# Patient Record
Sex: FEMALE | Race: WHITE | NOT HISPANIC OR LATINO | Employment: OTHER | URBAN - METROPOLITAN AREA
[De-identification: names, ages, dates, MRNs, and addresses within clinical notes are randomized per-mention and may not be internally consistent; named-entity substitution may affect disease eponyms.]

---

## 2017-02-03 ENCOUNTER — OFFICE VISIT - HEALTHEAST (OUTPATIENT)
Dept: INTERNAL MEDICINE | Facility: CLINIC | Age: 67
End: 2017-02-03

## 2017-02-03 DIAGNOSIS — I10 ESSENTIAL HYPERTENSION WITH GOAL BLOOD PRESSURE LESS THAN 140/90: ICD-10-CM

## 2017-02-03 DIAGNOSIS — G54.5 PARSONAGE-TURNER SYNDROME: ICD-10-CM

## 2017-02-03 ASSESSMENT — MIFFLIN-ST. JEOR: SCORE: 1364.92

## 2017-07-05 ENCOUNTER — COMMUNICATION - HEALTHEAST (OUTPATIENT)
Dept: SCHEDULING | Facility: CLINIC | Age: 67
End: 2017-07-05

## 2017-07-05 DIAGNOSIS — I10 UNSPECIFIED ESSENTIAL HYPERTENSION: ICD-10-CM

## 2018-05-23 ENCOUNTER — COMMUNICATION - HEALTHEAST (OUTPATIENT)
Dept: SCHEDULING | Facility: CLINIC | Age: 68
End: 2018-05-23

## 2018-05-23 DIAGNOSIS — I10 ESSENTIAL HYPERTENSION: ICD-10-CM

## 2018-08-14 ENCOUNTER — RECORDS - HEALTHEAST (OUTPATIENT)
Dept: ADMINISTRATIVE | Facility: OTHER | Age: 68
End: 2018-08-14

## 2018-08-16 ENCOUNTER — COMMUNICATION - HEALTHEAST (OUTPATIENT)
Dept: SCHEDULING | Facility: CLINIC | Age: 68
End: 2018-08-16

## 2018-08-16 DIAGNOSIS — I10 ESSENTIAL HYPERTENSION: ICD-10-CM

## 2018-11-05 ENCOUNTER — COMMUNICATION - HEALTHEAST (OUTPATIENT)
Dept: INTERNAL MEDICINE | Facility: CLINIC | Age: 68
End: 2018-11-05

## 2019-03-20 ENCOUNTER — COMMUNICATION - HEALTHEAST (OUTPATIENT)
Dept: INTERNAL MEDICINE | Facility: CLINIC | Age: 69
End: 2019-03-20

## 2019-03-20 DIAGNOSIS — I10 ESSENTIAL HYPERTENSION: ICD-10-CM

## 2019-05-31 ENCOUNTER — OFFICE VISIT - HEALTHEAST (OUTPATIENT)
Dept: INTERNAL MEDICINE | Facility: CLINIC | Age: 69
End: 2019-05-31

## 2019-05-31 DIAGNOSIS — Z12.31 VISIT FOR SCREENING MAMMOGRAM: ICD-10-CM

## 2019-05-31 DIAGNOSIS — I10 ESSENTIAL HYPERTENSION: ICD-10-CM

## 2019-05-31 DIAGNOSIS — G43.709 CHRONIC MIGRAINE WITHOUT AURA WITHOUT STATUS MIGRAINOSUS, NOT INTRACTABLE: ICD-10-CM

## 2019-05-31 DIAGNOSIS — I35.9 AORTIC VALVE DISORDER: ICD-10-CM

## 2019-05-31 LAB
ALBUMIN SERPL-MCNC: 3.8 G/DL (ref 3.5–5)
ALP SERPL-CCNC: 60 U/L (ref 45–120)
ALT SERPL W P-5'-P-CCNC: 20 U/L (ref 0–45)
ANION GAP SERPL CALCULATED.3IONS-SCNC: 11 MMOL/L (ref 5–18)
AST SERPL W P-5'-P-CCNC: 18 U/L (ref 0–40)
BILIRUB SERPL-MCNC: 0.7 MG/DL (ref 0–1)
BUN SERPL-MCNC: 15 MG/DL (ref 8–22)
CALCIUM SERPL-MCNC: 9.6 MG/DL (ref 8.5–10.5)
CHLORIDE BLD-SCNC: 107 MMOL/L (ref 98–107)
CHOLEST SERPL-MCNC: 209 MG/DL
CO2 SERPL-SCNC: 25 MMOL/L (ref 22–31)
CREAT SERPL-MCNC: 0.79 MG/DL (ref 0.6–1.1)
ERYTHROCYTE [DISTWIDTH] IN BLOOD BY AUTOMATED COUNT: 13 % (ref 11–14.5)
FASTING STATUS PATIENT QL REPORTED: YES
GFR SERPL CREATININE-BSD FRML MDRD: >60 ML/MIN/1.73M2
GLUCOSE BLD-MCNC: 93 MG/DL (ref 70–125)
HCT VFR BLD AUTO: 43.3 % (ref 35–47)
HDLC SERPL-MCNC: 66 MG/DL
HGB BLD-MCNC: 14.6 G/DL (ref 12–16)
LDLC SERPL CALC-MCNC: 127 MG/DL
MCH RBC QN AUTO: 28.1 PG (ref 27–34)
MCHC RBC AUTO-ENTMCNC: 33.6 G/DL (ref 32–36)
MCV RBC AUTO: 84 FL (ref 80–100)
PLATELET # BLD AUTO: 160 THOU/UL (ref 140–440)
PMV BLD AUTO: 9.1 FL (ref 7–10)
POTASSIUM BLD-SCNC: 3.5 MMOL/L (ref 3.5–5)
PROT SERPL-MCNC: 6.8 G/DL (ref 6–8)
RBC # BLD AUTO: 5.18 MILL/UL (ref 3.8–5.4)
SODIUM SERPL-SCNC: 143 MMOL/L (ref 136–145)
TRIGL SERPL-MCNC: 82 MG/DL
WBC: 6.7 THOU/UL (ref 4–11)

## 2019-05-31 ASSESSMENT — MIFFLIN-ST. JEOR: SCORE: 1387.6

## 2019-06-01 ENCOUNTER — COMMUNICATION - HEALTHEAST (OUTPATIENT)
Dept: INTERNAL MEDICINE | Facility: CLINIC | Age: 69
End: 2019-06-01

## 2019-06-01 DIAGNOSIS — I10 ESSENTIAL HYPERTENSION: ICD-10-CM

## 2019-06-04 ENCOUNTER — COMMUNICATION - HEALTHEAST (OUTPATIENT)
Dept: INTERNAL MEDICINE | Facility: CLINIC | Age: 69
End: 2019-06-04

## 2019-06-19 ENCOUNTER — COMMUNICATION - HEALTHEAST (OUTPATIENT)
Dept: INTERNAL MEDICINE | Facility: CLINIC | Age: 69
End: 2019-06-19

## 2019-06-19 ENCOUNTER — HOSPITAL ENCOUNTER (OUTPATIENT)
Dept: CARDIOLOGY | Facility: CLINIC | Age: 69
Discharge: HOME | End: 2019-06-19
Attending: INTERNAL MEDICINE

## 2019-06-19 DIAGNOSIS — I35.9 AORTIC VALVE DISORDER: ICD-10-CM

## 2019-06-19 LAB
AORTIC ROOT: 3.3 CM
AORTIC VALVE MEAN VELOCITY: 203 CM/S
AV DIMENSIONLESS INDEX VTI: 0.3
AV MEAN GRADIENT: 20 MMHG
AV PEAK GRADIENT: 40.7 MMHG
AV VALVE AREA: 1.4 CM2
AV VELOCITY RATIO: 0.4
BSA FOR ECHO PROCEDURE: 1.98 M2
CV ECHO HEIGHT: 63.3 IN
CV ECHO WEIGHT: 195 LBS
DOP CALC AO PEAK VEL: 319 CM/S
DOP CALC AO VTI: 63.2 CM
DOP CALC LVOT AREA: 4.15 CM2
DOP CALC LVOT DIAMETER: 2.3 CM
DOP CALC LVOT PEAK VEL: 117 CM/S
DOP CALC LVOT STROKE VOLUME: 86.8 CM3
DOP CALC MV VTI: 27.3 CM
DOP CALCLVOT PEAK VEL VTI: 20.9 CM
EJECTION FRACTION: 58 % (ref 55–75)
FRACTIONAL SHORTENING: 30.7 % (ref 28–44)
INTERVENTRICULAR SEPTUM IN END DIASTOLE: 1.7 CM (ref 0.6–0.9)
IVS/PW RATIO: 1.4
LA AREA 1: 14.6 CM2
LA AREA 2: 19.1 CM2
LEFT ATRIUM LENGTH: 3.81 CM
LEFT ATRIUM SIZE: 3.3 CM
LEFT ATRIUM TO AORTIC ROOT RATIO: 1 NO UNITS
LEFT ATRIUM VOLUME INDEX: 31.4 ML/M2
LEFT ATRIUM VOLUME: 62.2 ML
LEFT VENTRICLE CARDIAC INDEX: 2.9 L/MIN/M2
LEFT VENTRICLE CARDIAC OUTPUT: 5.7 L/MIN
LEFT VENTRICLE DIASTOLIC VOLUME INDEX: 42.4 CM3/M2 (ref 29–61)
LEFT VENTRICLE DIASTOLIC VOLUME: 84 CM3 (ref 46–106)
LEFT VENTRICLE HEART RATE: 66 BPM
LEFT VENTRICLE MASS INDEX: 129.9 G/M2
LEFT VENTRICLE SYSTOLIC VOLUME INDEX: 17.7 CM3/M2 (ref 8–24)
LEFT VENTRICLE SYSTOLIC VOLUME: 35 CM3 (ref 14–42)
LEFT VENTRICULAR INTERNAL DIMENSION IN DIASTOLE: 4.43 CM (ref 3.8–5.2)
LEFT VENTRICULAR INTERNAL DIMENSION IN SYSTOLE: 3.07 CM (ref 2.2–3.5)
LEFT VENTRICULAR MASS: 257.3 G
LEFT VENTRICULAR OUTFLOW TRACT MEAN GRADIENT: 3 MMHG
LEFT VENTRICULAR OUTFLOW TRACT MEAN VELOCITY: 75.1 CM/S
LEFT VENTRICULAR OUTFLOW TRACT PEAK GRADIENT: 5 MMHG
LEFT VENTRICULAR POSTERIOR WALL IN END DIASTOLE: 1.21 CM (ref 0.6–0.9)
LV STROKE VOLUME INDEX: 43.8 ML/M2
MITRAL VALVE DECELERATION SLOPE: 4520 MM/S2
MITRAL VALVE E/A RATIO: 0.6
MITRAL VALVE MEAN INFLOW VELOCITY: 77 CM/S
MITRAL VALVE PEAK VELOCITY: 132 CM/S
MITRAL VALVE PRESSURE HALF-TIME: 60 MS
MV AREA VTI: 3.18 CM2
MV AVERAGE E/E' RATIO: 9.1 CM/S
MV DECELERATION TIME: 211 MS
MV E'TISSUE VEL-LAT: 7.41 CM/S
MV E'TISSUE VEL-MED: 8.87 CM/S
MV LATERAL E/E' RATIO: 10
MV MEAN GRADIENT: 3 MMHG
MV MEDIAL E/E' RATIO: 8.3
MV PEAK A VELOCITY: 114 CM/S
MV PEAK E VELOCITY: 74 CM/S
MV PEAK GRADIENT: 7 MMHG
MV VALVE AREA BY CONTINUITY EQUATION: 3.2 CM2
MV VALVE AREA PRESSURE 1/2 METHOD: 3.7 CM2
NUC REST DIASTOLIC VOLUME INDEX: 3120 LBS
NUC REST SYSTOLIC VOLUME INDEX: 63.25 IN
TRICUSPID REGURGITATION PEAK PRESSURE GRADIENT: 14.6 MMHG
TRICUSPID VALVE ANULAR PLANE SYSTOLIC EXCURSION: 2.6 CM
TRICUSPID VALVE PEAK REGURGITANT VELOCITY: 191 CM/S

## 2019-06-19 ASSESSMENT — MIFFLIN-ST. JEOR: SCORE: 1387.6

## 2019-06-25 ENCOUNTER — COMMUNICATION - HEALTHEAST (OUTPATIENT)
Dept: INTERNAL MEDICINE | Facility: CLINIC | Age: 69
End: 2019-06-25

## 2020-03-23 ENCOUNTER — COMMUNICATION - HEALTHEAST (OUTPATIENT)
Dept: INTERNAL MEDICINE | Facility: CLINIC | Age: 70
End: 2020-03-23

## 2020-03-23 DIAGNOSIS — I10 ESSENTIAL HYPERTENSION: ICD-10-CM

## 2020-04-02 ENCOUNTER — COMMUNICATION - HEALTHEAST (OUTPATIENT)
Dept: INTERNAL MEDICINE | Facility: CLINIC | Age: 70
End: 2020-04-02

## 2020-04-03 ENCOUNTER — COMMUNICATION - HEALTHEAST (OUTPATIENT)
Dept: INTERNAL MEDICINE | Facility: CLINIC | Age: 70
End: 2020-04-03

## 2020-04-03 DIAGNOSIS — I10 ESSENTIAL HYPERTENSION: ICD-10-CM

## 2020-05-18 ENCOUNTER — OFFICE VISIT - HEALTHEAST (OUTPATIENT)
Dept: INTERNAL MEDICINE | Facility: CLINIC | Age: 70
End: 2020-05-18

## 2020-05-18 ENCOUNTER — COMMUNICATION - HEALTHEAST (OUTPATIENT)
Dept: SCHEDULING | Facility: CLINIC | Age: 70
End: 2020-05-18

## 2020-05-18 DIAGNOSIS — R22.1 SUBMANDIBULAR SWELLING: ICD-10-CM

## 2020-05-18 DIAGNOSIS — R22.0 SUBMANDIBULAR SWELLING: ICD-10-CM

## 2020-05-27 ENCOUNTER — OFFICE VISIT - HEALTHEAST (OUTPATIENT)
Dept: INTERNAL MEDICINE | Facility: CLINIC | Age: 70
End: 2020-05-27

## 2020-05-27 DIAGNOSIS — R22.1 SUBMANDIBULAR SWELLING: ICD-10-CM

## 2020-05-27 DIAGNOSIS — R22.0 SUBMANDIBULAR SWELLING: ICD-10-CM

## 2020-05-27 LAB
BASOPHILS # BLD AUTO: 0.1 THOU/UL (ref 0–0.2)
BASOPHILS NFR BLD AUTO: 1 % (ref 0–2)
EOSINOPHIL # BLD AUTO: 0.1 THOU/UL (ref 0–0.4)
EOSINOPHIL NFR BLD AUTO: 2 % (ref 0–6)
ERYTHROCYTE [DISTWIDTH] IN BLOOD BY AUTOMATED COUNT: 13.2 % (ref 11–14.5)
HCT VFR BLD AUTO: 45.3 % (ref 35–47)
HGB BLD-MCNC: 14.4 G/DL (ref 12–16)
LYMPHOCYTES # BLD AUTO: 2.3 THOU/UL (ref 0.8–4.4)
LYMPHOCYTES NFR BLD AUTO: 32 % (ref 20–40)
MCH RBC QN AUTO: 28.1 PG (ref 27–34)
MCHC RBC AUTO-ENTMCNC: 31.8 G/DL (ref 32–36)
MCV RBC AUTO: 89 FL (ref 80–100)
MONOCYTES # BLD AUTO: 0.5 THOU/UL (ref 0–0.9)
MONOCYTES NFR BLD AUTO: 7 % (ref 2–10)
NEUTROPHILS # BLD AUTO: 4.2 THOU/UL (ref 2–7.7)
NEUTROPHILS NFR BLD AUTO: 58 % (ref 50–70)
PLATELET # BLD AUTO: 175 THOU/UL (ref 140–440)
PMV BLD AUTO: 12.4 FL (ref 8.5–12.5)
RBC # BLD AUTO: 5.12 MILL/UL (ref 3.8–5.4)
TSH SERPL DL<=0.005 MIU/L-ACNC: 2.48 UIU/ML (ref 0.3–5)
WBC: 7.3 THOU/UL (ref 4–11)

## 2020-05-27 ASSESSMENT — MIFFLIN-ST. JEOR: SCORE: 1401.21

## 2020-05-29 ENCOUNTER — COMMUNICATION - HEALTHEAST (OUTPATIENT)
Dept: INTERNAL MEDICINE | Facility: CLINIC | Age: 70
End: 2020-05-29

## 2020-05-29 ENCOUNTER — COMMUNICATION - HEALTHEAST (OUTPATIENT)
Dept: SCHEDULING | Facility: CLINIC | Age: 70
End: 2020-05-29

## 2020-05-29 DIAGNOSIS — I10 ESSENTIAL HYPERTENSION: ICD-10-CM

## 2020-06-02 ENCOUNTER — RECORDS - HEALTHEAST (OUTPATIENT)
Dept: ADMINISTRATIVE | Facility: OTHER | Age: 70
End: 2020-06-02

## 2020-06-24 ENCOUNTER — OFFICE VISIT - HEALTHEAST (OUTPATIENT)
Dept: INTERNAL MEDICINE | Facility: CLINIC | Age: 70
End: 2020-06-24

## 2020-06-24 DIAGNOSIS — R22.1 SUBMANDIBULAR SWELLING: ICD-10-CM

## 2020-06-24 DIAGNOSIS — R22.0 SUBMANDIBULAR SWELLING: ICD-10-CM

## 2020-06-24 ASSESSMENT — MIFFLIN-ST. JEOR: SCORE: 1401.21

## 2020-06-29 ENCOUNTER — COMMUNICATION - HEALTHEAST (OUTPATIENT)
Dept: INTERNAL MEDICINE | Facility: CLINIC | Age: 70
End: 2020-06-29

## 2020-06-29 DIAGNOSIS — I10 ESSENTIAL HYPERTENSION: ICD-10-CM

## 2020-07-09 ENCOUNTER — OFFICE VISIT - HEALTHEAST (OUTPATIENT)
Dept: OTOLARYNGOLOGY | Facility: CLINIC | Age: 70
End: 2020-07-09

## 2020-07-09 ENCOUNTER — AMBULATORY - HEALTHEAST (OUTPATIENT)
Dept: OTOLARYNGOLOGY | Facility: CLINIC | Age: 70
End: 2020-07-09

## 2020-07-09 DIAGNOSIS — R60.0 SWELLING OF SUBMANDIBULAR GLAND: ICD-10-CM

## 2020-07-09 DIAGNOSIS — Z11.59 ENCOUNTER FOR SCREENING FOR OTHER VIRAL DISEASES: ICD-10-CM

## 2020-07-17 ENCOUNTER — AMBULATORY - HEALTHEAST (OUTPATIENT)
Dept: FAMILY MEDICINE | Facility: CLINIC | Age: 70
End: 2020-07-17

## 2020-07-17 DIAGNOSIS — Z11.59 ENCOUNTER FOR SCREENING FOR OTHER VIRAL DISEASES: ICD-10-CM

## 2020-07-20 ENCOUNTER — HOSPITAL ENCOUNTER (OUTPATIENT)
Dept: ULTRASOUND IMAGING | Facility: CLINIC | Age: 70
Discharge: HOME | End: 2020-07-20
Attending: OTOLARYNGOLOGY | Admitting: RADIOLOGY

## 2020-07-20 DIAGNOSIS — R60.0 SWELLING OF SUBMANDIBULAR GLAND: ICD-10-CM

## 2020-07-29 ENCOUNTER — AMBULATORY - HEALTHEAST (OUTPATIENT)
Dept: LAB | Facility: CLINIC | Age: 70
End: 2020-07-29

## 2020-07-29 ENCOUNTER — OFFICE VISIT - HEALTHEAST (OUTPATIENT)
Dept: INTERNAL MEDICINE | Facility: CLINIC | Age: 70
End: 2020-07-29

## 2020-07-29 ENCOUNTER — COMMUNICATION - HEALTHEAST (OUTPATIENT)
Dept: ADMINISTRATIVE | Facility: CLINIC | Age: 70
End: 2020-07-29

## 2020-07-29 DIAGNOSIS — Z00.00 ANNUAL PHYSICAL EXAM: ICD-10-CM

## 2020-07-29 DIAGNOSIS — I35.9 AORTIC VALVE DISORDER: ICD-10-CM

## 2020-07-29 DIAGNOSIS — Z01.84 IMMUNITY STATUS TESTING: ICD-10-CM

## 2020-07-29 DIAGNOSIS — Z12.11 SCREEN FOR COLON CANCER: ICD-10-CM

## 2020-07-29 DIAGNOSIS — R22.0 SUBMANDIBULAR SWELLING: ICD-10-CM

## 2020-07-29 DIAGNOSIS — Z78.0 POSTMENOPAUSAL STATUS: ICD-10-CM

## 2020-07-29 DIAGNOSIS — R22.1 SUBMANDIBULAR SWELLING: ICD-10-CM

## 2020-07-29 DIAGNOSIS — I10 ESSENTIAL HYPERTENSION: ICD-10-CM

## 2020-07-29 LAB
ALBUMIN SERPL-MCNC: 3.8 G/DL (ref 3.5–5)
ALBUMIN UR-MCNC: NEGATIVE MG/DL
ALP SERPL-CCNC: 72 U/L (ref 45–120)
ALT SERPL W P-5'-P-CCNC: 17 U/L (ref 0–45)
ANION GAP SERPL CALCULATED.3IONS-SCNC: 11 MMOL/L (ref 5–18)
APPEARANCE UR: CLEAR
AST SERPL W P-5'-P-CCNC: 17 U/L (ref 0–40)
BACTERIA #/AREA URNS HPF: ABNORMAL /[HPF]
BILIRUB SERPL-MCNC: 0.6 MG/DL (ref 0–1)
BILIRUB UR QL STRIP: ABNORMAL
BUN SERPL-MCNC: 20 MG/DL (ref 8–22)
CALCIUM SERPL-MCNC: 9.5 MG/DL (ref 8.5–10.5)
CHLORIDE BLD-SCNC: 106 MMOL/L (ref 98–107)
CHOLEST SERPL-MCNC: 225 MG/DL
CO2 SERPL-SCNC: 25 MMOL/L (ref 22–31)
COLOR UR AUTO: YELLOW
CREAT SERPL-MCNC: 0.74 MG/DL (ref 0.6–1.1)
FASTING STATUS PATIENT QL REPORTED: NO
GFR SERPL CREATININE-BSD FRML MDRD: >60 ML/MIN/1.73M2
GLUCOSE BLD-MCNC: 96 MG/DL (ref 70–125)
GLUCOSE UR STRIP-MCNC: NEGATIVE MG/DL
HDLC SERPL-MCNC: 64 MG/DL
HGB UR QL STRIP: NEGATIVE
KETONES UR STRIP-MCNC: ABNORMAL MG/DL
LDLC SERPL CALC-MCNC: 139 MG/DL
LEUKOCYTE ESTERASE UR QL STRIP: ABNORMAL
NITRATE UR QL: NEGATIVE
PH UR STRIP: 5.5 [PH] (ref 5–8)
POTASSIUM BLD-SCNC: 4 MMOL/L (ref 3.5–5)
PROT SERPL-MCNC: 6.7 G/DL (ref 6–8)
RBC #/AREA URNS AUTO: ABNORMAL /[HPF]
SODIUM SERPL-SCNC: 142 MMOL/L (ref 136–145)
SP GR UR STRIP: 1.02 (ref 1–1.03)
SQUAMOUS #/AREA URNS AUTO: ABNORMAL /[HPF]
TRIGL SERPL-MCNC: 109 MG/DL
UROBILINOGEN UR STRIP-ACNC: ABNORMAL
WBC #/AREA URNS AUTO: ABNORMAL /[HPF]

## 2020-07-29 ASSESSMENT — MIFFLIN-ST. JEOR: SCORE: 1379.67

## 2020-07-30 LAB — BACTERIA SPEC CULT: NO GROWTH

## 2020-07-31 ENCOUNTER — COMMUNICATION - HEALTHEAST (OUTPATIENT)
Dept: INTERNAL MEDICINE | Facility: CLINIC | Age: 70
End: 2020-07-31

## 2020-07-31 LAB
CAP COMMENT: NORMAL
LAB AP CHARGES (HE HISTORICAL CONVERSION): NORMAL
LAB AP INITIAL CYTO EVAL (HE HISTORICAL CONVERSION): NORMAL
LAB MED GENERAL PATH INTERP (HE HISTORICAL CONVERSION): NORMAL
PATH REPORT.COMMENTS IMP SPEC: NORMAL
PATH REPORT.COMMENTS IMP SPEC: NORMAL
PATH REPORT.FINAL DX SPEC: NORMAL
PATH REPORT.MICROSCOPIC SPEC OTHER STN: NORMAL
PATH REPORT.RELEVANT HX SPEC: NORMAL
SPECIMEN DESCRIPTION: NORMAL
VZV IGG SER QL IA: POSITIVE

## 2020-08-04 ENCOUNTER — COMMUNICATION - HEALTHEAST (OUTPATIENT)
Dept: INTERNAL MEDICINE | Facility: CLINIC | Age: 70
End: 2020-08-04

## 2020-08-05 ENCOUNTER — COMMUNICATION - HEALTHEAST (OUTPATIENT)
Dept: OTOLARYNGOLOGY | Facility: CLINIC | Age: 70
End: 2020-08-05

## 2020-08-10 ENCOUNTER — COMMUNICATION - HEALTHEAST (OUTPATIENT)
Dept: INTERNAL MEDICINE | Facility: CLINIC | Age: 70
End: 2020-08-10

## 2020-08-20 ENCOUNTER — HOSPITAL ENCOUNTER (OUTPATIENT)
Dept: CARDIOLOGY | Facility: CLINIC | Age: 70
Discharge: HOME | End: 2020-08-20

## 2020-08-20 DIAGNOSIS — I35.9 AORTIC VALVE DISORDER: ICD-10-CM

## 2020-08-20 LAB
AORTIC ROOT: 3.8 CM
ASCENDING AORTA: 4 CM
BSA FOR ECHO PROCEDURE: 1.98 M2
CV BLOOD PRESSURE: ABNORMAL MMHG
CV ECHO HEIGHT: 62.8 IN
CV ECHO WEIGHT: 195 LBS
DOP CALC LVOT AREA: 4.15 CM2
DOP CALC LVOT DIAMETER: 2.3 CM
DOP CALC MV VTI: 29.8 CM
EJECTION FRACTION: 65 % (ref 55–75)
FRACTIONAL SHORTENING: 49.4 % (ref 28–44)
INTERVENTRICULAR SEPTUM IN END DIASTOLE: 2.1 CM (ref 0.6–0.9)
IVS/PW RATIO: 1.3
LA AREA 1: 23.6 CM2
LA AREA 2: 26.4 CM2
LEFT ATRIUM LENGTH: 5.94 CM
LEFT ATRIUM SIZE: 4 CM
LEFT ATRIUM TO AORTIC ROOT RATIO: 1.05 NO UNITS
LEFT ATRIUM VOLUME INDEX: 45 ML/M2
LEFT ATRIUM VOLUME: 89.2 ML
LEFT VENTRICLE DIASTOLIC VOLUME INDEX: 70.2 CM3/M2 (ref 29–61)
LEFT VENTRICLE DIASTOLIC VOLUME: 139 CM3 (ref 46–106)
LEFT VENTRICLE HEART RATE: ABNORMAL BPM
LEFT VENTRICLE MASS INDEX: 172.8 G/M2
LEFT VENTRICLE SYSTOLIC VOLUME INDEX: 24.2 CM3/M2 (ref 8–24)
LEFT VENTRICLE SYSTOLIC VOLUME: 48 CM3 (ref 14–42)
LEFT VENTRICULAR INTERNAL DIMENSION IN DIASTOLE: 4.15 CM (ref 3.8–5.2)
LEFT VENTRICULAR INTERNAL DIMENSION IN SYSTOLE: 2.1 CM (ref 2.2–3.5)
LEFT VENTRICULAR MASS: 342.1 G
LEFT VENTRICULAR POSTERIOR WALL IN END DIASTOLE: 1.59 CM (ref 0.6–0.9)
MITRAL VALVE DECELERATION SLOPE: 5460 MM/S2
MITRAL VALVE E/A RATIO: 0.7
MITRAL VALVE MEAN INFLOW VELOCITY: 94.3 CM/S
MITRAL VALVE PEAK VELOCITY: 176 CM/S
MITRAL VALVE PRESSURE HALF-TIME: 63 MS
MV AVERAGE E/E' RATIO: 12.4 CM/S
MV DECELERATION TIME: 268 MS
MV E'TISSUE VEL-LAT: 10.1 CM/S
MV E'TISSUE VEL-MED: 6.24 CM/S
MV LATERAL E/E' RATIO: 10
MV MEAN GRADIENT: 4 MMHG
MV MEDIAL E/E' RATIO: 16.2
MV PEAK A VELOCITY: 146 CM/S
MV PEAK E VELOCITY: 101 CM/S
MV PEAK GRADIENT: 12.4 MMHG
MV VALVE AREA PRESSURE 1/2 METHOD: 3.5 CM2
NUC REST DIASTOLIC VOLUME INDEX: 3120 LBS
NUC REST SYSTOLIC VOLUME INDEX: 62.75 IN
TRICUSPID VALVE ANULAR PLANE SYSTOLIC EXCURSION: 2.2 CM

## 2020-08-20 ASSESSMENT — MIFFLIN-ST. JEOR: SCORE: 1379.67

## 2020-08-25 ENCOUNTER — AMBULATORY - HEALTHEAST (OUTPATIENT)
Dept: INTERNAL MEDICINE | Facility: CLINIC | Age: 70
End: 2020-08-25

## 2020-08-25 DIAGNOSIS — I10 ESSENTIAL HYPERTENSION: ICD-10-CM

## 2020-08-27 ENCOUNTER — COMMUNICATION - HEALTHEAST (OUTPATIENT)
Dept: INTERNAL MEDICINE | Facility: CLINIC | Age: 70
End: 2020-08-27

## 2020-09-09 ENCOUNTER — COMMUNICATION - HEALTHEAST (OUTPATIENT)
Dept: INTERNAL MEDICINE | Facility: CLINIC | Age: 70
End: 2020-09-09

## 2020-09-10 ENCOUNTER — COMMUNICATION - HEALTHEAST (OUTPATIENT)
Dept: CARDIOLOGY | Facility: CLINIC | Age: 70
End: 2020-09-10

## 2020-09-11 ENCOUNTER — OFFICE VISIT - HEALTHEAST (OUTPATIENT)
Dept: CARDIOLOGY | Facility: CLINIC | Age: 70
End: 2020-09-11

## 2020-09-11 DIAGNOSIS — I10 ESSENTIAL HYPERTENSION: ICD-10-CM

## 2020-09-11 DIAGNOSIS — I42.2 HYPERTROPHIC CARDIOMYOPATHY (H): ICD-10-CM

## 2020-09-11 ASSESSMENT — MIFFLIN-ST. JEOR: SCORE: 1384.21

## 2020-09-21 ENCOUNTER — HOSPITAL ENCOUNTER (OUTPATIENT)
Dept: MRI IMAGING | Facility: HOSPITAL | Age: 70
Discharge: HOME | End: 2020-09-21
Attending: INTERNAL MEDICINE

## 2020-09-21 DIAGNOSIS — I42.2 HYPERTROPHIC CARDIOMYOPATHY (H): ICD-10-CM

## 2020-09-22 LAB
CCTA EJECTION FRACTION: 97 %
CCTA INTERVENTRICULAR SETPUM: 1.8 CM (ref 0.6–1.1)
CCTA LEFT INTERNAL DIMENSION IN SYSTOLE: 2.1 CM (ref 2.1–4)
CCTA LEFT VENTRICULAR INTERNAL DIMENSION IN DIASTOLE: 4.9 CM (ref 3.5–6)
CCTA LEFT VENTRICULAR MASS: 239.86 G
CCTA POSTERIOR WALL: 0.7 CM (ref 0.6–1.1)
MR CARDIAC LEFT VENTRIAL CARDIAC INDEX: 4 L/MIN/M2 (ref 1.75–3.8)
MR CARDIAC LEFT VENTRICAL CARDIAC OUTPUT: 7.68 L/MIN (ref 2.8–8.8)
MR CARDIAC LEFT VENTRICULAR DIASTOLIC VOLUME INDEX: 89.97 ML/M2 (ref 41–81)
MR CARDIAC LEFT VENTRICULAR MASS INDEX: 104.09 G/M2 (ref 63–95)
MR CARDIAC LEFT VENTRICULAR MASS: 199 G (ref 75–175)
MR CARDIAC LEFT VENTRICULAR STROKE VOLUME INDEX: 62.24 ML/M2 (ref 26–56)
MR CARDIAC LEFT VENTRICULAR SYSTOLIC VOLUME INDEX: 27.72 ML/M2 (ref 12–20)
MR EJECTION FRACTION: 69.19 %
MR HEIGHT: 1.59 M
MR LEFT VENTRICULAR DYSTOLIC VOLUMEN: 172 ML (ref 52–141)
MR LEFT VENTRICULAR STROKE VOLUMEN: 119 ML (ref 33–97)
MR LEFT VENTRICULAR SYSTOLIC VOLUME: 53 ML (ref 13–51)
MR WEIGHT: 88.9 KG

## 2020-10-05 ENCOUNTER — COMMUNICATION - HEALTHEAST (OUTPATIENT)
Dept: INTERNAL MEDICINE | Facility: CLINIC | Age: 70
End: 2020-10-05

## 2020-10-05 DIAGNOSIS — I10 ESSENTIAL HYPERTENSION: ICD-10-CM

## 2020-10-05 LAB
CREAT BLD-MCNC: 1 MG/DL (ref 0.6–1.1)
GFR SERPL CREATININE-BSD FRML MDRD: 55 ML/MIN/1.73M2

## 2020-10-08 ENCOUNTER — AMBULATORY - HEALTHEAST (OUTPATIENT)
Dept: CARDIOLOGY | Facility: CLINIC | Age: 70
End: 2020-10-08

## 2020-10-08 DIAGNOSIS — I10 ESSENTIAL HYPERTENSION: ICD-10-CM

## 2020-10-28 ENCOUNTER — OFFICE VISIT - HEALTHEAST (OUTPATIENT)
Dept: CARDIOLOGY | Facility: CLINIC | Age: 70
End: 2020-10-28

## 2020-10-28 DIAGNOSIS — E66.811 CLASS 1 OBESITY DUE TO EXCESS CALORIES WITHOUT SERIOUS COMORBIDITY WITH BODY MASS INDEX (BMI) OF 34.0 TO 34.9 IN ADULT: ICD-10-CM

## 2020-10-28 DIAGNOSIS — I42.1 HOCM (HYPERTROPHIC OBSTRUCTIVE CARDIOMYOPATHY) (H): ICD-10-CM

## 2020-10-28 DIAGNOSIS — I10 ESSENTIAL HYPERTENSION: ICD-10-CM

## 2020-10-28 DIAGNOSIS — E66.09 CLASS 1 OBESITY DUE TO EXCESS CALORIES WITHOUT SERIOUS COMORBIDITY WITH BODY MASS INDEX (BMI) OF 34.0 TO 34.9 IN ADULT: ICD-10-CM

## 2020-10-28 ASSESSMENT — MIFFLIN-ST. JEOR: SCORE: 1370.6

## 2020-11-02 ENCOUNTER — TELEPHONE (OUTPATIENT)
Dept: CONSULT | Facility: CLINIC | Age: 70
End: 2020-11-02

## 2020-11-02 NOTE — TELEPHONE ENCOUNTER
Received referral from [facility] for patient to be seen in Genetics for HOCM (Hypertrophic Obstructive Cardiomyopathy).

## 2020-11-16 ENCOUNTER — HOSPITAL ENCOUNTER (OUTPATIENT)
Dept: CARDIOLOGY | Facility: CLINIC | Age: 70
Discharge: HOME | End: 2020-11-16
Attending: INTERNAL MEDICINE

## 2020-11-16 DIAGNOSIS — I42.1 HOCM (HYPERTROPHIC OBSTRUCTIVE CARDIOMYOPATHY) (H): ICD-10-CM

## 2020-11-16 LAB
CV STRESS CURRENT BP HE: NORMAL
CV STRESS CURRENT HR HE: 46
CV STRESS CURRENT HR HE: 46
CV STRESS CURRENT HR HE: 49
CV STRESS CURRENT HR HE: 51
CV STRESS CURRENT HR HE: 54
CV STRESS CURRENT HR HE: 54
CV STRESS CURRENT HR HE: 57
CV STRESS CURRENT HR HE: 58
CV STRESS CURRENT HR HE: 59
CV STRESS CURRENT HR HE: 60
CV STRESS CURRENT HR HE: 60
CV STRESS CURRENT HR HE: 61
CV STRESS CURRENT HR HE: 65
CV STRESS CURRENT HR HE: 72
CV STRESS CURRENT HR HE: 72
CV STRESS CURRENT HR HE: 75
CV STRESS CURRENT HR HE: 78
CV STRESS CURRENT HR HE: 79
CV STRESS CURRENT HR HE: 79
CV STRESS DEVIATION TIME HE: NORMAL
CV STRESS ECHO PERCENT HR HE: NORMAL
CV STRESS EXERCISE STAGE HE: NORMAL
CV STRESS EXERCISE STAGE REACHED HE: NORMAL
CV STRESS FINAL RESTING BP HE: NORMAL
CV STRESS FINAL RESTING HR HE: 60
CV STRESS MAX HR HE: 79
CV STRESS MAX TREADMILL GRADE HE: 12
CV STRESS MAX TREADMILL SPEED HE: 2.5
CV STRESS PEAK DIA BP HE: NORMAL
CV STRESS PEAK SYS BP HE: NORMAL
CV STRESS PHASE HE: NORMAL
CV STRESS PROTOCOL HE: NORMAL
CV STRESS RESTING PT POSITION HE: NORMAL
CV STRESS RESTING PT POSITION HE: NORMAL
CV STRESS ST DEVIATION AMOUNT HE: NORMAL
CV STRESS ST DEVIATION ELEVATION HE: NORMAL
CV STRESS ST EVELATION AMOUNT HE: NORMAL
CV STRESS TEST TYPE HE: NORMAL
CV STRESS TOTAL STAGE TIME MIN 1 HE: NORMAL
RATE PRESSURE PRODUCT: NORMAL
STRESS ECHO BASELINE DIASTOLIC HE: 64
STRESS ECHO BASELINE HR: 51
STRESS ECHO BASELINE SYSTOLIC BP: 124
STRESS ECHO CALCULATED PERCENT HR: 52 %
STRESS ECHO LAST STRESS DIASTOLIC BP: 90
STRESS ECHO LAST STRESS HR: 79
STRESS ECHO LAST STRESS SYSTOLIC BP: 168
STRESS ECHO POST ESTIMATED WORKLOAD: 6.2
STRESS ECHO POST EXERCISE DUR MIN: 4
STRESS ECHO POST EXERCISE DUR SEC: 20
STRESS ECHO TARGET HR: 79.04

## 2020-11-24 ENCOUNTER — VIRTUAL VISIT (OUTPATIENT)
Dept: CARDIOLOGY | Facility: CLINIC | Age: 70
End: 2020-11-24
Attending: GENETIC COUNSELOR, MS
Payer: MEDICARE

## 2020-11-24 DIAGNOSIS — I42.2 HYPERTROPHIC CARDIOMYOPATHY (H): Primary | ICD-10-CM

## 2020-11-24 PROCEDURE — 96040 HC GENETIC COUNSELING, EACH 30 MINUTES: CPT | Mod: GT | Performed by: GENETIC COUNSELOR, MS

## 2020-11-24 RX ORDER — METOPROLOL SUCCINATE 100 MG/1
100 TABLET, EXTENDED RELEASE ORAL
COMMUNITY
Start: 2020-10-28 | End: 2021-07-23

## 2020-11-24 RX ORDER — ECHINACEA PURPUREA EXTRACT 125 MG
1 TABLET ORAL DAILY PRN
COMMUNITY

## 2020-11-24 RX ORDER — BENZOCAINE/MENTHOL 6 MG-10 MG
LOZENGE MUCOUS MEMBRANE
COMMUNITY
End: 2021-07-23

## 2020-11-24 RX ORDER — OMEGA-3 FATTY ACIDS/FISH OIL 300-500 MG
CAPSULE ORAL
COMMUNITY

## 2020-11-24 RX ORDER — IRBESARTAN 300 MG/1
150 TABLET ORAL
COMMUNITY
Start: 2020-10-06 | End: 2021-07-23

## 2020-11-24 RX ORDER — CYCLOSPORINE 0.5 MG/ML
1 EMULSION OPHTHALMIC 2 TIMES DAILY
COMMUNITY

## 2020-11-24 NOTE — PROGRESS NOTES
"Romy Izquierdo is a 70 year old female who is being evaluated via a billable video visit.      The patient has been notified of following:     \"This video visit will be conducted via a call between you and your physician/provider. We have found that certain health care needs can be provided without the need for an in-person physical exam.  This service lets us provide the care you need with a video conversation.  If a prescription is necessary we can send it directly to your pharmacy.  If lab work is needed we can place an order for that and you can then stop by our lab to have the test done at a later time.  If during the course of the call the physician/provider feels a video visit is not appropriate, you will not be charged for this service.\"    Patient has given verbal consent for Video visit? Yes  How would you like to obtain your AVS? Mail a copy  If you are dropped from the video visit, the video invite should be resent to: Send to e-mail at: [email or web address]   Will anyone else be joining your video visit? No          "

## 2020-11-24 NOTE — LETTER
"11/24/2020      RE: Romy Izquierdo  [address] [place] [address] [id number]  [facility] [id number]       Dear Colleague,    Thank you for the opportunity to participate in the care of your patient, Romy Izquierdo, at the [facility]. Please see a copy of my visit note below.    Romy Izquierdo is a 70 year old female who is being evaluated via a billable video visit.      The patient has been notified of following:     \"This video visit will be conducted via a call between you and your physician/provider. We have found that certain health care needs can be provided without the need for an in-person physical exam.  This service lets us provide the care you need with a video conversation.  If a prescription is necessary we can send it directly to your pharmacy.  If lab work is needed we can place an order for that and you can then stop by our lab to have the test done at a later time.  If during the course of the call the physician/provider feels a video visit is not appropriate, you will not be charged for this service.\"    Patient has given verbal consent for Video visit? Yes  How would you like to obtain your AVS? Mail a copy  If you are dropped from the video visit, the video invite should be resent to: Send to e-mail at: [email or web address]   Will anyone else be joining your video visit? No            Here is a copy of the progress note from your recent genetic counseling visit through the [facility] on Date: 11/24/2020.  Due to Covid-19 pandemic, this appointment was moved to a virtual visit.    PROGRESS NOTE: Romy was referred by  for genetic counseling due to her  history of hypertrophic cardiomyopathy (HCM).  I had the opportunity to talk with Romy today to discuss the genetic component of HCM and testing options available to her .     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  In summer 2019, she started experiencing more shortness of breath, ECHO was performed and she reports that concerns about aortic valve issues were raised.      Repeat ECHO this August raised concern about hypertrophic cardiomyopathy.  An MRI was performed and showed maximum septal measurement of 1.8-20 cm, consistent with HCM.  Her ascending aorta was also reported at 4 cm.      FAMILY HISTORY: A detailed family history was obtained during today's consult.  Family history was significant for the following cardiac history: Romy's mother  at 89 years from a stroke.  She had a history of strokes in the years prior.  Maternal grandmother  in her 80's from leukemia and grandfather  in his 80's from suspected heart issues.      Romy's father  at 79 years.  He had a history of congestive heart failure and had a pacemaker.  He also had a history of colon cancer and a heart attack at 65 years.  She suspects that her father also had Parsonage Mcknight syndrome.  He had 8 siblings.  One brother had known heart issues and a pacemaker.  One sister  with COPD.  She had 9 children, one son drown at 18 years; two are diagnosed with HCM - One of them had surgery at [place] and she believes had genetic testing.  It is suspected that this condition came from their father but no more details are known.     Romy's paternal grandfather  in his 80's with colon cancer and grandmother also  in her 80's.      Romy has one daughter who has insulin resistance.  There is no additional history of cardiomyopathy, arrhythmias, heart attacks, fainting, sudden cardiac death, genetic conditions, or birth defects. (A copy of pedigree may be found under media tab).    DISCUSSION: Reviewed definition of hypertrophic cardiomyopathy (HCM). Explained that a good portion of HCM cases have a genetic  component.     Reviewed autosomal dominant (AD) inheritance pattern most commonly associated with HCM, including the 50% risk for recurrence. Briefly reviewed autosomal recessive and X-linked inheritance. Explained that HCM gene mutations are associated with reduced penetrance and variable expressivity, meaning that individuals who carry a gene mutation may or may not get the disease and onset and severity can vary from one family member to the next. This explains why you may not see cardiomyopathy in each generation of a family.     Currently over 25 genes have been found to be related to HCM. Genetic testing currently identifies mutations in about 50-60% of idiopathic cases. Reviewed capabilities, limitations, and logistics of testing.      Explained three possible outcomes of genetic testing including: positive identification of a mutation, no mutation identified, and identification of a variant of unknown significance (VUS). If a mutation is identified, presymptomatic testing would be available to at risk family members. If no mutation is identified, it does not rule out the possibility of a genetic component to this disease. Family members could still be at risk for developing the same condition. If a VUS is identified, it is unclear if the mutation is disease causing or just a normal variation. It may take time and possibly additional testing to determine the meaning of a VUS result.     Test results take approximately 2-4 weeks on average. Discussed cost of testing through commercial labs. Explained that the lab will work with insurance to determine coverage and contact patient if out of pocket costs are expected to exceed $100.     Discussed pros and cons of genetic testing. Explained that results could determine the cause for HCM. If a mutation is identified, presymptomatic testing is available to all at risk relatives. Reviewed possible issues associated with presymptomatic testing including genetic  discrimination, current laws to prevent discrimination (ie. ARYAN), insurance issues, and emotional and psychosocial outcomes of testing.     Recommend clinical evaluation for all first degree relatives (parents, siblings, and children) of an affected individual regardless of decision to pursue genetic testing. Based on the Heart Failure Society of [place] Practice Guidelines (Raymundo et al, 2018), clinical evaluation should be performed at least every 3 years, except yearly during puberty, and should include history, cardiac exam, ECHO, EKG, Holter monitoring, and exercise treadmill.    All questions answered at this time.     PLAN: Romy is interested in genetic testing but she would like to learn more about testing results in her cousins first.  Explained that since there is a question that this came from their father, unrelated to Romy, we would like still offer her testing for the full HCM panel.  However, learning their status would help confirm how their mutation is inherited in the family, especially if their father was never tested, and who else is at risk.    Romy will notify me if she gets a copy of their result or when she is ready to proceed with testing.      TOTAL TIME SPENT IN COUNSELIN minutes    Tawana Guerrero MS, [facility]  Licensed, Certified Genetic Counselor  [facility]       Please do not hesitate to contact me if you have any questions/concerns.     Sincerely,     Tawana Guerrero GC

## 2020-11-24 NOTE — PATIENT INSTRUCTIONS
"Indication for Genetic Counseling:     Hypertrophic cardiomyopathy (HCM) is a condition that causes part of the heart muscle (the left ventricle) to become thick and enlarged (hypertrophy).  It is usually diagnosed by echocardiogram (ECHO) or cardiac magnetic resonance imaging (MRI).  When the heart becomes enlarged, it cannot pump blood as effectively, which can lead to symptoms such as shortness of breath, fatigue, chest pains, irregular heartbeat, fainting, stroke, cardiac arrest, and sudden cardiac death (SCD).  HCM can be caused by a variety of factors, such as chronic hypertension, a narrow aorta, athletic heart, or genetics.  There are at least 20 known genes that, when not working properly, can cause HCM.    Inheritance:   Humans have over 20,000 genes that instruct our bodies how to function.  We have two copies of each gene because we inherit one from our mother and one from our father.  In most cardiac cases with a genetic component, the condition is inherited in an autosomal dominant (AD) pattern.  This means that in order to have the condition, a person needs to inherit a mutation on one copy of a particular gene.  This mutation or pathogenic variant dominates the \"normal\" working copy of the gene.  When an affected individual has children, they can either pass on the \"normal\" copy of the gene or the mutation.  Therefore, children have a 50% chance of inheriting the mutation.  Other family members also have an increased risk but the specific risk depends on the degree of relationship.  Additional inheritance patterns can occur within families and may alter the risk of recurrence.     Testing Options:   Genetic testing is available to assess a panel of genes known to cause this condition.  This test reads through the DNA (sequencing) of these genes to look for spelling mistakes or mutations that could cause the condition.      There are three types of results you could receive from this test.     " -Positive result (mutation identified) - confirms diagnosis and provides an answer to why this happened.  In addition, identifying a mutation allows family members to have testing to determine their risk.     -Negative result (mutation not identified) - no genetic changes were identified.  This does not rule out a genetic cause for the condition as the genetic testing only identifies 50-60% of genetic causes for this condition.    -Variant of uncertain significance (VUS) - a genetic change was identified, but there is not enough information to determine whether it is disease-causing or normal human genetic variation.     Although genetic testing may identify a mutation, it cannot provide information about the severity of symptoms or the progression of disease.  We cannot predict age of onset or severity of symptoms due to reduced penetrance and variable expressivity.    Logistics:   Genetic test involves test a sample of DNA, thru blood, saliva, or cheek cells.  The sample will be sent to a laboratory to extract the DNA and sequence the genes for mutations.  The laboratory will work with your insurance company to determine the out of pocket (OOP) cost and will notify you if the OOP cost is greater than $100.  When testing is initiated, results take about 2-4 weeks to return.     Genetic Information and Nondiscrimination Act:  The Genetic Information and Nondiscrimination Act of 2008 (ARYAN) is a federal law that protects individuals from genetic discrimination in health insurance and employment. Genetic discrimination is defined as the misuse of genetic information. This law does not address potential discrimination regarding life insurance or disability insurance.    This is especially relevant for at risk individuals who are considering presymptomatic testing.    Screening Recommendations:  Recommend clinical evaluation for all first degree relatives (parents, siblings, and children) of an affected individual  regardless of decision to pursue genetic testing.  Clinical evaluation should be performed at least every 3 years, except yearly during puberty, and should include history, cardiac exam, ECHO, EKG, Holter monitoring, and exercise treadmill.    Resources:  Hypertrophic Cardiomyopathy Association - 4hcm.org  Children's Cardiomyopathy Foundation - childrenscardiomyopathy.org  [place] Cardiomyopathy Association - cardiomyopathy.org  HCM Care phone sid    General   American Heart Association - americanheart.org  Genetics Home Reference - ghr.Paragon 28.nih.gov  Genetic Information and Nondiscrimination Act - [employer].org    Contact Information:  Tawana Guerrero MS  Licensed Genetic Counselor  [facility]  [facility] Care    Office:  [phone]  Appointments:  [phone]  Fax: [phone]  Email: [email or web address]

## 2020-11-24 NOTE — PROGRESS NOTES
Here is a copy of the progress note from your recent genetic counseling visit through the [facility] on Date: 2020.  Due to Covid-19 pandemic, this appointment was moved to a virtual visit.    PROGRESS NOTE: Romy was referred by  for genetic counseling due to her  history of hypertrophic cardiomyopathy (HCM).  I had the opportunity to talk with Romy today to discuss the genetic component of HCM and testing options available to her .     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  In summer 2019, she started experiencing more shortness of breath, ECHO was performed and she reports that concerns about aortic valve issues were raised.      Repeat ECHO this August raised concern about hypertrophic cardiomyopathy.  An MRI was performed and showed maximum septal measurement of 1.8-20 cm, consistent with HCM.  Her ascending aorta was also reported at 4 cm.      FAMILY HISTORY: A detailed family history was obtained during today's consult.  Family history was significant for the following cardiac history: Romy's mother  at 89 years from a stroke.  She had a history of strokes in the years prior.  Maternal grandmother  in her 80's from leukemia and grandfather  in his 80's from suspected heart issues.      Romy's father  at 79 years.  He had a history of congestive heart failure and had a pacemaker.  He also had a history of colon cancer and a heart attack at 65 years.  She suspects that her father also had Parsonage Mcknight syndrome.  He had 8 siblings.  One brother had known heart issues and a pacemaker.  One sister  with COPD.  She had 9 children, one son drown at 18 years; two are diagnosed with HCM - One of them had surgery at [place] and she believes had genetic testing.  It is suspected that this condition came from  their father but no more details are known.     Romy's paternal grandfather  in his 80's with colon cancer and grandmother also  in her 80's.      Romy has one daughter who has insulin resistance.  There is no additional history of cardiomyopathy, arrhythmias, heart attacks, fainting, sudden cardiac death, genetic conditions, or birth defects. (A copy of pedigree may be found under media tab).    DISCUSSION: Reviewed definition of hypertrophic cardiomyopathy (HCM). Explained that a good portion of HCM cases have a genetic component.     Reviewed autosomal dominant (AD) inheritance pattern most commonly associated with HCM, including the 50% risk for recurrence. Briefly reviewed autosomal recessive and X-linked inheritance. Explained that HCM gene mutations are associated with reduced penetrance and variable expressivity, meaning that individuals who carry a gene mutation may or may not get the disease and onset and severity can vary from one family member to the next. This explains why you may not see cardiomyopathy in each generation of a family.     Currently over 25 genes have been found to be related to HCM. Genetic testing currently identifies mutations in about 50-60% of idiopathic cases. Reviewed capabilities, limitations, and logistics of testing.      Explained three possible outcomes of genetic testing including: positive identification of a mutation, no mutation identified, and identification of a variant of unknown significance (VUS). If a mutation is identified, presymptomatic testing would be available to at risk family members. If no mutation is identified, it does not rule out the possibility of a genetic component to this disease. Family members could still be at risk for developing the same condition. If a VUS is identified, it is unclear if the mutation is disease causing or just a normal variation. It may take time and possibly additional testing to determine the meaning of a VUS  result.     Test results take approximately 2-4 weeks on average. Discussed cost of testing through commercial labs. Explained that the lab will work with insurance to determine coverage and contact patient if out of pocket costs are expected to exceed $100.     Discussed pros and cons of genetic testing. Explained that results could determine the cause for HCM. If a mutation is identified, presymptomatic testing is available to all at risk relatives. Reviewed possible issues associated with presymptomatic testing including genetic discrimination, current laws to prevent discrimination (ie. ARYAN), insurance issues, and emotional and psychosocial outcomes of testing.     Recommend clinical evaluation for all first degree relatives (parents, siblings, and children) of an affected individual regardless of decision to pursue genetic testing. Based on the Heart Failure Society of [place] Practice Guidelines (Raymundo et al, 2018), clinical evaluation should be performed at least every 3 years, except yearly during puberty, and should include history, cardiac exam, ECHO, EKG, Holter monitoring, and exercise treadmill.    All questions answered at this time.     PLAN: Romy is interested in genetic testing but she would like to learn more about testing results in her cousins first.  Explained that since there is a question that this came from their father, unrelated to Romy, we would like still offer her testing for the full HCM panel.  However, learning their status would help confirm how their mutation is inherited in the family, especially if their father was never tested, and who else is at risk.    Romy will notify me if she gets a copy of their result or when she is ready to proceed with testing.      TOTAL TIME SPENT IN COUNSELIN minutes    Tawana Guerrero MS, CGC  Licensed, Certified Genetic Counselor  [facility]

## 2020-11-30 ENCOUNTER — TELEPHONE (OUTPATIENT)
Dept: CARDIOLOGY | Facility: CLINIC | Age: 70
End: 2020-11-30

## 2020-11-30 NOTE — TELEPHONE ENCOUNTER
"Romy left a VM for mariia to call her back to discuss her cousin's HCM genetic testing results. She states that the results were from a lab in [place] \"[facility]\". Patient states that she has a copy of it.    12/2/2020 Spoke with patient. She reports that her cousin had genetic testing in 2019 for HCM panel at [facility]  No mutations were identified according to her reading of the report.  She will send a copy to me via mail or email.  Once I have a chance to review it we will talk again to determine best plan for her.      Explained that if he does not carry a gene mutation, the likelihood of finding one in her is reduced.  She may still be interested in that option so I will look at other labs to see number of genes included on HCM panels.    Mariia Guerrero MS, [facility]  Licensed, Certified Genetic Counselor  [facility]  [facility]       "

## 2020-12-11 ENCOUNTER — COMMUNICATION - HEALTHEAST (OUTPATIENT)
Dept: CARDIOLOGY | Facility: CLINIC | Age: 70
End: 2020-12-11

## 2020-12-11 DIAGNOSIS — I48.91 A-FIB (H): ICD-10-CM

## 2020-12-14 ENCOUNTER — COMMUNICATION - HEALTHEAST (OUTPATIENT)
Dept: CARDIOLOGY | Facility: CLINIC | Age: 70
End: 2020-12-14

## 2020-12-14 ENCOUNTER — OFFICE VISIT - HEALTHEAST (OUTPATIENT)
Dept: CARDIOLOGY | Facility: CLINIC | Age: 70
End: 2020-12-14

## 2020-12-14 DIAGNOSIS — I42.1 HOCM (HYPERTROPHIC OBSTRUCTIVE CARDIOMYOPATHY) (H): ICD-10-CM

## 2020-12-14 DIAGNOSIS — I48.0 PAF (PAROXYSMAL ATRIAL FIBRILLATION) (H): ICD-10-CM

## 2020-12-14 DIAGNOSIS — I10 ESSENTIAL HYPERTENSION: ICD-10-CM

## 2020-12-22 ENCOUNTER — AMBULATORY - HEALTHEAST (OUTPATIENT)
Dept: CARDIOLOGY | Facility: CLINIC | Age: 70
End: 2020-12-22

## 2020-12-22 DIAGNOSIS — I42.1 HOCM (HYPERTROPHIC OBSTRUCTIVE CARDIOMYOPATHY) (H): ICD-10-CM

## 2021-01-07 ENCOUNTER — COMMUNICATION - HEALTHEAST (OUTPATIENT)
Dept: ADMINISTRATIVE | Facility: CLINIC | Age: 71
End: 2021-01-07

## 2021-01-07 ENCOUNTER — HOSPITAL ENCOUNTER (OUTPATIENT)
Dept: CARDIOLOGY | Facility: CLINIC | Age: 71
Discharge: HOME | End: 2021-01-07
Attending: INTERNAL MEDICINE

## 2021-01-07 DIAGNOSIS — I42.1 HOCM (HYPERTROPHIC OBSTRUCTIVE CARDIOMYOPATHY) (H): ICD-10-CM

## 2021-01-07 DIAGNOSIS — I10 ESSENTIAL HYPERTENSION: ICD-10-CM

## 2021-01-07 LAB
AORTIC ROOT: 3.5 CM
AORTIC VALVE MEAN VELOCITY: 294 CM/S
ASCENDING AORTA: 3.5 CM
AV DIMENSIONLESS INDEX VTI: 0.8
AV MEAN GRADIENT: 43 MMHG
AV PEAK GRADIENT: 81 MMHG
AV VALVE AREA: 4.1 CM2
AV VELOCITY RATIO: 1
BSA FOR ECHO PROCEDURE: 1.95 M2
CV BLOOD PRESSURE: ABNORMAL MMHG
CV ECHO HEIGHT: 62 IN
CV ECHO WEIGHT: 193 LBS
DOP CALC AO PEAK VEL: 450 CM/S
DOP CALC AO VTI: 118 CM
DOP CALC LVOT AREA: 4.91 CM2
DOP CALC LVOT DIAMETER: 2.5 CM
DOP CALC LVOT PEAK VEL: 432 CM/S
DOP CALC LVOT STROKE VOLUME: 480.8 CM3
DOP CALC MV VTI: 35.5 CM
DOP CALCLVOT PEAK VEL VTI: 98 CM
ECHO EJECTION FRACTION ESTIMATED: 70 %
EJECTION FRACTION: 61 % (ref 55–75)
FRACTIONAL SHORTENING: 25 % (ref 28–44)
INTERVENTRICULAR SEPTUM IN END DIASTOLE: 1.7 CM (ref 0.6–0.9)
IVS/PW RATIO: 1.2
LA AREA 1: 22.8 CM2
LA AREA 2: 19.4 CM2
LEFT ATRIUM LENGTH: 5.05 CM
LEFT ATRIUM SIZE: 3.9 CM
LEFT ATRIUM TO AORTIC ROOT RATIO: 1.11 NO UNITS
LEFT ATRIUM VOLUME INDEX: 38.2 ML/M2
LEFT ATRIUM VOLUME: 74.4 ML
LEFT VENTRICLE CARDIAC INDEX: 15.5 L/MIN/M2
LEFT VENTRICLE CARDIAC OUTPUT: 30.3 L/MIN
LEFT VENTRICLE DIASTOLIC VOLUME INDEX: 58.5 CM3/M2 (ref 29–61)
LEFT VENTRICLE DIASTOLIC VOLUME: 114 CM3 (ref 46–106)
LEFT VENTRICLE HEART RATE: 63 BPM
LEFT VENTRICLE MASS INDEX: 163.5 G/M2
LEFT VENTRICLE SYSTOLIC VOLUME INDEX: 22.6 CM3/M2 (ref 8–24)
LEFT VENTRICLE SYSTOLIC VOLUME: 44 CM3 (ref 14–42)
LEFT VENTRICULAR INTERNAL DIMENSION IN DIASTOLE: 4.8 CM (ref 3.8–5.2)
LEFT VENTRICULAR INTERNAL DIMENSION IN SYSTOLE: 3.6 CM (ref 2.2–3.5)
LEFT VENTRICULAR MASS: 318.8 G
LEFT VENTRICULAR OUTFLOW TRACT MEAN GRADIENT: 31 MMHG
LEFT VENTRICULAR OUTFLOW TRACT MEAN VELOCITY: 254 CM/S
LEFT VENTRICULAR OUTFLOW TRACT PEAK GRADIENT: 75 MMHG
LEFT VENTRICULAR POSTERIOR WALL IN END DIASTOLE: 1.4 CM (ref 0.6–0.9)
LV STROKE VOLUME INDEX: 246.6 ML/M2
MITRAL REGURGITANT VELOCITY TIME INTEGRAL: 217 CM
MITRAL VALVE DECELERATION SLOPE: 2700 MM/S2
MITRAL VALVE E/A RATIO: 0.5
MITRAL VALVE MEAN INFLOW VELOCITY: 79.5 CM/S
MITRAL VALVE PEAK VELOCITY: 149 CM/S
MITRAL VALVE PRESSURE HALF-TIME: 94 MS
MR MEAN GRADIENT: 157 MMHG
MR MEAN VELOCITY: 593 CM/S
MR PEAK GRADIENT: 219.6 MMHG
MV AREA VTI: 13.54 CM2
MV AVERAGE E/E' RATIO: 14.1 CM/S
MV DECELERATION TIME: 180 MS
MV E'TISSUE VEL-LAT: 5.26 CM/S
MV E'TISSUE VEL-MED: 3.8 CM/S
MV LATERAL E/E' RATIO: 12.1
MV MEAN GRADIENT: 3 MMHG
MV MEDIAL E/E' RATIO: 16.8
MV PEAK A VELOCITY: 116 CM/S
MV PEAK E VELOCITY: 63.7 CM/S
MV PEAK GRADIENT: 8.9 MMHG
MV VALVE AREA BY CONTINUITY EQUATION: 13.5 CM2
MV VALVE AREA PRESSURE 1/2 METHOD: 2.3 CM2
NUC REST DIASTOLIC VOLUME INDEX: 3088 LBS
NUC REST SYSTOLIC VOLUME INDEX: 62 IN
PISA MR PEAK VEL: 741 CM/S
TRICUSPID REGURGITATION PEAK PRESSURE GRADIENT: 44.6 MMHG
TRICUSPID VALVE ANULAR PLANE SYSTOLIC EXCURSION: 2.6 CM
TRICUSPID VALVE PEAK REGURGITANT VELOCITY: 334 CM/S

## 2021-01-07 ASSESSMENT — MIFFLIN-ST. JEOR: SCORE: 1358.69

## 2021-01-14 ENCOUNTER — COMMUNICATION - HEALTHEAST (OUTPATIENT)
Dept: CARDIOLOGY | Facility: CLINIC | Age: 71
End: 2021-01-14

## 2021-01-15 ENCOUNTER — OFFICE VISIT - HEALTHEAST (OUTPATIENT)
Dept: CARDIOLOGY | Facility: CLINIC | Age: 71
End: 2021-01-15

## 2021-01-15 DIAGNOSIS — I48.0 PAROXYSMAL ATRIAL FIBRILLATION (H): ICD-10-CM

## 2021-01-15 DIAGNOSIS — E66.01 MORBID OBESITY (H): ICD-10-CM

## 2021-01-15 DIAGNOSIS — I42.2 HYPERTROPHIC CARDIOMYOPATHY (H): ICD-10-CM

## 2021-01-15 ASSESSMENT — MIFFLIN-ST. JEOR: SCORE: 1363.23

## 2021-01-19 ENCOUNTER — TELEPHONE (OUTPATIENT)
Dept: CARDIOLOGY | Facility: CLINIC | Age: 71
End: 2021-01-19

## 2021-01-19 DIAGNOSIS — I42.2 HYPERTROPHIC CARDIOMYOPATHY (H): Primary | ICD-10-CM

## 2021-01-19 NOTE — TELEPHONE ENCOUNTER
1/2021 Romy calling in today and requesting a callback from Tawana. She has found out a few more information in regards to her family history and has more questions for Tawana.    Pt states that her Great Aunt had a heart murmur and had a pacemaker implant but she is not sure if it is from cardiomyopathy. She is also unsure if her kids are aware of this.    Attempted to reach pt on several numbers with no luck.  Tried again today.  3/25/2021  Romy was initially seen for genetic counseling due to her history HCM and the significant history in her paternal family members.  Her father and at least two of his siblings had heart failure and pacemakers.  Two cousins are diagnosed with HCM and one had genetic testing.  Romy sent a copy of genetic test results from [place].  He was tested at [facility] in 2019 for the HCM panel (38 genes) and no variants were identified.    Reviewed those results today and explained that the likelihood for identifying a mutation with a larger panel  is likely quite small.  However it is an option if she would like to proceed.    Pt was interested in this option.  [facility] offers a 56 gene Cardiomyopathy Next panel and [facility] offers a panel for closer to 90 genes.    Reviewed logistics of testing, including billing information.  Lab will work with insurance to determine out of pocket cost.  If over $100, patient will be notified.  Patient also has option to do self pay for $250.    Romy had questions about Parsonage Mcknight syndrome, a neurological disorder that causes sudden and severe pain in your upper arm and shoulder. Explained that I do not know of any testing for this condition, but could be discussed with neurologist.    All questions answered at this time.    PLAN: Romy elected to proceed with [facility] panel.  Completed requisition and consent forms. Kit will be mailed to Romy for saliva collection.  Results will be available in about 2-4 weeks.  I will contact her when  they are back.      Tawana Guerrero MS, [facility]  Licensed, Certified Genetic Counselor  [facility]  [facility]

## 2021-01-20 ENCOUNTER — AMBULATORY - HEALTHEAST (OUTPATIENT)
Dept: CARDIOLOGY | Facility: CLINIC | Age: 71
End: 2021-01-20

## 2021-01-20 ENCOUNTER — SURGERY - HEALTHEAST (OUTPATIENT)
Dept: CARDIOLOGY | Facility: CLINIC | Age: 71
End: 2021-01-20

## 2021-01-20 ENCOUNTER — COMMUNICATION - HEALTHEAST (OUTPATIENT)
Dept: CARDIOLOGY | Facility: CLINIC | Age: 71
End: 2021-01-20

## 2021-01-20 DIAGNOSIS — I42.2 HYPERTROPHIC CARDIOMYOPATHY (H): ICD-10-CM

## 2021-01-20 DIAGNOSIS — I48.91 A-FIB (H): ICD-10-CM

## 2021-01-21 ENCOUNTER — COMMUNICATION - HEALTHEAST (OUTPATIENT)
Dept: CARDIOLOGY | Facility: CLINIC | Age: 71
End: 2021-01-21

## 2021-01-22 ENCOUNTER — OFFICE VISIT - HEALTHEAST (OUTPATIENT)
Dept: CARDIOLOGY | Facility: CLINIC | Age: 71
End: 2021-01-22

## 2021-01-22 DIAGNOSIS — I48.0 PAROXYSMAL ATRIAL FIBRILLATION (H): ICD-10-CM

## 2021-01-22 DIAGNOSIS — I42.2 HYPERTROPHIC CARDIOMYOPATHY (H): ICD-10-CM

## 2021-01-22 DIAGNOSIS — I10 ESSENTIAL HYPERTENSION: ICD-10-CM

## 2021-01-22 ASSESSMENT — MIFFLIN-ST. JEOR: SCORE: 1358.69

## 2021-01-25 ENCOUNTER — AMBULATORY - HEALTHEAST (OUTPATIENT)
Dept: CARDIOLOGY | Facility: HOSPITAL | Age: 71
End: 2021-01-25

## 2021-01-25 ENCOUNTER — COMMUNICATION - HEALTHEAST (OUTPATIENT)
Dept: CARDIOLOGY | Facility: CLINIC | Age: 71
End: 2021-01-25

## 2021-01-25 DIAGNOSIS — Z11.59 ENCOUNTER FOR SCREENING FOR OTHER VIRAL DISEASES: ICD-10-CM

## 2021-01-26 ENCOUNTER — AMBULATORY - HEALTHEAST (OUTPATIENT)
Dept: FAMILY MEDICINE | Facility: CLINIC | Age: 71
End: 2021-01-26

## 2021-01-26 ENCOUNTER — COMMUNICATION - HEALTHEAST (OUTPATIENT)
Dept: CARDIOLOGY | Facility: CLINIC | Age: 71
End: 2021-01-26

## 2021-01-26 DIAGNOSIS — I42.2 HYPERTROPHIC CARDIOMYOPATHY (H): ICD-10-CM

## 2021-01-26 DIAGNOSIS — I42.1 HOCM (HYPERTROPHIC OBSTRUCTIVE CARDIOMYOPATHY) (H): ICD-10-CM

## 2021-01-26 DIAGNOSIS — Z11.59 ENCOUNTER FOR SCREENING FOR OTHER VIRAL DISEASES: ICD-10-CM

## 2021-01-26 LAB
SARS-COV-2 PCR COMMENT: NORMAL
SARS-COV-2 RNA SPEC QL NAA+PROBE: NEGATIVE
SARS-COV-2 VIRUS SPECIMEN SOURCE: NORMAL

## 2021-01-27 ENCOUNTER — COMMUNICATION - HEALTHEAST (OUTPATIENT)
Dept: SCHEDULING | Facility: CLINIC | Age: 71
End: 2021-01-27

## 2021-01-29 ENCOUNTER — SURGERY - HEALTHEAST (OUTPATIENT)
Dept: CARDIOLOGY | Facility: HOSPITAL | Age: 71
End: 2021-01-29

## 2021-01-29 ASSESSMENT — MIFFLIN-ST. JEOR: SCORE: 1370.04

## 2021-02-05 ENCOUNTER — AMBULATORY - HEALTHEAST (OUTPATIENT)
Dept: CARDIOLOGY | Facility: CLINIC | Age: 71
End: 2021-02-05

## 2021-02-05 DIAGNOSIS — Z45.09 ENCOUNTER FOR LOOP RECORDER CHECK: ICD-10-CM

## 2021-02-05 DIAGNOSIS — I42.2 HYPERTROPHIC CARDIOMYOPATHY (H): ICD-10-CM

## 2021-02-05 LAB
HCC DEVICE COMMENTS: NORMAL
HCC DEVICE IMPLANTING PROVIDER: NORMAL
HCC DEVICE MANUFACTURE: NORMAL
HCC DEVICE MODEL: NORMAL
HCC DEVICE SERIAL NUMBER: NORMAL
HCC DEVICE TYPE: NORMAL

## 2021-02-05 ASSESSMENT — MIFFLIN-ST. JEOR: SCORE: 1385.91

## 2021-02-08 ENCOUNTER — COMMUNICATION - HEALTHEAST (OUTPATIENT)
Dept: CARDIOLOGY | Facility: CLINIC | Age: 71
End: 2021-02-08

## 2021-02-11 ENCOUNTER — COMMUNICATION - HEALTHEAST (OUTPATIENT)
Dept: CARDIOLOGY | Facility: CLINIC | Age: 71
End: 2021-02-11

## 2021-02-16 ENCOUNTER — COMMUNICATION - HEALTHEAST (OUTPATIENT)
Dept: INTERNAL MEDICINE | Facility: CLINIC | Age: 71
End: 2021-02-16

## 2021-02-17 ENCOUNTER — COMMUNICATION - HEALTHEAST (OUTPATIENT)
Dept: CARDIOLOGY | Facility: CLINIC | Age: 71
End: 2021-02-17

## 2021-03-11 ENCOUNTER — COMMUNICATION - HEALTHEAST (OUTPATIENT)
Dept: CARDIOLOGY | Facility: CLINIC | Age: 71
End: 2021-03-11

## 2021-03-12 ENCOUNTER — TELEPHONE (OUTPATIENT)
Dept: CARDIOLOGY | Facility: CLINIC | Age: 71
End: 2021-03-12

## 2021-03-12 ENCOUNTER — COMMUNICATION - HEALTHEAST (OUTPATIENT)
Dept: CARDIOLOGY | Facility: CLINIC | Age: 71
End: 2021-03-12

## 2021-03-12 DIAGNOSIS — I10 ESSENTIAL HYPERTENSION: ICD-10-CM

## 2021-03-12 NOTE — TELEPHONE ENCOUNTER
Called Kami back to inform her that this is a surgery that all of the surgeons at the [facility] can perform.  She asked about surgery volume and how soon an appointment would be available.   Kami explained patient is looking at going to the [facility] as they are so high volume.  Explained that the [facility] is also a high volume center with 8 cardiothoracic surgeons who preform surgery on a daily basis at  hospital in the [facility].  Kami will call and discuss this with the patient and they will make a decision where they want to go.  Patient's cardiologist is a [place] physician so he will put in a referral if patient decides to come here for surgery.

## 2021-03-12 NOTE — TELEPHONE ENCOUNTER
[facility] Call Center    Phone Message    May a detailed message be left on voicemail: yes     Reason for Call: Other: Kami from [employer] calling on behalf of pt because her local physician is recommending a septal myectomy procedure. Her diagnosis is hypertrophic obstructive cardiomyopathy. She is wondering if any [facility] the [facility]. Please call her back to discuss.      Action Taken: Message routed to:  [facility] ([facility] cardio    Travel Screening: Not Applicable

## 2021-03-16 ENCOUNTER — AMBULATORY - HEALTHEAST (OUTPATIENT)
Dept: CARDIOLOGY | Facility: CLINIC | Age: 71
End: 2021-03-16

## 2021-03-16 ENCOUNTER — COMMUNICATION - HEALTHEAST (OUTPATIENT)
Dept: CARDIOLOGY | Facility: CLINIC | Age: 71
End: 2021-03-16

## 2021-03-16 DIAGNOSIS — I42.2 HYPERTROPHIC CARDIOMYOPATHY (H): ICD-10-CM

## 2021-03-25 ENCOUNTER — MEDICAL CORRESPONDENCE (OUTPATIENT)
Dept: HEALTH INFORMATION MANAGEMENT | Facility: CLINIC | Age: 71
End: 2021-03-25

## 2021-03-26 ENCOUNTER — OFFICE VISIT - HEALTHEAST (OUTPATIENT)
Dept: INTERNAL MEDICINE | Facility: CLINIC | Age: 71
End: 2021-03-26

## 2021-03-26 DIAGNOSIS — I48.0 PAROXYSMAL ATRIAL FIBRILLATION (H): ICD-10-CM

## 2021-03-26 DIAGNOSIS — I42.2 HYPERTROPHIC CARDIOMYOPATHY (H): ICD-10-CM

## 2021-03-26 DIAGNOSIS — G43.709 CHRONIC MIGRAINE WITHOUT AURA WITHOUT STATUS MIGRAINOSUS, NOT INTRACTABLE: ICD-10-CM

## 2021-03-26 DIAGNOSIS — I35.9 AORTIC VALVE DISORDER: ICD-10-CM

## 2021-03-26 DIAGNOSIS — I10 ESSENTIAL HYPERTENSION: ICD-10-CM

## 2021-03-26 ASSESSMENT — MIFFLIN-ST. JEOR: SCORE: 1370.04

## 2021-04-12 DIAGNOSIS — I42.2 HYPERTROPHIC CARDIOMYOPATHY (H): ICD-10-CM

## 2021-04-18 ENCOUNTER — COMMUNICATION - HEALTHEAST (OUTPATIENT)
Dept: CARDIOLOGY | Facility: CLINIC | Age: 71
End: 2021-04-18

## 2021-04-18 DIAGNOSIS — I10 ESSENTIAL HYPERTENSION: ICD-10-CM

## 2021-05-13 ENCOUNTER — COMMUNICATION - HEALTHEAST (OUTPATIENT)
Dept: CARDIOLOGY | Facility: CLINIC | Age: 71
End: 2021-05-13

## 2021-05-14 ENCOUNTER — AMBULATORY - HEALTHEAST (OUTPATIENT)
Dept: CARDIOLOGY | Facility: CLINIC | Age: 71
End: 2021-05-14

## 2021-05-14 DIAGNOSIS — I48.0 PAROXYSMAL ATRIAL FIBRILLATION (H): ICD-10-CM

## 2021-05-21 LAB
LAB SCANNED RESULT: NORMAL
MISCELLANEOUS TEST: NORMAL

## 2021-05-26 ENCOUNTER — TELEPHONE (OUTPATIENT)
Dept: CARDIOLOGY | Facility: CLINIC | Age: 71
End: 2021-05-26

## 2021-05-26 ENCOUNTER — RECORDS - HEALTHEAST (OUTPATIENT)
Dept: ADMINISTRATIVE | Facility: OTHER | Age: 71
End: 2021-05-26

## 2021-05-26 NOTE — TELEPHONE ENCOUNTER
RN cannot approve Refill Request    RN can NOT refill this medication PCP messaged that patient is overdue for Labs and Office Visit.       Simran Manuel, [facility] Triage/Med Refill 3/22/2019    Requested Prescriptions   Pending Prescriptions Disp Refills     irbesartan (AVAPRO) 300 MG tablet 45 tablet 2     Sig: Take 0.5 tablets (150 mg total) by mouth daily.    Angiotensin Receptor Blocker Protocol Failed - 3/20/2019 10:23 AM       Failed - PCP or prescribing provider visit in past 12 months      Last office visit with prescriber/PCP: 2/3/2017 Kurt Wasserman MD OR same dept: Visit date not found OR same specialty: 2/3/2017 Kurt Wasserman MD  Last physical: Visit date not found Last MTM visit: Visit date not found   Next visit within 3 mo: Visit date not found  Next physical within 3 mo: Visit date not found  Prescriber OR PCP: Kurt Wasserman MD  Last diagnosis associated with med order: 1. Essential hypertension  - irbesartan (AVAPRO) 300 MG tablet; Take 0.5 tablets (150 mg total) by mouth daily.  Dispense: 45 tablet; Refill: 2    If protocol passes may refill for 12 months if within 3 months of last provider visit (or a total of 15 months).            Failed - Serum potassium within the past 12 months    No results found for: LN-POTASSIUM         Failed - Blood pressure filed in past 12 months    BP Readings from Last 1 Encounters:   02/03/17 126/80            Failed - Serum creatinine within the past 12 months    Creatinine   Date Value Ref Range Status   03/07/2012 0.8 0.6 - 1.3 mg/dL Final

## 2021-05-29 NOTE — TELEPHONE ENCOUNTER
Refill Approved    Rx renewed per Medication Renewal Policy. Medication was last renewed on 8/17/18.    Simran Manuel, [facility] Triage/Med Refill 6/3/2019     Requested Prescriptions   Pending Prescriptions Disp Refills     amLODIPine (NORVASC) 5 MG tablet 90 tablet 2     Sig: Take 1 tablet (5 mg total) by mouth daily.       Calcium-Channel Blockers Protocol Passed - 6/1/2019  9:36 AM        Passed - PCP or prescribing provider visit in past 12 months or next 3 months     Last office visit with prescriber/PCP: 5/31/2019 Kurt Wasserman MD OR same dept: 5/31/2019 Kurt Wasserman MD OR same specialty: 5/31/2019 Kurt Wasserman MD  Last physical: Visit date not found Last MTM visit: Visit date not found   Next visit within 3 mo: Visit date not found  Next physical within 3 mo: Visit date not found  Prescriber OR PCP: Kurt Wasserman MD  Last diagnosis associated with med order: 1. Essential hypertension  - amLODIPine (NORVASC) 5 MG tablet; Take 1 tablet (5 mg total) by mouth daily.  Dispense: 90 tablet; Refill: 2    If protocol passes may refill for 12 months if within 3 months of last provider visit (or a total of 15 months).             Passed - Blood pressure filed in past 12 months     BP Readings from Last 1 Encounters:   05/31/19 110/68

## 2021-05-29 NOTE — PROGRESS NOTES
"  Office Visit - Follow Up   Romy Perez   68 y.o. female    Date of Visit: 5/31/2019    Chief Complaint   Patient presents with     Follow-up     Medication check - not fasting        Assessment and Plan   1. Visit for screening mammogram  Get this done in July of every year.  She will get this scheduled    2. Essential hypertension  Pressure excellent today at 110/68.  - HM2(CBC w/o Differential)  - Comprehensive Metabolic Panel  - Lipid Cascade    3. Aortic Stenosis  He has a definite murmur of aortic stenosis.  We did an echocardiogram in 2013 for a murmur.  No aortic stenosis was seen in she had an ascending aorta of 4 cm thus we should get a repeat echocardiogram  - Echo Complete; Future    4. Chronic migraine without aura without status migrainosus, not intractable  She rarely uses a dose of Imitrex.      The following high BMI interventions were performed this visit: encouragement to exercise and weight monitoring    No follow-ups on file.     History of Present Illness   This 68 y.o. old I last saw her in 2/2017.  She gets all her medications filled here.  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  's been retired for many years.  He has 1 daughter Flaca who is getting her masters degree in elementary Ed at the [employer] and and will teach the early grades.  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  He had no further treatment of this for many years.    She is walking half a mile to 3-4 orders of an hour about 5 days/week.  When she goes uphill she feels short of breath.  Review of Systems: A comprehensive review of systems was negative except as noted.     Medications, Allergies and Problem List   Reviewed, reconciled and updated     Physical Exam   General Appearance:       /68 (Patient Site: Right Arm, Patient Position: Sitting, Cuff Size: Adult Regular)   Pulse 72   Ht 5' 3.25\" " (1.607 m)   Wt 195 lb (88.5 kg)   SpO2 95%   BMI 34.27 kg/m      She seems to have a 2/6 murmur of aortic stenosis..  Blood pressures under excellent control.  Lungs sound entirely clear.  The abdomen is soft and benign.  Her weight is up 5 pounds.  No edema in her legs.     Additional Information   Current Outpatient Medications   Medication Sig Dispense Refill     amLODIPine (NORVASC) 5 MG tablet TAKE ONE TABLET BY MOUTH ONE TIME DAILY  90 tablet 2     calcium-vitamin D (CALCIUM-VITAMIN D) 500 mg(1,250mg) -200 unit per tablet Take 2 tablets by mouth daily.       cycloSPORINE (RESTASIS) 0.05 % ophthalmic emulsion Administer 1 drop to both eyes 2 (two) times a day.       flaxseed oil 1,000 mg cap Take 1.5 capsules by mouth daily.       irbesartan (AVAPRO) 300 MG tablet Take 0.5 tablets (150 mg total) by mouth daily. 45 tablet 3     multivitamin therapeutic (THERAGRAN) tablet Take 1 tablet by mouth daily.       SUMAtriptan (IMITREX) 50 MG tablet Take 1 tablet (50 mg total) by mouth every 2 (two) hours as needed for migraine. 10 tablet 3     No current facility-administered medications for this visit.      Allergies   Allergen Reactions     Shellfish Containing Products Shortness Of Breath     Acetaminophen      Social History     Tobacco Use     Smoking status: Never Smoker     Smokeless tobacco: Never Used   Substance Use Topics     Alcohol use: Not on file     Drug use: Not on file       Review and/or order of clinical lab tests:  Review and/or order of radiology tests:  Review and/or order of medicine tests:  Discussion of test results with performing physician:  Decision to obtain old records and/or obtain history from someone other than the patient:  Review and summarization of old records and/or obtaining history from someone other than the patient and.or discussion of case with another health care provider:  Independent visualization of image, tracing or specimen itself:    Time: total time spent with the  patient was 25 minutes of which >50% was spent in counseling and coordination of care     Kurt Wasserman MD

## 2021-05-30 VITALS — BODY MASS INDEX: 33.66 KG/M2 | WEIGHT: 190 LBS | HEIGHT: 63 IN

## 2021-05-30 NOTE — TELEPHONE ENCOUNTER
Test Results  Who is calling?:  Patient  Who ordered the test:  Kurt Wasserman MD   Type of test: Other: Echocardiogram  Date of test:  6/19/2019  Where was the test performed:  [facility]  What are your questions/concerns?:  Patient is requesting a call to go over the results from the echocardiogram she completed on 6/19/2019.  Okay to leave a detailed message?:  Yes  [phone]

## 2021-05-30 NOTE — TELEPHONE ENCOUNTER
She wanted to go over them- what would you like us to tell her? (I'll send her a copy if she wants)

## 2021-06-02 ENCOUNTER — RECORDS - HEALTHEAST (OUTPATIENT)
Dept: ADMINISTRATIVE | Facility: OTHER | Age: 71
End: 2021-06-02

## 2021-06-03 ENCOUNTER — RECORDS - HEALTHEAST (OUTPATIENT)
Dept: SCHEDULING | Facility: CLINIC | Age: 71
End: 2021-06-03

## 2021-06-03 VITALS — BODY MASS INDEX: 34.55 KG/M2 | WEIGHT: 195 LBS | HEIGHT: 63 IN

## 2021-06-03 VITALS — WEIGHT: 195 LBS | BODY MASS INDEX: 34.55 KG/M2 | HEIGHT: 63 IN

## 2021-06-04 VITALS
BODY MASS INDEX: 34.55 KG/M2 | WEIGHT: 195 LBS | OXYGEN SATURATION: 94 % | TEMPERATURE: 98.5 F | DIASTOLIC BLOOD PRESSURE: 74 MMHG | HEART RATE: 83 BPM | HEIGHT: 63 IN | SYSTOLIC BLOOD PRESSURE: 122 MMHG

## 2021-06-04 VITALS
OXYGEN SATURATION: 94 % | RESPIRATION RATE: 20 BRPM | DIASTOLIC BLOOD PRESSURE: 80 MMHG | SYSTOLIC BLOOD PRESSURE: 124 MMHG | WEIGHT: 198 LBS | HEIGHT: 63 IN | HEART RATE: 70 BPM | BODY MASS INDEX: 35.08 KG/M2 | TEMPERATURE: 98.2 F

## 2021-06-04 VITALS
BODY MASS INDEX: 34.73 KG/M2 | HEIGHT: 63 IN | HEART RATE: 72 BPM | SYSTOLIC BLOOD PRESSURE: 136 MMHG | RESPIRATION RATE: 16 BRPM | DIASTOLIC BLOOD PRESSURE: 80 MMHG | WEIGHT: 196 LBS

## 2021-06-04 VITALS
HEART RATE: 66 BPM | BODY MASS INDEX: 35.08 KG/M2 | TEMPERATURE: 98.5 F | SYSTOLIC BLOOD PRESSURE: 142 MMHG | HEIGHT: 63 IN | DIASTOLIC BLOOD PRESSURE: 76 MMHG | WEIGHT: 198 LBS | OXYGEN SATURATION: 93 % | RESPIRATION RATE: 20 BRPM

## 2021-06-04 VITALS — HEIGHT: 63 IN | WEIGHT: 195 LBS | BODY MASS INDEX: 34.55 KG/M2

## 2021-06-05 ENCOUNTER — RECORDS - HEALTHEAST (OUTPATIENT)
Dept: ADMINISTRATIVE | Facility: OTHER | Age: 71
End: 2021-06-05

## 2021-06-05 VITALS
BODY MASS INDEX: 34.46 KG/M2 | WEIGHT: 193 LBS | DIASTOLIC BLOOD PRESSURE: 74 MMHG | RESPIRATION RATE: 16 BRPM | OXYGEN SATURATION: 94 % | HEART RATE: 48 BPM | SYSTOLIC BLOOD PRESSURE: 126 MMHG

## 2021-06-05 VITALS — HEIGHT: 63 IN | WEIGHT: 189.8 LBS | BODY MASS INDEX: 33.63 KG/M2

## 2021-06-05 VITALS
SYSTOLIC BLOOD PRESSURE: 128 MMHG | HEART RATE: 54 BPM | DIASTOLIC BLOOD PRESSURE: 72 MMHG | RESPIRATION RATE: 10 BRPM | WEIGHT: 194 LBS | HEIGHT: 62 IN | BODY MASS INDEX: 35.7 KG/M2

## 2021-06-05 VITALS
DIASTOLIC BLOOD PRESSURE: 60 MMHG | BODY MASS INDEX: 34.02 KG/M2 | WEIGHT: 192 LBS | OXYGEN SATURATION: 95 % | TEMPERATURE: 98.4 F | HEART RATE: 50 BPM | SYSTOLIC BLOOD PRESSURE: 116 MMHG | HEIGHT: 63 IN

## 2021-06-05 VITALS
RESPIRATION RATE: 16 BRPM | SYSTOLIC BLOOD PRESSURE: 138 MMHG | HEIGHT: 63 IN | WEIGHT: 195.5 LBS | HEART RATE: 48 BPM | TEMPERATURE: 98 F | BODY MASS INDEX: 34.64 KG/M2 | DIASTOLIC BLOOD PRESSURE: 80 MMHG

## 2021-06-05 VITALS
WEIGHT: 193 LBS | DIASTOLIC BLOOD PRESSURE: 80 MMHG | HEART RATE: 52 BPM | BODY MASS INDEX: 35.51 KG/M2 | SYSTOLIC BLOOD PRESSURE: 124 MMHG | RESPIRATION RATE: 10 BRPM | HEIGHT: 62 IN

## 2021-06-05 VITALS
HEART RATE: 64 BPM | RESPIRATION RATE: 14 BRPM | BODY MASS INDEX: 34.2 KG/M2 | HEIGHT: 63 IN | DIASTOLIC BLOOD PRESSURE: 78 MMHG | WEIGHT: 193 LBS | SYSTOLIC BLOOD PRESSURE: 130 MMHG

## 2021-06-05 VITALS — BODY MASS INDEX: 35.51 KG/M2 | HEIGHT: 62 IN | WEIGHT: 193 LBS

## 2021-06-06 ENCOUNTER — RECORDS - HEALTHEAST (OUTPATIENT)
Dept: ADMINISTRATIVE | Facility: OTHER | Age: 71
End: 2021-06-06

## 2021-06-07 NOTE — TELEPHONE ENCOUNTER
Dr. Sams,  Patient has an establish care visit with you on 7/8/2020.  Okay for refill requested?  Please advise.  Thank you.  Dena CATES, SOHAN/CMT....................2:58 PM

## 2021-06-07 NOTE — TELEPHONE ENCOUNTER
Patient has an establish care visit with Dr. Sams on 5/5/2020.  Dena CATES CMA/CMT....................3:54 PM

## 2021-06-07 NOTE — TELEPHONE ENCOUNTER
Medication Question or Clarification  Who is calling: patient   What medication are you calling about (include dose and sig)?:   irbesartan (AVAPRO) 300 MG tablet  45 tablet  0  3/25/2020      Sig - Route: Take 0.5 tablets (150 mg total) by mouth daily. - Oral     Class: Print       Who prescribed the medication?: Kurt Wasserman MD (Inactive)  What is your question/concern?: Patient states Rx for irbesartan was not sent to [facility] . Rx was sent as print please send it electronically .  Requested Pharmacy: [id number]  Okay to leave a detailed message?: No

## 2021-06-07 NOTE — TELEPHONE ENCOUNTER
Medication Request  Medication name:  irbesartan (AVAPRO) 300 MG tablet  45 tablet  0  3/25/2020      Sig - Route: Take 0.5 tablets (150 mg total) by mouth daily. - Oral     Class: Print         Requested [facility]: [facility] #[id number]  Reason for request:   Patient is requesting from Provider that she has an Establish Care appointment with on 7/8/2020.  When did you use medication last?:    Currently taking  Patient offered appointment:  No  Okay to leave a detailed message: yes      Please send to Pharmacy today as patient has one half tablet left of above medication.  Thank you.

## 2021-06-07 NOTE — TELEPHONE ENCOUNTER
Left message to call back for: Romy  Information to relay to patient:  Please let patient know that Dr. Wasserman is retired-     Is she planning on continuing her care here? If so she will need an establish care visit. (I believe these can be scheduled for July and after)    Once she is set up we can check with a covering provider about the refill- as it has been almost a year since she's been seen.

## 2021-06-08 ENCOUNTER — COMMUNICATION - HEALTHEAST (OUTPATIENT)
Dept: INTERNAL MEDICINE | Facility: CLINIC | Age: 71
End: 2021-06-08

## 2021-06-08 NOTE — TELEPHONE ENCOUNTER
RN cannot approve Refill Request    RN can NOT refill this medication No established PCP. Last office visit: Visit date not found Last Physical: Visit date not found Last MTM visit: Visit date not found Last visit same specialty: Visit date not found.  Next visit within 3 mo: Visit date not found  Next physical within 3 mo: Visit date not found      Desire Garcia, [facility] Triage/Med Refill 5/30/2020    Requested Prescriptions   Pending Prescriptions Disp Refills     amLODIPine (NORVASC) 5 MG tablet 90 tablet 0     Sig: Take 1 tablet (5 mg total) by mouth daily.       Calcium-Channel Blockers Protocol Passed - 5/29/2020 12:03 PM        Passed - PCP or prescribing provider visit in past 12 months or next 3 months     Last office visit with prescriber/PCP: Visit date not found OR same dept: Visit date not found OR same specialty: Visit date not found  Last physical: Visit date not found Last MTM visit: Visit date not found   Next visit within 3 mo: Visit date not found  Next physical within 3 mo: Visit date not found  Prescriber OR PCP: No Primary Care Provider  Last diagnosis associated with med order: 1. Essential hypertension  - amLODIPine (NORVASC) 5 MG tablet; Take 1 tablet (5 mg total) by mouth daily.  Dispense: 90 tablet; Refill: 0    If protocol passes may refill for 12 months if within 3 months of last provider visit (or a total of 15 months).             Passed - Blood pressure filed in past 12 months     BP Readings from Last 1 Encounters:   05/27/20 142/76

## 2021-06-08 NOTE — PROGRESS NOTES
"Romy Perez is a 69 y.o. female who is being evaluated via a billable video visit.      The patient has been notified of following:     \"This video visit will be conducted via a call between you and your physician/provider. We have found that certain health care needs can be provided without the need for an in-person physical exam.  This service lets us provide the care you need with a video conversation.  If a prescription is necessary we can send it directly to your pharmacy.  If lab work is needed we can place an order for that and you can then stop by our lab to have the test done at a later time.    Video visits are billed at different rates depending on your insurance coverage. Please reach out to your insurance provider with any questions.    If during the course of the call the physician/provider feels a video visit is not appropriate, you will not be charged for this service.\"    Patient has given verbal consent to a Video visit? Yes    Patient would like to receive their AVS by AVS Preference: Mail a copy.    Patient would like the video invitation sent by: E-mail: [email or web address]    Will anyone else be joining your video visit? No        Video Start Time: 4:13 PM    Additional provider notes:   [facility] Video Note  Romy Perez   69 y.o. female    Date of Visit: 5/18/2020  Chief Complaint   Patient presents with     Mass     LT side X1-2 weeks     Assessment/Plan  1. Submandibular swelling  Limited based on video exam but likely secondary to enlarged lymph node, likely reactive in nature with low suspicion for infectious process.  ROS quite unremarkable.  Recently evaluated by endocrine on May 31, 2019 with documentation of no cervical adenopathy.  Family history pertinent for mother of endometrial cancer and father of colon cancer.  Does also have hx of benign cystadenomas of her ovaries with no cancerous pathology.  Recommended to " monitor closely for the next 1 to 2 weeks with focus for any new symptoms or swelling/axillary adenopathy, and to let us know if this develops.  So, she might benefit from checking TSH and CBC, and axillary and cervical neck exam.      Much or all of the text in this note was generated through the use of Dragon Dictate voice-to-text software. Errors in spelling or words which seem out of context are unintentional. Sound alike errors, in particular, may have escaped editing  Sudhir Parker MD    Return if symptoms worsen or fail to improve.    Subjective  This 69 y.o. old female patient. History pertinent for hypertension, aortic stenosis, chronic migraines.  Concerned that she has a swollen lymph node on the left side of her neck, that is not responding to hydrocortisone cream.  States that she had some bug/gnat bites on her anterior neck and lateral left side of her face. This happened a week ago.  States her allergist had Rx hydrocortisone cream for her to use for such bites.  All areas responded to the cream except the specific area that is non tender and she thinks this was present before the bites. Thinks this has happened before. Not red or warm.  No rash.  Is concerned for cancer/cyst.  Not growing in size.  No sore throat, ear pain. Endorses some drainage in her throat in the AM but no sore throat. Due for next pelvic and breast screening this Summer and last colo 3-4 years ago was fine.  Mammogram 2019 unremarkable and states pelvic/pap exam was similar. No unexplained weight loss, f/ns/c. No other visible swelling or lesion in the axilla or sides of the neck. No tooth infection and not overdue for a dental visit.  Never smoker.  No dysphagia or odynophagia.    ROS A comprehensive review of systems was performed and was otherwise negative    Medications, allergies, and problem list were reviewed and updated    Exam  General appearance: Pleasant, nontoxic-appearing, no acute distress, alert and  oriented x4  There were no vitals filed for this visit.  GENERAL: Healthy, alert and no distress  EYES: Eyes grossly normal to inspection. No discharge or erythema, or obvious scleral/conjunctival abnormalities.  NECK: Isolated nonerythematous submandibular lymph node on the left side  RESP: No audible wheeze, cough, or visible cyanosis.  No visible retractions or increased work of breathing.    NEURO: Cranial nerves grossly intact. Mentation and speech appropriate for age.      Additional Information   Current Outpatient Medications   Medication Sig Dispense Refill     amLODIPine (NORVASC) 5 MG tablet Take 1 tablet (5 mg total) by mouth daily. 90 tablet 0     calcium-vitamin D (CALCIUM-VITAMIN D) 500 mg(1,250mg) -200 unit per tablet Take 2 tablets by mouth daily.       cycloSPORINE (RESTASIS) 0.05 % ophthalmic emulsion Administer 1 drop to both eyes 2 (two) times a day.       irbesartan (AVAPRO) 300 MG tablet Take 0.5 tablets (150 mg total) by mouth daily. 45 tablet 0     omega 3-dha-epa-fish oil 100-160-1,000 mg cap        SUMAtriptan (IMITREX) 50 MG tablet Take 1 tablet (50 mg total) by mouth every 2 (two) hours as needed for migraine. 10 tablet 3     vit C-E-zinc cit-lutein-zeaxan 50 mg-15 unit- 4.5 mg-2.5 mg Chew Chew.       flaxseed oil 1,000 mg cap Take 1.5 capsules by mouth daily.       multivitamin therapeutic (THERAGRAN) tablet Take 1 tablet by mouth daily.       No current facility-administered medications for this visit.      Allergies   Allergen Reactions     Shellfish Containing Products Shortness Of Breath     Acetaminophen      Social History     Social History Narrative     Not on file     Social History     Tobacco Use     Smoking status: Never Smoker     Smokeless tobacco: Never Used   Substance Use Topics     Alcohol use: Not on file     Drug use: Not on file     No family history on file.  Past Surgical History:   Procedure Laterality Date     RI EXCIS TENDON SHEATH LESION, HAND/FINGER       Description: Hand Excision Of A Tendon Cyst;  Recorded: 03/07/2012;     MS EXCISE BREAST CYST      Description: Breast Surgery Lumpectomy;  Recorded: 03/07/2012;  Comments: adenoma     MS EXCISION NOSE POLYP(S),EXTENSIVE      Description: Extensive Excision Of Nasal Polyps;  Recorded: 03/07/2012;     MS KNEE SCOPE,DIAGNOSTIC      Description: Arthroscopy Knee Left;  Recorded: 03/07/2012;     MS REMOVAL OF OVARY(S)      Description: Oophorectomy;  Recorded: 06/26/2013;   Time: total time spent with the patient was 23 minutes of which >50% was spent in counseling and coordination of care     Video-Visit Details  Type of service:  Video Visit  Video End Time (time video stopped): 4:36 PM  Originating Location (pt. Location): Home  Distant Location (provider location):  [place] [facility]   Platform used for Video Visit: Nena Parker MD

## 2021-06-08 NOTE — TELEPHONE ENCOUNTER
Test Results  Who is calling?:  Patient  Who ordered the test:  Diana Coello CNP  Type of test: Lab and Imaging  Date of test:  5/27/20  Where was the test performed:  [facility]  What are your questions/concerns?:  Patient was informed of her results below.     In regards to the ultrasound results, patient would like to know if she supposed to make a follow up appointment with BREE Ortiz or make an ultrasound appointment as a follow up?  Okay to leave a detailed message?:  Yes  [phone]    ______________________  These notes were copied from the lab tab:    Notes recorded by Diana Coello CNP on 5/27/2020 at 2:52 PM CDT   Please call then mail     Romy     The ultrasound results indicate a slightly enlarged lymph node is nonspecific with recommendation for short-term follow-up.  I would like you to follow-up in 4 weeks, sooner if needed.       Diana Coello CNP       ______________  These notes were copied from the lab tab:      Notes recorded by Diana Coello CNP on 5/28/2020 at 1:23 PM CDT   Please call and mail     Romy     TSH is normal.

## 2021-06-08 NOTE — TELEPHONE ENCOUNTER
"RN Triage  Romy has been doing some more walking for exercise. She has been bit by gnats, was told by allergist to put on cortisone cream. Bites on her neck and jaw and under her chin. She also notices today another \"lump\" she does not think is bug bite related, on her left neck. She thinks it may be a lymph node.  Not painful to touch. About the size of her thumb nail.     I advised romy we could help set up a virtual visit with a provider, she understands this plan and knows to call back with worsening symptoms.     Zelda Hampton RN  [facility] Nurse Advisor      Reason for Disposition    Patient wants to be seen    Protocols used: LYMPH NODES - IRIORHB-W-YB    COVID 19 Nurse Triage Plan/Patient Instructions    Please be aware that novel coronavirus (COVID-19) may be circulating in the community. If you develop symptoms such as fever, cough, or SOB or if you have concerns about the presence of another infection including coronavirus (COVID-19), please contact your health care provider or visit [email or web address].     Disposition/Instructions    Patient to have scheduled Telephone Visit with a provider. Follow System Ambulatory Workflow for COVID 19.     The clinic staff will assist you to schedule an appointment to complete the Telephone Visit with a provider during normal clinic hours.       Call Back If: Your symptoms worsen before you are able to complete your Telephone Visit with a provider.        Thank you for limiting contact with others, wearing a simple mask to cover your cough, practice good hand hygiene habits and accessing our virtual services where possible to limit the spread of this virus.    For more information about COVID19 and options for caring for yourself at home, please visit the CDC website at https://www.cdc.gov/coronavirus/2019-ncov/about/steps-when-sick.html  For more options for care at [facility], please visit our website at " [email or web address]    For more information, please use the [place] Department of Health COVID-19 Website: https://www.health.[facility].[place].us/diseases/coronavirus/index.html  [place] Department of Health ([facility]) COVID-19 Hotlines (Interpreters available):      Health questions: Phone Number: [phone] or 1-852.826.1365 and Hours: 7 a.m. to 7 p.m.    Schools and  questions: Phone Number: [phone] or [phone] and Hours 7 a.m. to 7 p.m.

## 2021-06-08 NOTE — PROGRESS NOTES
Internal Medicine Office Visit  [facility]  Patient Name: Romy Perez  Patient Age: 69 y.o.  YOB: 1950  MRN: [id number]    Date of Visit: 2020  Reason for Office Visit:   Chief Complaint   Patient presents with     Follow-up     swollen lymphnode. No change.            Assessment / Plan / Medical Decision Makin. Submandibular swelling  - HM1(CBC and Differential)  - Thyroid Stimulating Hormone (TSH)  - HM1 (CBC with Diff)  - US Neck Limited; Future  - US Neck Limited    Patient will follow up post diagnostic testing.   Orders Placed This Encounter   Procedures     US Neck Limited     Thyroid Stimulating Hormone (TSH)     HM1 (CBC with Diff)   Followup: Return in about 1 week (around 6/3/2020). earlier if needed.    Health Maintenance Review  Health Maintenance   Topic Date Due     HEPATITIS C SCREENING  1950     TD 18+ HE  1970     ZOSTER VACCINES (1 of 2) 2002     MEDICARE ANNUAL WELLNESS VISIT  2017     PNEUMOCOCCAL IMMUNIZATION 65+ LOW/MEDIUM RISK (1 of 2 - PCV13) 2017     DXA SCAN  2018     FALL RISK ASSESSMENT  2020     INFLUENZA VACCINE RULE BASED (Season Ended) 2020     COLORECTAL CANCER SCREENING  2021     MAMMOGRAM  2021     ADVANCE CARE PLANNING  08/10/2021     LIPID  2024         I am having Romy Perez maintain her SUMAtriptan, calcium-vitamin D, cycloSPORINE, amLODIPine, irbesartan, vit C-E-zinc cit-lutein-zeaxan, omega 3-dha-epa-fish oil, turmeric root extract, NON FORMULARY, NON FORMULARY, NON FORMULARY, NON FORMULARY, sodium chloride, hydrocortisone, and NON FORMULARY.      HPI:  Romy Perez is a 69 y.o. year old who presents to the office today with chronic conditions including Migraine headaches, hypertension, aortic stenosis, Parsonage-Mcknight syndrome.  Patient completed a video visit on 2020 due to concern for swollen  lymph node on the left side of her neck that was not responding to hydrocortisone cream.  Patient indicated that she had had some bug and that bites on the anterior neck and lateral left side of her face and indicated that her allergist had a prescription for hydrocortisone cream for her to use first these bites.  She was stated that all other areas responded to the cream except that the specific area that is nontender and believes it may have been present before the bites.  Patient reported it was not warm or red believes it is not growing in size.  She denies any sore throat ear pain though Does report postnasal drainage in the morning.  Patient indicated no unintentional weight loss and no other visible swelling or lesions in the axillary or side of the neck.  She indicated no dental infections and is not overdue for dental exam and she is a non-smoker.  Physical exam was noted to have an isolated not erythematous submandibular lymph node on the left side of the neck is felt this was likely reactive in nature with low suspicion for infectious process.  Patient was advised to monitor over 1 to 2 weeks and would focus on any new symptoms or swelling in the axillary adenopathy and to contact primary if these symptoms developed. Patient reports no improvement and believe the swollen lymph node has been for greater then 4 weeks.           Review of Systems- pertinent positive in bold:  Constitutional: Fever, chills, night sweats, fainting, weight change, fatigue, dizziness, sleeping difficulties, loud snoring/pauses in breathing  Eyes: change in vision, blurred or double vision, redness/eye pain  Ears, nose, mouth, throat: change in hearing, ear pain, hoarseness, difficulty swallowing, sores in the mouth or throat  Respiratory: shortness of breath, cough, bloody sputum, wheezing  Cardiovascular: chest pain, palpitations   Gastrointestinal: abdominal pain, heartburn/indigestion, nausea/vomiting, change in appetite,  change in bowel habits, constipation or diarrhea, rectal bleeding/dark stools, difficulty swallowing  Urinary: painful urination, frequent urination, urinary urgency/incontinence, blood in urine/dark urine, nocturia (new or increasing), muscle cramps, swelling of hands, feet, ankles, leg pain/redness  Skin: change in moles/freckles, rash, nodules  Hematologic/lymphatic: swollen lymph glands, abnormal bruising/bleeding  Endocrine: excessive thirst/urination, cold or heat intolerance  Neurologic/emotional: worrisome memory change, numbness/tingling, anxiety, mood swings      Current Scheduled Meds:  Outpatient Encounter Medications as of 5/27/2020   Medication Sig Dispense Refill     amLODIPine (NORVASC) 5 MG tablet Take 1 tablet (5 mg total) by mouth daily. 90 tablet 0     cycloSPORINE (RESTASIS) 0.05 % ophthalmic emulsion Administer 1 drop to both eyes 2 (two) times a day.       hydrocortisone 1 % cream Apply topically 2 (two) times a day.       irbesartan (AVAPRO) 300 MG tablet Take 0.5 tablets (150 mg total) by mouth daily. 45 tablet 0     NON FORMULARY 1 capsule daily. Candibactrin       NON FORMULARY Take 1 capsule by mouth daily. Nutridyrr-stress essentials balance       NON FORMULARY Take 1 capsule by mouth daily. Alleerplex       NON FORMULARY Take 1 packet by mouth daily. imminocal Platinum       NON FORMULARY Take 1 capsule by mouth daily. Magnesium Lactate       omega 3-dha-epa-fish oil 100-160-1,000 mg cap        sodium chloride (OCEAN) 0.65 % nasal spray Apply 1 spray into each nostril as needed for congestion.       SUMAtriptan (IMITREX) 50 MG tablet Take 1 tablet (50 mg total) by mouth every 2 (two) hours as needed for migraine. 10 tablet 3     turmeric root extract 500 mg cap Take 1 tablet by mouth daily.       calcium-vitamin D (CALCIUM-VITAMIN D) 500 mg(1,250mg) -200 unit per tablet Take 2 tablets by mouth daily.       vit C-E-zinc cit-lutein-zeaxan 50 mg-15 unit- 4.5 mg-2.5 mg Chew Chew.       No  "facility-administered encounter medications on file as of 5/27/2020.      No past medical history on file.  Past Surgical History:   Procedure Laterality Date     ID EXCIS TENDON SHEATH LESION, HAND/FINGER      Description: Hand Excision Of A Tendon Cyst;  Recorded: 03/07/2012;     ID EXCISE BREAST CYST      Description: Breast Surgery Lumpectomy;  Recorded: 03/07/2012;  Comments: adenoma     ID EXCISION NOSE POLYP(S),EXTENSIVE      Description: Extensive Excision Of Nasal Polyps;  Recorded: 03/07/2012;     ID KNEE SCOPE,DIAGNOSTIC      Description: Arthroscopy Knee Left;  Recorded: 03/07/2012;     ID REMOVAL OF OVARY(S)      Description: Oophorectomy;  Recorded: 06/26/2013;     Social History     Tobacco Use     Smoking status: Never Smoker     Smokeless tobacco: Never Used   Substance Use Topics     Alcohol use: Not on file     Drug use: Not on file     No family history on file.  Social History     Social History Narrative     Not on file       Objective / Physical Examination:  Vitals:    05/27/20 0845   BP: 142/76   Pulse: 66   Resp: 20   Temp: 98.5  F (36.9  C)   SpO2: 93%   Weight: 198 lb (89.8 kg)   Height: 5' 3.25\" (1.607 m)     Wt Readings from Last 3 Encounters:   05/27/20 198 lb (89.8 kg)   06/19/19 195 lb (88.5 kg)   05/31/19 195 lb (88.5 kg)     Body mass index is 34.8 kg/m .     General Appearance: Alert and oriented, cooperative, affect appropriate, speech clear, in no apparent distress  Head: Normocephalic, atraumatic  Ears: Tympanic membrane clear with landmarks well visualized bilaterally  Eyes:   Conjunctivae clear and sclerae non-icteric  Nose: Septum midline, nares patent,  mucosa moist and without drainage  Throat: Lips and mucosa moist. Teeth in good repair, pharynx without erythema or exudate  Neck: Supple, trachea midline. Mild submandibular lymphadenopathy noted.   Lungs: Clear to auscultation bilaterally. No adventitious lung sounds noted. Normal inspiratory and expiratory " effort  Cardiovascular: Regular rate, normal S1, S2. No murmurs, rubs, or gallops  Integumentary: Warm and dry. Without suspicious looking lesions  Neuro: Alert and oriented, follows commands appropriately.     No results found.  No results found for this or any previous visit (from the past 240 hour(s)).        Diana Coello, CNP

## 2021-06-08 NOTE — PROGRESS NOTES
Office Visit - Follow Up   Romy Perez   66 y.o. female    Date of Visit: 2/3/2017    Chief Complaint   Patient presents with     Follow-up     6 month follow up - not fasting - had an episode of difficult breathing        Assessment and Plan   1. Parsonage-Mcknight syndrome  This is essentially asymptomatic at this time.  She presented with a bilateral plexopathy in her arms several years ago.  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  Nothing more will be done about this.    2. Essential hypertension with goal blood pressure less than 140/90  Blood pressure is quite good today at 126/80          No Follow-up on file. she will see me again in follow-up in about 6 months      History of Present Illness   This 66 y.o. old comes in for follow-up on her hypertension.  She reports that one week ago she had some shortness of breath that lasted about 10-15 minutes she thought it was in association with some bad weather changes and some poor air that was coming through the area.  She reports no chest pain with this.  She has not had any re-of breath.  It was not severe.  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  She walks less because of this.  She gets some shortness of breath if she really walks briskly.  He had a known heart murmur.  Her last echocardiogram was 8/2013 and this was normal with no valvular disease seen.  Her daughter in he is now 16 years of age and certain to drive a car.  She is an excellent student.  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     Medications, Allergies and Problem List   Reviewed and updated     Physical Exam   General Appearance:       Visit Vitals     /80 (Patient Site: Right Arm, Patient Position: Sitting)     Pulse 64  "    Ht 5' 3.25\" (1.607 m)     Wt 190 lb (86.2 kg)     SpO2 97%     BMI 33.39 kg/m2       Lungs are clear.  No wheezes rales or rhonchi are heard.  She does have a systolic heart murmur that is heard most prominently at the right sternal border no diastolic component.  I do not think we need another echocardiogram.  Blood pressure is excellent.  No edema in her legs.       Additional Information   Current Outpatient Prescriptions   Medication Sig Dispense Refill     amLODIPine (NORVASC) 5 MG tablet Take 1 tablet (5 mg total) by mouth daily. 90 tablet 3     calcium-vitamin D (CALCIUM-VITAMIN D) 500 mg(1,250mg) -200 unit per tablet Take 2 tablets by mouth daily.       flaxseed oil 1,000 mg cap Take 1.5 capsules by mouth daily.       irbesartan (AVAPRO) 300 MG tablet Take 1/2 tablet daily 45 tablet 3     multivitamin therapeutic (THERAGRAN) tablet Take 1 tablet by mouth daily.       SUMAtriptan (IMITREX) 50 MG tablet Take 1 tablet (50 mg total) by mouth every 2 (two) hours as needed for migraine. 10 tablet 3     No current facility-administered medications for this visit.      Allergies   Allergen Reactions     Acetaminophen      Social History   Substance Use Topics     Smoking status: Never Smoker     Smokeless tobacco: Not on file     Alcohol use Not on file       Review and/or order of clinical lab tests:  Review and/or order of radiology tests:  Review and/or order of medicine tests:  Discussion of test results with performing physician:  Decision to obtain old records and/or obtain history from someone other than the patient:  Review and summarization of old records and/or obtaining history from someone other than the patient and.or discussion of case with another health care provider:  Independent visualization of image, tracing or specimen itself:    Time: total time spent with the patient was 15 minutes of which >50% was spent in counseling and coordination of care     Kurt Wasserman MD    "

## 2021-06-08 NOTE — TELEPHONE ENCOUNTER
Patient scheduled for 6/24/20 at 9:25 AM.    Maria De Jesus SAMANO LPN .......... 1:06 PM  05/29/20  [facility]

## 2021-06-09 NOTE — TELEPHONE ENCOUNTER
RN cannot approve Refill Request    RN can NOT refill this medication Protocol failed and NO refill given.       Simran Manuel, [facility] Triage/Med Refill 6/30/2020    Requested Prescriptions   Pending Prescriptions Disp Refills     irbesartan (AVAPRO) 300 MG tablet [Pharmacy Med Name: IRBESARTAN 300MG TABLETS] 45 tablet 3     Sig: TAKE ONE-HALF TABLET BY MOUTH DAILY       Angiotensin Receptor Blocker Protocol Failed - 6/29/2020 10:07 AM        Failed - PCP or prescribing provider visit in past 12 months       Last office visit with prescriber/PCP: Visit date not found OR same dept: Visit date not found OR same specialty: 6/24/2020 Diana Coello, CNP  Last physical: Visit date not found Last MTM visit: Visit date not found   Next visit within 3 mo: Visit date not found  Next physical within 3 mo: Visit date not found  Prescriber OR PCP: Gabrielle Sams MD  Last diagnosis associated with med order: 1. Essential hypertension  - irbesartan (AVAPRO) 300 MG tablet [Pharmacy Med Name: IRBESARTAN 300MG TABLETS]; TAKE ONE-HALF TABLET BY MOUTH DAILY  Dispense: 45 tablet; Refill: 0    If protocol passes may refill for 12 months if within 3 months of last provider visit (or a total of 15 months).             Failed - Serum potassium within the past 12 months     No results found for: LN-POTASSIUM          Failed - Serum creatinine within the past 12 months     Creatinine   Date Value Ref Range Status   05/31/2019 0.79 0.60 - 1.10 mg/dL Final             Passed - Blood pressure filed in past 12 months     BP Readings from Last 1 Encounters:   06/24/20 124/80

## 2021-06-09 NOTE — PROGRESS NOTES
Internal Medicine Office Visit  [facility]  Patient Name: Romy Perez  Patient Age: 69 y.o.  YOB: 1950  MRN: [id number]    Date of Visit: 2020  Reason for Office Visit:   Chief Complaint   Patient presents with     Follow-up     Discuss lymph node. next steps.            Assessment / Plan / Medical Decision Makin. Submandibular swelling  Given US results and watchful waiting without improvement or change, recommend ENT consult.  - Ambulatory referral to ENT      Orders Placed This Encounter   Procedures     Ambulatory referral to ENT   Followup: Return in about 4 weeks (around 2020). earlier if needed.    Health Maintenance Review  Health Maintenance   Topic Date Due     HEPATITIS C SCREENING  1950     TD 18+ HE  1970     ZOSTER VACCINES (1 of 2) 2002     MEDICARE ANNUAL WELLNESS VISIT  2017     PNEUMOCOCCAL IMMUNIZATION 65+ LOW/MEDIUM RISK (1 of 2 - PCV13) 2017     DXA SCAN  2018     FALL RISK ASSESSMENT  2020     INFLUENZA VACCINE RULE BASED (Season Ended) 2020     COLORECTAL CANCER SCREENING  2021     MAMMOGRAM  2021     ADVANCE CARE PLANNING  08/10/2021     LIPID  2024         I am having Romy Perez maintain her SUMAtriptan, calcium-vitamin D, cycloSPORINE, irbesartan, vit C-E-zinc cit-lutein-zeaxan, omega 3-dha-epa-fish oil, turmeric root extract, NON FORMULARY, NON FORMULARY, NON FORMULARY, NON FORMULARY, sodium chloride, hydrocortisone, NON FORMULARY, and amLODIPine.      HPI:  Romy Perez is a 69 y.o. year old who presents to the office today with chronic conditions including Migraine headaches, hypertension, aortic stenosis, Parsonage-Mcknight syndrome.Patient presents to clinic today for follow-up of submandibular swelling.  Patient was last seen on 2020 due to a swollen lymph node on the left side of her neck that was not  responding to hydrocortisone cream.  She had recently had a bug bite to the anterior neck and lateral left side of her face and indicated she was having a allergic reaction her allergist prescribed hydrocortisone cream.  She states all other areas responded to that a cortisone cream except for the nontender submandibular region.  Patient denies any sore throat or ear pain but did report some postnasal drainage, indicated no unintentional weight loss no other swelling or lesions in the axillary or side of the neck, no dental infections she is not overdue for dental exam she is a non-smoker.  When seen in May this had been swollen for approximately 4 weeks.  Physical exam indicated an isolated nonerythematous submandibular lymph node on the left side of the neck.  A subsequent ultrasound of the neck was completed and a palpable lump corresponds a slight enlarged lymph node submandibular gland with a well demarcated hypoechoic area medially in the midportion.  Ultrasound appearance is nonspecific consider low-grade inflammation, infiltrative process such as sarcoidosis or less likely lymphoma and recommended short-term follow-up.  CBC was relatively unremarkable TSH was within normal limits. Patient reports she has refrained from touching the lymph node and reports not increase or decrease in size.            Review of Systems- pertinent positive in bold:  Constitutional: Fever, chills, night sweats, fainting, weight change, fatigue, dizziness, sleeping difficulties, loud snoring/pauses in breathing  Eyes: change in vision, blurred or double vision, redness/eye pain  Ears, nose, mouth, throat: change in hearing, ear pain, hoarseness, difficulty swallowing, sores in the mouth or throat  Respiratory: shortness of breath, cough, bloody sputum, wheezing  Cardiovascular: chest pain, palpitations   Gastrointestinal: abdominal pain, heartburn/indigestion, nausea/vomiting, change in appetite, change in bowel habits,  constipation or diarrhea, rectal bleeding/dark stools, difficulty swallowing  Urinary: painful urination, frequent urination, urinary urgency/incontinence, blood in urine/dark urine, nocturia (new or increasing), muscle cramps, swelling of hands, feet, ankles, leg pain/redness  Skin: change in moles/freckles, rash, nodules  Hematologic/lymphatic: swollen lymph glands, abnormal bruising/bleeding  Endocrine: excessive thirst/urination, cold or heat intolerance  Neurologic/emotional: worrisome memory change, numbness/tingling, anxiety, mood swings      Current Scheduled Meds:  Outpatient Encounter Medications as of 6/24/2020   Medication Sig Dispense Refill     amLODIPine (NORVASC) 5 MG tablet Take 1 tablet (5 mg total) by mouth daily. 90 tablet 0     calcium-vitamin D (CALCIUM-VITAMIN D) 500 mg(1,250mg) -200 unit per tablet Take 2 tablets by mouth daily.       cycloSPORINE (RESTASIS) 0.05 % ophthalmic emulsion Administer 1 drop to both eyes 2 (two) times a day.       hydrocortisone 1 % cream Apply topically 2 (two) times a day.       irbesartan (AVAPRO) 300 MG tablet Take 0.5 tablets (150 mg total) by mouth daily. 45 tablet 0     NON FORMULARY 1 capsule daily. Candibactrin       NON FORMULARY Take 1 capsule by mouth daily. Nutridyrr-stress essentials balance       NON FORMULARY Take 1 capsule by mouth daily. Alleerplex       NON FORMULARY Take 1 packet by mouth daily. imminocal Platinum       NON FORMULARY Take 1 capsule by mouth daily. Magnesium Lactate       omega 3-dha-epa-fish oil 100-160-1,000 mg cap        sodium chloride (OCEAN) 0.65 % nasal spray Apply 1 spray into each nostril as needed for congestion.       SUMAtriptan (IMITREX) 50 MG tablet Take 1 tablet (50 mg total) by mouth every 2 (two) hours as needed for migraine. 10 tablet 3     turmeric root extract 500 mg cap Take 1 tablet by mouth daily.       vit C-E-zinc cit-lutein-zeaxan 50 mg-15 unit- 4.5 mg-2.5 mg Chew Chew.       No facility-administered  "encounter medications on file as of 6/24/2020.      No past medical history on file.  Past Surgical History:   Procedure Laterality Date     IN EXCIS TENDON SHEATH LESION, HAND/FINGER      Description: Hand Excision Of A Tendon Cyst;  Recorded: 03/07/2012;     IN EXCISE BREAST CYST      Description: Breast Surgery Lumpectomy;  Recorded: 03/07/2012;  Comments: adenoma     IN EXCISION NOSE POLYP(S),EXTENSIVE      Description: Extensive Excision Of Nasal Polyps;  Recorded: 03/07/2012;     IN KNEE SCOPE,DIAGNOSTIC      Description: Arthroscopy Knee Left;  Recorded: 03/07/2012;     IN REMOVAL OF OVARY(S)      Description: Oophorectomy;  Recorded: 06/26/2013;     Social History     Tobacco Use     Smoking status: Never Smoker     Smokeless tobacco: Never Used   Substance Use Topics     Alcohol use: Not on file     Drug use: Not on file     No family history on file.  Social History     Social History Narrative     Not on file       Objective / Physical Examination:  Vitals:    06/24/20 0924   BP: 124/80   Pulse: 70   Resp: 20   Temp: 98.2  F (36.8  C)   SpO2: 94%   Weight: 198 lb (89.8 kg)   Height: 5' 3.25\" (1.607 m)     Wt Readings from Last 3 Encounters:   06/24/20 198 lb (89.8 kg)   05/27/20 198 lb (89.8 kg)   06/19/19 195 lb (88.5 kg)     Body mass index is 34.8 kg/m .     General Appearance: Alert and oriented, cooperative, affect appropriate, speech clear, in no apparent distress  Head: Normocephalic, atraumatic  Eyes:  Conjunctivae clear and sclerae non-icteric  Neck: Supple, trachea midline.mild submandibular lymphadenopathy noted.   Lungs:Normal inspiratory and expiratory effort  Integumentary: Warm and dry.   Neuro: Alert and oriented, follows commands appropriately.     Us Neck Limited    Result Date: 5/27/2020  EXAM DATE:         05/27/2020 EXAM: US SOFT TISSUE HEAD/NECK LOCATION: [facility] DATE/TIME: 5/27/2020 11:30 AM INDICATION: Left neck lump for the past six months. COMPARISON: None. " TECHNIQUE: Routine. FINDINGS: The left submandibular gland is likely larger and  different in appearance relative to the right. The left measures 3.6 x 3.3 x 1.4 cm in the right 3.3 x 2.9 x 1.2 cm. In addition, just superior to the midline of the gland there is a well demarcated geographic hypoechoic area medially that measures 2 x 1.5 x 0.8 cm. No calcifications within it. The rest of the gland is hyperechoic and normal. No lesions noted in the right submandibular gland. IMPRESSION: 1.  Palpable lump corresponds to a slightly enlarged left submandibular gland with a well demarcated hypoechoic area medially in the midportion. The ultrasound appearance is nonspecific. Considerations include low-grade inflammation, infiltrative process such as sarcoidosis or less likely lymphoma. Consider short-term follow-up. No visible calculi. 2.  The right submandibular gland is normal. NOTE: ABNORMAL REPORT THE DICTATION ABOVE DESCRIBES AN ABNORMALITY FOR WHICH FOLLOW-UP IS NEEDED.     No results found for this or any previous visit (from the past 240 hour(s)).        Diana Coello, CNP

## 2021-06-09 NOTE — PROGRESS NOTES
HPI: This patient is a 70yo F who presents to clinic for evaluation of a neck mass at the request of Diana Coello CNP. It has been present on the right side for about a year. It does not cause any pain, nor has she appreciated any growth. Denies fevers, unintentional weight loss, odynophagia, dysphagia, hemoptysis, voice changes, and shortness of breath.     Past medical history, surgical history, social history, family history, medications, and allergies have been reviewed with the patient and are documented above.    Review of Systems: a 10-system review was performed. Pertinent positives are noted in the HPI and on a separate scanned document in the chart.    PHYSICAL EXAMINATION:  GEN: no acute distress, normocephalic  EYES: extraocular movements are intact, pupils are equal and round. Sclera clear.   EARS: auricles are normally formed. The external auditory canals are clear with minimal to no cerumen. Tympanic membranes are intact bilaterally with no signs of infection, effusion, retractions, or perforations.  NOSE: anterior nares are patent.   OC/OP: clear, dentition is in good repair. The tongue and palate are fully mobile and symmetric. No masses or lesions.   NECK: soft and supple. No lymphadenopathy. Airway is midline. Left SMG feels enlarged compared to the right, but no specific mass/cyst/lesion is appreciated.  NEURO: CN VII and XII symmetric. alert and oriented. No spontaneous nystagmus. Gait is normal.  PULM: breathing comfortably on room air, normal chest expansion with respiration  CARDS: no cyanosis or clubbing, normal carotid pulses    NECK U/S:  IMPRESSION:  1.  Palpable lump corresponds to a slightly enlarged left submandibular gland with a well demarcated hypoechoic area medially in the midportion. The ultrasound appearance is nonspecific. Considerations include low-grade inflammation, infiltrative process such as sarcoidosis or less likely lymphoma. Consider short-term follow-up. No visible  "calculi.  2.  The right submandibular gland is normal.    MEDICAL DECISION-MAKING: This patient is a 68yo F with an enlarged left SMG with a \"well demarcated hypoechoic area\" in the midportion, which most likely represents a simple cyst by this description. Explained to the patient that we have a burden to prove this is a benign entity, so will send for an FNA. Will call her with the results.     "

## 2021-06-10 ENCOUNTER — OFFICE VISIT - HEALTHEAST (OUTPATIENT)
Dept: INTERNAL MEDICINE | Facility: CLINIC | Age: 71
End: 2021-06-10

## 2021-06-10 DIAGNOSIS — I42.2 HYPERTROPHIC CARDIOMYOPATHY (H): ICD-10-CM

## 2021-06-10 DIAGNOSIS — D72.829 LEUKOCYTOSIS, UNSPECIFIED TYPE: ICD-10-CM

## 2021-06-10 DIAGNOSIS — I48.0 PAF (PAROXYSMAL ATRIAL FIBRILLATION) (H): ICD-10-CM

## 2021-06-10 DIAGNOSIS — Z09 HOSPITAL DISCHARGE FOLLOW-UP: ICD-10-CM

## 2021-06-10 LAB
ANION GAP SERPL CALCULATED.3IONS-SCNC: 14 MMOL/L (ref 5–18)
BUN SERPL-MCNC: 36 MG/DL (ref 8–22)
CALCIUM SERPL-MCNC: 9.2 MG/DL (ref 8.5–10.5)
CHLORIDE BLD-SCNC: 105 MMOL/L (ref 98–107)
CO2 SERPL-SCNC: 28 MMOL/L (ref 22–31)
CREAT SERPL-MCNC: 1.23 MG/DL (ref 0.6–1.1)
ERYTHROCYTE [DISTWIDTH] IN BLOOD BY AUTOMATED COUNT: 15.4 % (ref 11–14.5)
GFR SERPL CREATININE-BSD FRML MDRD: 43 ML/MIN/1.73M2
GLUCOSE BLD-MCNC: 109 MG/DL (ref 70–125)
HCT VFR BLD AUTO: 31.1 % (ref 35–47)
HGB BLD-MCNC: 9.5 G/DL (ref 12–16)
MCH RBC QN AUTO: 28.2 PG (ref 27–34)
MCHC RBC AUTO-ENTMCNC: 30.5 G/DL (ref 32–36)
MCV RBC AUTO: 92 FL (ref 80–100)
PLATELET # BLD AUTO: 277 THOU/UL (ref 140–440)
PMV BLD AUTO: 10.6 FL (ref 7–10)
POTASSIUM BLD-SCNC: 3.8 MMOL/L (ref 3.5–5)
RBC # BLD AUTO: 3.37 MILL/UL (ref 3.8–5.4)
SODIUM SERPL-SCNC: 147 MMOL/L (ref 136–145)
WBC: 11.9 THOU/UL (ref 4–11)

## 2021-06-10 NOTE — PROGRESS NOTES
Assessment/Plan:     1. Annual physical exam  - Lipid Cascade RANDOM; Future  - Comprehensive Metabolic Panel; Future  - Urinalysis-UC if Indicated; Future    2. Screen for colon cancer  Patient will obtain records and forward to the clinic    3. Aortic Stenosis  - Echo Complete; Future  - Ambulatory referral to Cardiology    4. Essential hypertension  - amLODIPine (NORVASC) 5 MG tablet; Take 1 tablet (5 mg total) by mouth daily.  Dispense: 90 tablet; Refill: 1    5. Submandibular swelling  Awaiting biopsy results and follow up with ENT    6. Immunity status testing  - Varicella Zoster Antibody, IgG    7. Postmenopausal status  - DXA Bone Density Scan; Future  - DXA Bone Density Scan      - The following high BMI interventions were performed this visit: encouragement to exercise        Subjective:     Romy Perez is a 70 y.o. female who presents for an annual exam. Chronic conditions include migraine headaches, hypertension, aortic stenosis, Parsonage-Mcknight syndrome.  Patient is new to my practice she presents to clinic today to establish care and for an annual physical exam.    The patient reports that there is not domestic violence in her life.     Healthy Habits:   Regular Exercise: Yes  Sunscreen Use: Yes  Healthy Diet: Yes  Dental Visits Regularly: Yes  Sexually active: Yes  Mammogram:   Colonoscopy: Yes  Prevention of Osteoporosis: Yes  Last Dexa: Yes    Immunization History   Administered Date(s) Administered     DTaP, historic 03/30/2007     Tdap 07/29/2020     Immunization status: up to date and documented.    Gynecologic History  No LMP recorded. Patient is postmenopausal.  Contraception: post menopausal status  Last mammogram: scheduled 8.6.2020.  Patient is followed by gynecology for pap.  She has an upcoming appt scheduled  OB History   No obstetric history on file.       Current Outpatient Medications   Medication Sig Dispense Refill     amLODIPine (NORVASC) 5 MG tablet Take 1 tablet  (5 mg total) by mouth daily. 90 tablet 1     calcium-vitamin D (CALCIUM-VITAMIN D) 500 mg(1,250mg) -200 unit per tablet Take 2 tablets by mouth daily.       cycloSPORINE (RESTASIS) 0.05 % ophthalmic emulsion Administer 1 drop to both eyes 2 (two) times a day.       hydrocortisone 1 % cream Apply topically 2 (two) times a day.       irbesartan (AVAPRO) 300 MG tablet TAKE ONE-HALF TABLET BY MOUTH DAILY 45 tablet 0     NON FORMULARY 1 capsule daily. Candibactrin       NON FORMULARY Take 1 capsule by mouth daily. Nutridyrr-stress essentials balance       NON FORMULARY Take 1 capsule by mouth daily. Alleerplex       NON FORMULARY Take 1 packet by mouth daily. imminocal Platinum       NON FORMULARY Take 1 capsule by mouth daily. Magnesium Lactate       omega 3-dha-epa-fish oil 100-160-1,000 mg cap        sodium chloride (OCEAN) 0.65 % nasal spray Apply 1 spray into each nostril as needed for congestion.       SUMAtriptan (IMITREX) 50 MG tablet Take 1 tablet (50 mg total) by mouth every 2 (two) hours as needed for migraine. 10 tablet 3     turmeric root extract 500 mg cap Take 1 tablet by mouth daily.       No current facility-administered medications for this visit.      No past medical history on file.  Past Surgical History:   Procedure Laterality Date     SC EXCIS TENDON SHEATH LESION, HAND/FINGER      Description: Hand Excision Of A Tendon Cyst;  Recorded: 03/07/2012;     SC EXCISE BREAST CYST      Description: Breast Surgery Lumpectomy;  Recorded: 03/07/2012;  Comments: adenoma     SC EXCISION NOSE POLYP(S),EXTENSIVE      Description: Extensive Excision Of Nasal Polyps;  Recorded: 03/07/2012;     SC KNEE SCOPE,DIAGNOSTIC      Description: Arthroscopy Knee Left;  Recorded: 03/07/2012;     SC REMOVAL OF OVARY(S)      Description: Oophorectomy;  Recorded: 06/26/2013;     US GUIDED NEEDLE PLACEMENT  7/20/2020     Shellfish containing products and Acetaminophen  No family history on file.  Social History     Socioeconomic  History     Marital status:      Spouse name: Not on file     Number of children: Not on file     Years of education: Not on file     Highest education level: Not on file   Occupational History     Not on file   Social Needs     Financial resource strain: Not on file     Food insecurity     Worry: Not on file     Inability: Not on file     Transportation needs     Medical: Not on file     Non-medical: Not on file   Tobacco Use     Smoking status: Never Smoker     Smokeless tobacco: Never Used   Substance and Sexual Activity     Alcohol use: Not on file     Drug use: Not on file     Sexual activity: Not on file   Lifestyle     Physical activity     Days per week: Not on file     Minutes per session: Not on file     Stress: Not on file   Relationships     Social connections     Talks on phone: Not on file     Gets together: Not on file     Attends Spiritism service: Not on file     Active member of club or organization: Not on file     Attends meetings of clubs or organizations: Not on file     Relationship status: Not on file     Intimate partner violence     Fear of current or ex partner: Not on file     Emotionally abused: Not on file     Physically abused: Not on file     Forced sexual activity: Not on file   Other Topics Concern     Not on file   Social History Narrative     Not on file     Social History     Social History Narrative     Not on file       Review of Systems  General:  Negative except as noted above  Eyes: Negative except as noted above  Ears/Nose/Throat: Negative except as noted above  Cardiovascular: Negative except as noted above  Respiratory:  Negative except as noted above  Gastrointestinal:  Negative except as noted above  Musculoskeletal:  Negative except as noted above  Skin: Negative except as noted above  Neurologic: Negative except as noted above  Psychiatric: Negative except as noted above  Endocrine: Negative except as noted above  Heme/Lymphatic: Negative except as noted above  "  Allergic/Immunologic: Negative except as noted above      Objective:      Vitals:    07/29/20 1252   BP: 122/74   Pulse: 83   Temp: 98.5  F (36.9  C)   TempSrc: Temporal   SpO2: 94%   Weight: 195 lb (88.5 kg)   Height: 5' 2.75\" (1.594 m)     Wt Readings from Last 3 Encounters:   07/29/20 195 lb (88.5 kg)   06/24/20 198 lb (89.8 kg)   05/27/20 198 lb (89.8 kg)     Body mass index is 34.82 kg/m . (>25?)    Physical Exam:  General Appearance: Alert, cooperative, no distress.  Head: Normocephalic, without obvious abnormality, atraumatic  Eyes: PERRL, conjunctiva/corneas clear, EOM's intact  Ears: Normal TM's and external ear canals, both ears  Nose: Nares normal, septum midline,mucosa normal, no drainage  Throat: Lips, mucosa, and tongue normal  Neck: Supple, symmetrical, trachea midline, no adenopathy;  thyroid: not enlarged, symmetric, no tenderness/mass/nodules  Lungs: Clear to auscultation bilaterally, respirations unlabored  Heart: Regular rate and rhythm, S1 and S2 normal, no murmur, rub, or gallop  Abdomen: Soft, non-tender, bowel sounds active all four quadrants,  no masses, no organomegaly  Extremities: Extremities normal, atraumatic, no cyanosis or edema  Skin: Skin color, texture, turgor normal, no rashes or lesions  Lymph nodes: Cervical, supraclavicular, and axillary nodes normal  Neurologic: Normal  Psych: Normal affect.  Does not appear anxious or depressed.     Little interest or pleasure in doing things: Not at all  Feeling down, depressed, or hopeless: Several days  No data recorded        "

## 2021-06-10 NOTE — TELEPHONE ENCOUNTER
Spoke to patient and explained that her pathology results are still in process and we will call her once we get them and Dr. Shipley has reviewed them. She agreed.    Ilda Sinclair RN  [facility]  [phone]

## 2021-06-10 NOTE — TELEPHONE ENCOUNTER
Spoke with Romy today regarding her SMG biopsy results.     PATHOLOGY:  LEFT SUBMANDIBULAR GLAND, FINE NEEDLE ASPIRATION:     - BENIGN; EPITHELIAL CELLS WITH ONCOCYTIC METAPLASIA, CRUSHED LYMPHOCYTES    The biopsy does not show any cells concerning for malignancy, the U/S description is consistent with a cyst. Reassured her that there does not appear to be any pathology of concern and no further workup or intervention is recommended at this time. She is reassured. If she notices growth or changes, she is welcome to return for re-eval.

## 2021-06-10 NOTE — TELEPHONE ENCOUNTER
"Called and left  for patient. long term through the voicemail it said \"thank you for your call\" and hung up.    If patient calls back: DXA is every 2 years. Colonoscopy is every 10 years unless they've had abnormal results then every 5   "

## 2021-06-10 NOTE — TELEPHONE ENCOUNTER
Pt is calling for the results to be explained to her again. She stated that she didn't understand and/or forgot what was said. Please call the patient at [phone]

## 2021-06-10 NOTE — TELEPHONE ENCOUNTER
Who is calling:  Patient  Reason for Call:  Patient states she had her last Dexa Scan three years ago and last colonoscopy four years ago.  Please advise as to when to repeat tests.  Date of last appointment with primary care: NA  Okay to leave a detailed message: Yes

## 2021-06-10 NOTE — TELEPHONE ENCOUNTER
Medication Question or Clarification  Who is calling: Romy  What medication are you calling about (include dose and sig)?: Metoprolol 25 mg, one daily  Who prescribed the medication?: Diana Coello CNP   What is your question/concern?: Why did Diana order this?  Romy has been taking amlodipine and irbesartan and has not been informed of any medication changes.  [facility]ed [facility]acy: [facility] to leave a detailed message?: Yes

## 2021-06-10 NOTE — TELEPHONE ENCOUNTER
I called the patient and let her know that she is to start taking it prior to Cardiology appointment on 9/11/2020 per the lab notes from Diana Coello CNP. It was a recommendation from Cardiology to start to assist in helping her current symptoms.     Patient thanked for the call.

## 2021-06-11 NOTE — TELEPHONE ENCOUNTER
Wellness Screening Tool  Symptom Screening:  Do you have one of the following NEW symptoms:    Fever (subjective or >100.0)?  No    A new cough?  No    Shortness of breath?  No     Chills? No     New loss of taste or smell? No     Generalized body aches? No     New persistent headache? No     New sore throat? No     Nausea, vomiting, or diarrhea?  No    Within the past 2 weeks, have you been exposed to someone with a known positive illness below:    COVID-19 (known or suspected)?  No    Chicken pox?  No    Mealses?  No    Pertussis?  No    Patient notified of visitor policy- They may have one person accompany them to their appointment, but they will need to wear a mask and will be screened upon arrival for symptoms: Yes  Pt informed to wear a mask: Yes  Pt notified if they develop any symptoms listed above, prior to their appointment, they are to call the clinic directly at [phone] for further instructions.  Yes  Patient's appointment status: Patient will be seen in clinic as scheduled on 9/11/20

## 2021-06-12 ENCOUNTER — COMMUNICATION - HEALTHEAST (OUTPATIENT)
Dept: INTERNAL MEDICINE | Facility: CLINIC | Age: 71
End: 2021-06-12

## 2021-06-12 DIAGNOSIS — I42.2 HYPERTROPHIC CARDIOMYOPATHY (H): ICD-10-CM

## 2021-06-13 NOTE — TELEPHONE ENCOUNTER
Dr Arnold, please review. Pt on BB therapy. Any recommendations? Await complete report/monitor? MELINARn

## 2021-06-13 NOTE — TELEPHONE ENCOUNTER
Pt being referred for PVI clinic, has not been seen by EP before. Will forward to scheduling to set up EP NP appt in Afib clinic so they can discuss PVI and if pt is a candidate for PVI clinic.  Attempted to contact pt to discuss plan, unable to leave message at number noted.  Junior LUKE we are setting pt up with EP NP regarding PVI candidate.  With the short timeline I do not think she will be able to see either of the EPMDs.    Karuna- can you please schedule pt to see EP NP to discuss Afib treatment and determine if she is a good candidate for PVI clinic?    Thanks!    Ely

## 2021-06-13 NOTE — TELEPHONE ENCOUNTER
----- Message from Hossein Arnold MD sent at 12/14/2020  1:13 PM CST -----  Please schedule this patient in PVI clinic (either Greg or Jad) sometime in the next month, if possible.   Thank you,  CHERYL Arnold

## 2021-06-13 NOTE — TELEPHONE ENCOUNTER
Wow! I'm shocked he has openings!! Yes that's ok as long as its not in PVI clinic!!    Thanks for being on top of it (like always!!)    Ely

## 2021-06-13 NOTE — TELEPHONE ENCOUNTER
----- Message from NAHUM Gardner sent at 12/11/2020  6:51 AM CST -----  Regarding: NAIF Notification  We received a notification for new onset afib/flutter with the patient reporting symptoms of heart racing. The report can be found in the g drive. Thank you.

## 2021-06-13 NOTE — TELEPHONE ENCOUNTER
Phone call to patient - informed her of Dr. Arnold's recommendations after review of NAIF transmission - patient confirmed she had sx at time of transmission and activated monitor - patient verbalized understanding of need to start Diltiazem in addition to taking Metoprolol Succ and to sched f/u as recommended - patient offered questions/concerns that were addressed and will plan to call during interim if she develops any new or worsening sx prior to f/u - call transf to sched.  mg

## 2021-06-13 NOTE — TELEPHONE ENCOUNTER
Appears to be atrial fibrillation.  Add diltiazem  mg daily    Will need to arrange follow-up to discuss anticoagulation and stroke prevention. Can be with me or in afib clinic,  JACQUIE

## 2021-06-14 ENCOUNTER — COMMUNICATION - HEALTHEAST (OUTPATIENT)
Dept: INTERNAL MEDICINE | Facility: CLINIC | Age: 71
End: 2021-06-14

## 2021-06-14 DIAGNOSIS — I42.2 HYPERTROPHIC CARDIOMYOPATHY (H): ICD-10-CM

## 2021-06-14 NOTE — TELEPHONE ENCOUNTER
Referral paperwork for [facility] completed, online referral form, and OV/tests printed and faxed, 23 pages total. Task completed. MELINA,Rn  PC to patient to notify of referral completion. MELINA,Rn

## 2021-06-14 NOTE — TELEPHONE ENCOUNTER
pc to patient, she would like to proceed with ILR.  Questions answered.  She has been informed the procedure will take place in the mobile cath lab unit.  Prep and order to scheduling.  DARCY

## 2021-06-14 NOTE — TELEPHONE ENCOUNTER

## 2021-06-14 NOTE — TELEPHONE ENCOUNTER
Refill Request  Did you contact pharmacy: Yes - [facility] has told patient clinic is not responding  Medication name: IRBESARTAN    Who prescribed the medication: PCP  Requested Pharmacy: [facility]   Is patient out of medication: only has 4 pills  Patient notified refills processed in 3 business days:  yes  Okay to leave a detailed message: yes    I called [facility] and provided updated phone and fax number.

## 2021-06-14 NOTE — TELEPHONE ENCOUNTER
----- Message from Hossein Arnold MD sent at 1/22/2021  2:53 PM CST -----  Please send a referral to cardiac surgery at [facility] for consideration of septal myectomy for hypertrophic obstructive cardiomyopathy.

## 2021-06-14 NOTE — TELEPHONE ENCOUNTER
----- Message from Karmen Amador RN sent at 1/18/2021  9:15 AM CST -----    ----- Message -----  From: Paul Garza MD  Sent: 1/15/2021   6:56 PM CST  To: Gabrielle Marino Rn Support [facility] team!  Please see my note. We will interview please reach out to the patient next week and see if she wishes to move forward with ILR implant. Vendor: [employer] Thanks Paul

## 2021-06-14 NOTE — PROGRESS NOTES
H&P SWA 1/15 Teach  I Order X P Order X Letter    COVID  Anticoag eliquis  Meds  All ok am of     1950  Home:[phone] (home) Cell:[phone] (mobile)  Emergency Contact: Don El     Important patient information for CSC/Cath Lab staff : None    [facility] EP Cath Lab Procedure Order     Device Implant/Revision:  Procedure: New Implant  Device Type: loop Loop  Device Company/Device Rep Needed for Procedure: [employer]    Diagnosis:  Cardiomyopathy  Anticipated Case Duration:  Standard/Default Time  Scheduling Needs Timeframe:  COVID Scheduling within 30 days (Tier 2)  Scheduling Restrictions: None  Scheduling Contact: Please contact pt to schedule, if you are unable to schedule date within the next 24 hours please contact pt to update on scheduling process  Pre-Procedural Testing needed: COVID 19 nasal/lab test within 48hrs of procedure  Anesthesia:  Conscious Sedation- CV RN to administer  Research Protocol:  No    [facility] EP Cath Lab Prep   Ordering Provider: Dr Garza  Ordering Date: 1/20/2021  Orders Status: Intial order placed and Order set placed    H&P:  Compled by SWA  on 1/15/21 if scheduled within 30 days, pt to schedule with PMD if procedure outside of this timeframe  PCP: Diana Coello, CNP, [phone]    Pre-op Labs: N/A for procedure    Medical Records Pertinent for Procedure:  ACT/Holter NAIF 11/16/20, CT/MRI 9/21/20, Stress Test and Echo stress test 11/16/20, echo 1/7/21  Most Recent Lab Results: BMP- Within Normal Limits Heme- Within Normal Limits    Patient Education:    Teach with Patient: Will be completed via phone prior to procedure, and letter was also sent to pt via mail/[employer] with written pre-procedural instructions.    Risks Reviewed:   >1% risk of infection or bleeding    Pre-Procedure Instructions that were Reviewed with Patient:  NPO after midnight, Remove all jewelry prior to coming in for procedure, Shower prior to arrival, Notified patient of time and date of procedure  by CV , Transportation arrangements needed s/p procedure, Post-procedure follow up process, Sedation plan/orders and Pre-procedure letter was sent to pt by CV     Pre-Procedure Medication Instructions:  Instructions given to pt regarding anticoagulants: Eliquis- instructed to continue anticoagulation uninterrupted through their procedure  Instructions given to pt regarding antiarrhythmic medication: N/A for this type of procedure; N/A  Instructions for medication, other than anticoagulants/antiarrhythmics listed above, given to pt: to take all morning medications with small sips of water, with the exception of OTC supplements and MVI  Allergies: Reviewed allergies, no concerns regarding orders for procedure    Allergies   Allergen Reactions     Shellfish Containing Products Shortness Of Breath     Acetaminophen        Current Outpatient Medications:      apixaban ANTICOAGULANT (ELIQUIS) 5 mg Tab tablet, Take 1 tablet (5 mg total) by mouth 2 (two) times a day., Disp: 60 tablet, Rfl: 11     calcium-vitamin D (CALCIUM-VITAMIN D) 500 mg(1,250mg) -200 unit per tablet, Take 2 tablets by mouth daily., Disp: , Rfl:      cycloSPORINE (RESTASIS) 0.05 % ophthalmic emulsion, Administer 1 drop to both eyes 2 (two) times a day., Disp: , Rfl:      hydrocortisone 1 % cream, Apply topically 2 (two) times a day., Disp: , Rfl:      irbesartan (AVAPRO) 300 MG tablet, Take 0.5 tablets (150 mg total) by mouth daily., Disp: 45 tablet, Rfl: 0     metoprolol succinate (TOPROL-XL) 100 MG 24 hr tablet, Take 1 tablet (100 mg total) by mouth 2 (two) times a day., Disp: 180 tablet, Rfl: 3     NON FORMULARY, 1 capsule daily. Candibactrin, Disp: , Rfl:      NON FORMULARY, Take 2 capsules by mouth daily. Nutridyrr-stress essentials balance , Disp: , Rfl:      NON FORMULARY, Take 1 capsule by mouth daily. Alleerplex, Disp: , Rfl:      NON FORMULARY, Take 1 packet by mouth daily. imminocal Platinum, Disp: , Rfl:      NON FORMULARY,  Take 1 capsule by mouth daily. Magnesium Lactate, Disp: , Rfl:      omega 3-dha-epa-fish oil 100-160-1,000 mg cap, 2 capsules in the morning and 1 capsule at night, Disp: , Rfl:      sodium chloride (OCEAN) 0.65 % nasal spray, Apply 1 spray into each nostril as needed for congestion., Disp: , Rfl:      turmeric root extract 500 mg cap, Take 1 tablet by mouth daily., Disp: , Rfl:     Documentation Date:1/20/2021 8:50 AM  Karmen Amador RN

## 2021-06-15 ENCOUNTER — COMMUNICATION - HEALTHEAST (OUTPATIENT)
Dept: CARDIOLOGY | Facility: CLINIC | Age: 71
End: 2021-06-15

## 2021-06-15 NOTE — TELEPHONE ENCOUNTER
----- Message from Seda Davis sent at 3/11/2021 10:31 AM CST -----  Regarding: Catalina-  Call back  General phone call: [facility]    Caller: Kami from  Appeals  Primary cardiologist: Catalina  Detailed reason for call: Referral Appeals question  New or active symptoms? na  Best phone number: [phone]  Best time to contact: any  Ok to leave a detailed message? yes  Device? no    Additional Info:   Please call by tomorrow as  would like to have director take a look at appeal.

## 2021-06-15 NOTE — TELEPHONE ENCOUNTER
"----- Message from SHALOM Carvalho sent at 2/15/2021  4:03 PM CST -----  Regarding: JACQUIE PATIENT  General phone call:    Caller: TOMMY FROM [facility]    Primary cardiologist: JACQUIE    Detailed reason for call: RE; REFERRAL TO [facility] phone number: [phone]    Best time to contact: ANY    Ok to leave a detailed message? YES    Device? YES    Additional Info:      PC with Tommy, whom states that receipt of letter from JACQUIE. Informs writer that there is an in-network provider whom has preformed the surgery, Dr. Redman thru [facility]. They would recommend surgery with that provider. Informed writer that provider can fill out and request an \"in network\" exception on behalf of the patient. Faxed forms to writer. Informed would have to ask/approach provider once obtained. Provider in clinic tomorrow and will discuss. Tommy will be out of the office tomorrow if contact needed to callback Jason at [phone]. Await forms, and approach JACQUIE tomorrow to discuss. MELINA,Rn       "

## 2021-06-15 NOTE — TELEPHONE ENCOUNTER
----- Message from Carmen Corbin V sent at 2/24/2021  2:04 PM CST -----  General phone call: called to check on status of in network benefit request to [facility].      Caller: RICHIE SANDERS  Primary cardiologist: RIRI  Detailed reason for call: SEE ABOVE  New or active symptoms? NO  Best phone number: [phone]  Best time to contact: ANYTIME  Ok to leave a detailed message? YES  Device? YES    Additional Info:

## 2021-06-15 NOTE — PATIENT INSTRUCTIONS - HE
Implantable Loop Recorder (ILR) Post-operative Checklist      The Device Registered Nurse (RN) reviewed the device function.      The Device RN did a wound assessment and wound care teaching.    Please call the Device Nurses with any signs of infection or questions regarding wound healing. Device Nurse Line: [phone], Option #3.      The Device RN demonstrated and displayed the specific remote monitoring system for your device.      The Device RN reviewed the Partnership Agreement Form.    Patient Instructions    You were provided with a device identification (ID) in the hospital. Please carry it with you.       You may travel by any mode of transportation; just show your device ID card.       For any surgery, let your doctor know you have an implantable loop recorder.       Your device is MRI safe       Please call the Device Clinic after each time you use your Patient Symptom Activator.           Device Clinic Contact Information  Device Nurses: [phone], Option #3.  Device Remote Specialists: [phone], Option #2. For questions about your Remote Transmission or Transmission Schedule  Device Schedulers: [phone], Option #1

## 2021-06-15 NOTE — TELEPHONE ENCOUNTER
Spoke with Kami in appeals about surgery at [facility], paperwork had been sent by HIS and discussed with Kami that Dr. Arnold wants to to go to [facility] and pt also want to go to [facility]. Will wait appeal process.

## 2021-06-15 NOTE — TELEPHONE ENCOUNTER
"Dr. Arnold I have the forms \"Prior authorization for an In-network Benefits\"-Medical services. Please review and deem if appropriate to complete request. CMM,Rn   "

## 2021-06-15 NOTE — TELEPHONE ENCOUNTER
Received message on 2/5/21 from patient with concern for infection at LR site. Returned call on 2/8/21 to discuss LR site. Patient stated that she feels that over the weekend the site appears to be healing well and she is no longer worried about it being infected. Reviewed signs and symptoms of infection to watch for and when to call in for further evaluation. Patient verbalized understanding and agrees with plan. No further questions or concerns at this time. -AJM

## 2021-06-15 NOTE — TELEPHONE ENCOUNTER
Forms completed. Fax returned to [employer]. Copy sent to [facility] to have scanned into media. Katy SUMMERS

## 2021-06-15 NOTE — TELEPHONE ENCOUNTER
===View-only below this line===  ----- Message -----  From: Seda Davis  Sent: 2/11/2021   3:04 PM CST  To: Dexter Rodarte RN  Subject: hospitalsbe-  for [facility] referral                      Taina with [employer] insurance calling in regards to [facility] referral that was placed for patient. Would like to know why [facility] was chosen as its out of patients network.     Please call [phone] can leave detailed message

## 2021-06-15 NOTE — TELEPHONE ENCOUNTER
Late entry.Incoming fax 3/1/21 with denial from [facility] to have the surgery at [facility], covered as in-network. Scanned into media. CMM,Rn     Incoming fax from [employer], that an appeal or greivance appeal review has been requested. Sent to [facility] to have scanned into EHR. CMM,Rn

## 2021-06-15 NOTE — TELEPHONE ENCOUNTER
PC to patient and discussion.  Letter sent to [employer] by patient, to start appeal process. Patient provided data and statistics of [place] vs [facility] and the proposed surgery. Patient offered to get copy of this letter to provider for review.  Patient received a phone call today, and was informed that [facility] received the letter from the patient. States that her cousin had the proposed surgery at [facility], had [employer] insurance and after 3 appeals, it was approved.       Patient has already received confirmation from [facility], and ready to proceed with the consult and schedule surgery. Patient has placed the process on hold until some determination from [employer].    Patient states that [facility] and the appeal process is looking for a reason as to why [facility], and a letter from provider to help give input for the appeal process. Informed patient will forward to [facility], and will return call with his input. No promises made if provider will write a letter, etc., will pose the request.  Patient appreciative of any and all input. She will push forward with the appeals. Informed patient once an answer obtained will notify patient and return call to [facility] to review/discuss.     Dr Arnold, patient is requesting letter to support her appeal to have the proposed heart surgery at [facility]. Would you be willing to provide this letter? Any other input? Also, [facility] has called and is requesting a reason for why [facility] for the proposed surgery? Thank you CMM,Rn

## 2021-06-15 NOTE — PROGRESS NOTES
DEVICE CLINIC RN POST-OP NOTE    Reason for visit: 1 week PO ILR check      Device: [employer] Linq Reveal   Procedure date: 1/29/2021   Implant Diagnosis: Hypertrophic Cardiomyopathy, Atrial Fibrillation  Implanting Physician: Dr. Paul Garza        Assessment  Subjective: Patient reports feeling fine with mild tenderness at incisional site  Vitals:   Vitals:    02/05/21 0918   BP: 138/80   Pulse: (!) 48   Resp: 16   Temp: 98  F (36.7  C)       Incision/device pocket: Steri-strips were absent. Incision appears clean, dry, intact with edges approximated. There is an abrasion noted superior to incision and an abrasion with scab formation inferior to incision. This was cleaned and dressed with gauze today. Patient was instructed to remove gauze in 24hrs and to avoid getting area wet for 3 days.       Device Findings  Interrogation of device reveals Normal device function.   See the [facility] Report for a full summary of the device information.          Patient Education  Romy Perez was unaccompanied today.    [facility]'s Partnership Agreement for Device Patients and Post-operative Checklist were presented and reviewed at today's appointment.      Signs and symptoms of infection, poor wound healing, and device function were reviewed.  She was issued a Carelink remote monitor and instructed on its set-up and use for remote monitoring by the [employer] representative prior to hospital discharge. These instructions were reviewed with the patient at today s visit.       Plan  - 3 month remote check scheduled 5/5/2021

## 2021-06-15 NOTE — TELEPHONE ENCOUNTER
I have pended a letter in the Communications tab in her chart. Please verify the addressee and edit as needed. Then you can print and send to  to include in her appeal.    Thank you  JACQUIE

## 2021-06-15 NOTE — TELEPHONE ENCOUNTER
Incoming message left via patient. States that [employer] need an appeal letter from provider as to need for surgery at [facility]. Requests return call to discuss.CMM,Rn

## 2021-06-16 ENCOUNTER — RECORDS - HEALTHEAST (OUTPATIENT)
Dept: INTERNAL MEDICINE | Facility: CLINIC | Age: 71
End: 2021-06-16

## 2021-06-16 PROBLEM — I48.91 A-FIB (H): Status: ACTIVE | Noted: 2021-01-21

## 2021-06-16 PROBLEM — E66.01 MORBID OBESITY (H): Status: ACTIVE | Noted: 2021-01-15

## 2021-06-16 PROBLEM — I42.2 HYPERTROPHIC CARDIOMYOPATHY (H): Status: ACTIVE | Noted: 2021-01-15

## 2021-06-16 PROBLEM — I48.0 PAROXYSMAL ATRIAL FIBRILLATION (H): Status: ACTIVE | Noted: 2021-01-15

## 2021-06-16 NOTE — TELEPHONE ENCOUNTER
Remote device interrogation reviewed.  ILR confirmed sustained episode of atrial fibrillation lasting approximately 13.5 hours (symptomatic).  No change in current medical therapy as we continue to monitor her atrial fibrillation burden.  No evidence of significant ventricular arrhythmia.  Follow-up as previously outlined.

## 2021-06-16 NOTE — TELEPHONE ENCOUNTER
===View-only below this line===  ----- Message -----  From: Hossein Arnold MD  Sent: 3/31/2021   1:40 PM CDT  To: Dexter Rodarte RN    -would you be willing to write any further letters on her behalf?      - I don't know what else I would or could say in another letter that I have not already addressed.    -Any knowledge of having the surgery done in [place]? @ the [facility]?       -  I am not familiar with the surgeons that are currently at [facility] to give a recommendation.    -Daughter, has to see a cardiologist at [facility] to move forward with assessment of her heart. Any recommendations?    - I do not personally know the cardiologists at [facility].    Dr. Zabala is one of our surgeons at [facility]. He is fairly early in his career so he does not have a high volume of septal myectomy cases but he is a good surgeon and has an interest in HCM. If the surgery would be done at the [facility], he would be the surgeon I would recommend.    JACQUIE

## 2021-06-16 NOTE — TELEPHONE ENCOUNTER
Type: alert loop recorder remote transmission for AF.   Presenting rhythm: ventricular sensing, appears sinus 55 bpm.  Battery status: OK.  Arrhythmias: since 2/5/21; one AF episode on 3/15/21 lasting 13.5hrs, v-rates >/=120bpm 25%, AF burden 1.2%. 6 pause episodes, all appear to be normal sinus with R-wave undersensing.  Anticoagulant: apixaban.  Comments: normal pacemaker function. Routed to device RN for review. TENISHA.  ADD: See epic for note. BK    Reviewed with patient. She reports the AF woke up her and was up for several hours. She was able to go back to sleep and was surprised to hear the episode was 13.5hrs long. Overall she is feeling fine at this time.       Will route to Dr. Garza since this appears to be the first AF seen on LR.     Kelsey Kuhn RN BSN PHN  Device Nurse   [facility]

## 2021-06-16 NOTE — TELEPHONE ENCOUNTER
Dr. Arnold, please review. Pt wondering how far back on her family tree should family members be checked for HoCM? She has a cousin, on her fathers' side and wonders if should get checked. Dr. Arnold please advise. Thank you MELINA,Rn

## 2021-06-16 NOTE — TELEPHONE ENCOUNTER
Incoming paperwork from [employer], after panel review denying surgery at [facility]. Pt has options to appeal if desired. Will have fax scanned into media, and ml to JACQUIE to inform. Pt also had incoming call, and LM that the panel review was denied. MELINA,RN

## 2021-06-16 NOTE — TELEPHONE ENCOUNTER
PC and discussion with patient recommendations from JACQUIE. Pt states that she has found the HCMA online which has given her some information. No further questions at this time. She will continue on the process of appeals, but keep timeline given by provider in mind.  MELINA,Rn

## 2021-06-16 NOTE — TELEPHONE ENCOUNTER
===View-only below this line===  ----- Message -----  From: Hossein Arnold MD  Sent: 4/6/2021  12:45 PM CDT  To: Dexter Rodarte RN    I would recommend the surgery be performed within the next 6 months.  JKH

## 2021-06-16 NOTE — TELEPHONE ENCOUNTER
PC with patient. And discussion of response from provider. Pt has connected with [facility] surgeon to request a letter. Pt requests a few copies of J letter, to be able to send to the [place] commissioner to review. Also will have need for receipt of some records from Cardiology, for continuation in appeal. Will also send a couple of [employer] forms with letters. Staff message sent to HIS team to help with records needed.     Pt requests to ask Dr. Garza, cardiologist whom she has also seen in the past for a letter, if willing, to help strengthen her case and to have septal myectomy at the [facility] in [place]. Will forward to Dr. Garza to make request, on behalf of the patient. Dr. Garza, please review and advise.     Finally, Dr. Arnold as patient goes through appeal process is there a timeframe you would recommend she have the surgery scheduled by as to do no harm? Please advise. CMM,Rn

## 2021-06-16 NOTE — TELEPHONE ENCOUNTER
===View-only below this line===  ----- Message -----  From: Hossein Arnold MD  Sent: 4/13/2021  10:34 AM CDT  To: Dexter Rodarte RN, Paul Garza MD    I agree. Thank you.  Hossein  ----- Message -----  From: Paul Garza MD  Sent: 4/13/2021   8:21 AM CDT  To: Dexter Rodarte RN, MD Hossein Rowan,  The only addition I would make to the proposed plan with the to ensure that the cardiac surgeon is aware of her atrial fibrillation and if possible that she would undergo MAZE and possible CARLOTTA ligation at the time of her septal myectomy.  Thanks  Paul Pedraza to encounter notes.-[facility]

## 2021-06-16 NOTE — TELEPHONE ENCOUNTER
"Incoming call from pt. Pt informs writer that one of her last recourse for the surgery, is to go to the commissioner of the [facility], which she will be doing. Pt will also be reaching out to [facility] to ask the surgeon who has already accepted her a a patient to writer a letter. She will also be talking with the financial piece at [facility], as Medicare will cover 80%, to find out what the out of pocket for her would possibly be if she moves forward. She is concerned of having the [facility] as their web site only gives one sentence about \"atrial cardiomyopathy\" and there are no statistics, or data to ease her concerns.   She also has heard from Tawana Guerrero at the [facility] genetic counselor. She would be preforming a saliva swab to check for 92 genetic tests for her understanding but also for her family. Romy is appreciative of all efforts on her behalf.    -would you be willing to write any further letters on her behalf?  -Any knowledge of having the surgery done in [place]? @ the [facility]?  -Daughter, has to see a cardiologist at [facility] to move forward with assessment of her heart. Any recommendations?    Informed patient will send update to [facility], and return call once any further discussion. Dr. Arnold, also forwarded/cc'd the denial from [facility]. CMM,Rn     "

## 2021-06-16 NOTE — TELEPHONE ENCOUNTER
===View-only below this line===  ----- Message -----  From: Hossein Arnold MD  Sent: 4/20/2021   2:25 PM CDT  To: Dexter Rodarte RN    First degree relatives should be screened. A cousin could be screened, but isn't as important.  JKH

## 2021-06-16 NOTE — TELEPHONE ENCOUNTER
PC and left detailed message of response from provider. Call if any further questions. CMMARY ELLEN,Rn

## 2021-06-16 NOTE — TELEPHONE ENCOUNTER
Telephone Encounter by Priscila Nicole LPN at 2019 10:54 AM     Author: Priscila Nicole LPN Service: -- Author Type: Licensed Nurse    Filed: 2019 10:55 AM Encounter Date: 2019 Status: Signed    : Priscila Nicole LPN (Licensed Nurse)          Reviewed By     Kurt Wasserman MD on 2019 12:56   Echo Complete   Order# [id number]   Reading physician: Melody Sanchez MD Ordering physician: Kurt Wasserman MD Study date: 19   Performing Date Performing Department   2019  CARDIAC TESTING [[id number]]   Patient Information     Patient Name  Romy Perez MRN  [id number] Sex  Female              Age  1950 (68 y.o.)   Indications     Aortic stenosis   Dx: Aortic valve disorder [I35.9 (ICD-10-CM)]   Summary       No previous study for comparison.    Left ventricle ejection fraction is normal. The calculated left ventricular ejection fraction is 58%.    Normal left ventricular size and systolic function.    Normal right ventricular size and systolic function.    Mild to moderate aortic stenosis.      Measurements     Height: 63.3 in       Weight: 195 lbs       BSA: 1.98 m2         HR: 66 bpm       :        Technical Details     Technical Quality Sonographer: ALBARO  Technical Quality: limited visualization   due to poor acoustical window.Contrast Medium: Definity   Findings     Left Ventricle Normal left ventricular size and systolic function.The calculated left ventricular ejection fraction is 58%. This represents a normal ejection fraction. Mild concentric hypertrophy noted. E/e' 8 to 15, which is equivocal for estimating LV filling pressures.   Wall Scoring Resting Score Index: 1.00              The left ventricular wall motion is normal.               Right Ventricle Normal right ventricular size and systolic function.   Left Atrium Left atrium of normal size. There is no atrial septal defect.   Right Atrium Right atrium of normal size.    Aortic Valve Mild to moderate aortic stenosis. No aortic regurgitation is present.   Mitral Valve Normal mitral valve structure. No mitral stenosis present. Mild mitral regurgitation.   Tricuspid Valve Tricuspid valve not well visualized. There is no evidence of tricuspid stenosis. Trace tricuspid valve regurgitation.   Pulmonic Valve The pulmonic valve was not well visualized. Normal pulmonic valve structure and function. There is no significant pulmonic stenosis. No pulmonic regurgitation.   Thoracic Aorta No aneurysm present.   Pericardium No pericardial effusion.   Left Ventricle Measurements and Calculations     Echo LVEF calculated: 58 % (Range: 55 - 75)       IVS/PW ratio: 1.1        IVSd: 1.39 cm (Range: 0.6 - 0.9)         LV CO: 5.7 l/min       LV Ci: 2.9 l/min/m2       LV FS: 30.7 % (Range: 28 - 44)         LV PWd: 1.21 cm (Range: 0.6 - 0.9)       LV SVi: 43.8 ml/m2       LV diastolic volume index: 42.4 cm3/m2 (Range: 29 - 61)         LV mass: 217.3 g       LV mass index: 109.8 g/m2       LV systolic volume index: 17.7 cm3/m2 (Range: 8 - 24)         LV volume diastolic: 84 cm3 (Range: 46 - 106)       LV volume systolic: 35 cm3 (Range: 14 - 42)       LVIDd: 4.43 cm (Range: 3.8 - 5.2)         LVIDs: 3.07 cm (Range: 2.2 - 3.5)       LVOT SV: 86.8 cm3             Diastolic Filling     MV Avg E/e' Ratio: 9.1 cm/s       MV E' lat jeancarlos: 7.41 cm/s       MV E' med jeancarlos: 8.87 cm/s         MV lat E/e' ratio: 10.0        MV med E/e' ratio: 8.3              Shunt Ratio     LVOT SV: 86.8 cm3              Left Atrium Measurements and Calculcations     LA area 1: 14.6 cm2       LA area 2: 19.1 cm2       LA length: 3.81 cm         LA size: 3.3 cm       LA volume: 62.2 mL       LA volume index: 31.4 mL/m2         LA/AO root ratio: 1 no units              Right Ventricle Measurements and Calculations     TAPSE: 2.6 cm                 Thoracic Aorta, Pulmonary and IVC Measurements     AO root: 3.3 cm              Aortic Valve  Measurements and Calculcations     AV DIM IND VTI: 0.3        AV DIM IND jeancarlos: 0.4        AV VTI: 63.2 cm         AV area: 1.4 cm2       AV mean gradient: 20 mmHg       AV mean jeancarlos: 203 cm/s         AV peak gradient: 40.7 mmHg       AV peak jeancarlos: 319 cm/s       LVOT area: 4.15 cm2         LVOT diam: 2.3 cm       LVOT peak VTI: 20.9 cm       LVOT peak jeancarlos: 117 cm/s            Mitral Valve Measurements and Calculcations     MV E/A Ratio: 0.6        MV P 1/2 time: 60 ms       MV VTI: 27.3 cm         MV area cont eq: 3.2 cm2       MV area p 1/2 time: 3.7 cm2       MV decel slope: 4,520 mm/s2         MV decel time: 211 ms       MV mean gradient: 3 mmHg       MV mean jeancarlos: 77 cm/s         MV peak A jeancarlos: 114 cm/s       MV peak E jeancarlos: 74 cm/s       MV peak gradient: 7.0 mmHg         MV peak velocityoctiy: 132 cm/s       MVA VTI: 3.18 cm2             Tricuspid Valve Measurements and Calculcations     TR peak gradent: 14.6 mmHg       TR peak jeancarlos: 191 cm/s

## 2021-06-17 NOTE — TELEPHONE ENCOUNTER
Incoming call and VM from patient. Reports that she is still appealing approval for surgery to be covered at [facility]. She has scheduled for Wednesday, 5/26/21. Pt is on Eliquis 5mg two times a day, and needs advisement on if ok and when to stop the Eliquis for the surgery.   Dr. Arnold please review and advise. Ok to HOLD Eliquis pre-heart surgery? Any need for bridging? MELINA,RN

## 2021-06-17 NOTE — TELEPHONE ENCOUNTER
Telephone Encounter by Ely Barbosa RN at 12/14/2020  3:56 PM     Author: Ely Barbosa RN Service: -- Author Type: Registered Nurse    Filed: 12/14/2020  3:58 PM Encounter Date: 12/14/2020 Status: Signed    : Ely Barbosa RN (Registered Nurse)       Karuna Wood Megan L RN   Caller: Unspecified (Today,  1:18 PM)             I already scheduled this pt w/[facility] on 1/15/21, hope that's ok?

## 2021-06-17 NOTE — TELEPHONE ENCOUNTER
PC with the patient, and review of recommendations. Pt will follow the recommendations. Call if further questions or needs. None further at this time. Review again ok to hold Eliquis for 5 days, and remain on BB therapy Metoprolol without interruption. MELINA,RN

## 2021-06-17 NOTE — TELEPHONE ENCOUNTER
Telephone Encounter by Karmen Amador RN at 1/25/2021  2:07 PM     Author: Karmen Amador RN Service: -- Author Type: Registered Nurse    Filed: 1/25/2021  2:08 PM Encounter Date: 1/25/2021 Status: Signed    : Karmen Amador RN (Registered Nurse)       .  [facility] Ep Rn Support Pool             Loop with [facility]WA 1/29-admit at 630   COVID 1/26   H&P done      Procedure:1/29/21 Loop implant with [facility]  Education:Reviewed Pre-Intra-Post education and instructions reviewed via phone  COIVD: scheduled on 1/26/21  Pre-Op: completed and in Epic  1/25/2021 2:07 PM  Karmen Amador RN

## 2021-06-17 NOTE — TELEPHONE ENCOUNTER
Telephone Encounter by Dexter Rodarte RN at 12/11/2020 10:13 AM     Author: Dexter Rodarte RN Service: -- Author Type: Registered Nurse    Filed: 12/11/2020 10:18 AM Encounter Date: 12/11/2020 Status: Signed    : Dexter Rodarte RN (Registered Nurse)       [employer] alert notification copied into encounter:

## 2021-06-17 NOTE — TELEPHONE ENCOUNTER
Incoming call from patient stating that the [facility] surgical team is requesting a 5 day HOLD of Eliquis pre-surgery. Dr Pike ok to hold for 5 days prior? MELINA,Rn

## 2021-06-17 NOTE — TELEPHONE ENCOUNTER
Pc to patient and discussion to continue BB therapy without interruption for her heart surgery. Would also encourage the patient to ask the surgery team if a 3 day hold is sufficient. If not, and surgery team/provider request a longer hold to return call to discuss, and have JACQUIE review. Pt verbalized understanding. Will call the surgical team to discuss. JAGRUTI SUMMERS

## 2021-06-17 NOTE — TELEPHONE ENCOUNTER
Telephone Encounter by Dexter Rodarte RN at 12/11/2020 10:15 AM     Author: Dexter Rodarte RN Service: -- Author Type: Registered Nurse    Filed: 12/11/2020 10:18 AM Encounter Date: 12/11/2020 Status: Signed    : Dexter Rodarte RN (Registered Nurse)       [employer] alert notification from occurred 12/10/20:

## 2021-06-17 NOTE — TELEPHONE ENCOUNTER
===View-only below this line===  ----- Message -----  From: Hossein Arnold MD  Sent: 5/14/2021   1:59 PM CDT  To: Dexter Rodarte RN    5 day hold of Eliquis is elke DHILLON

## 2021-06-17 NOTE — TELEPHONE ENCOUNTER
PC and discussion of recommendation from provider. Pt will discuss with her surgeon, as a pharmacist for [facility] had recommended a 5 day hold. Also, patient wondering what to do with her BB, the Metoprolol. Will forward to Dr. Arnold, to ask for advisement. Dr. Arnold, please advise on Metoprolol pre-surgery and day of surgery. Any reason to hold? MELINA,Rn

## 2021-06-18 NOTE — PATIENT INSTRUCTIONS - HE
Patient Instructions by Hossein Arnold MD at 1/22/2021  1:30 PM     Author: Hossein Arnold MD Service: -- Author Type: Physician    Filed: 1/22/2021  1:58 PM Encounter Date: 1/22/2021 Status: Signed    : Hossein Arnold MD (Physician)       It was a pleasure to meet with you today.      Below is a summary of your visit.   1. I will have my nurse, Jeana, send a referral to the cardiac surgeons at [facility] to discuss septal myectomy surgery.  2. Continue your medications as prescribed  3. Continue your daily walk.  4. Follow up with me in 6 months or sooner if needed.       Please do not hesitate to call the [facility] clinic with any questions or concerns at [phone]. You can also reach my nurse, Jeana, during normal business hours at [phone].    Sincerely,

## 2021-06-18 NOTE — PATIENT INSTRUCTIONS - HE
Patient Instructions by Hossein Arnold MD at 12/14/2020 11:10 AM     Author: Hossein Arnold MD Service: -- Author Type: Physician    Filed: 12/14/2020 12:16 PM Encounter Date: 12/14/2020 Status: Signed    : Hossein Arnold MD (Physician)       It was a pleasure to meet with you today.      Below is a summary of your visit.   1. Start taking eliquis to prevent stroke from atrial fibrillation  2. Do not start taking diltiazem  3. Continue the metoprolol and other medications as prescribed  4. I will send a referral to an EP ( of the heart) doctor to discuss afib ablation.  5. Follow up with me in 3 months or sooner if needed.       Please do not hesitate to call the [facility] clinic with any questions or concerns at [phone].    Sincerely,

## 2021-06-18 NOTE — PATIENT INSTRUCTIONS - HE
Patient Instructions by Hossein Arnold MD at 10/28/2020 10:30 AM     Author: Hossein Arnold MD Service: -- Author Type: Physician    Filed: 10/28/2020 10:59 AM Encounter Date: 10/28/2020 Status: Addendum    : Hossein Arnold MD (Physician)    Related Notes: Original Note by Hossein Arnold MD (Physician) filed at 10/28/2020 10:57 AM       It was a pleasure to meet with you today.      Below is a summary of your visit.   1. Schedule a stress test and a heart rhythm monitor to look for high risk features of your hypertrophic cardiomyopathy  2. I have placed a referral to a genetic counselor to discuss genetic testing for your HCM  3. Increase your metoprolol to 100 mg twice daily  4. Follow up with me in about 6 months or sooner if needed.    You should receive a phone call from this office informing you of test or procedure results within 3 business days of the test being performed.  If you do not hear from our office with the test results within 1 week please do not hesitate to call asking for these results.     Please do not hesitate to call the [facility] clinic with any questions or concerns at [phone].    Sincerely,         Patient Education     Hypertrophic Cardiomyopathy    Hypertrophic cardiomyopathy is a condition in which the heart muscle grows abnormally thick and stiff. It most commonly affects the walls of the left ventricle and septum. This makes it hard for the heart to pump blood properly. Hypertrophic cardiomyopathy is usually inherited, although it often doesn't show up until later in life.   Hypertrophic cardiomyopathy may raise your risk for heart failure. Over time, the heart muscle may become too stiff to let the ventricle fill completely. Then the heart can't pump enough blood to meet the body's need. This can cause symptoms such as breathlessness, chest pain, palpitations, tiredness, or exhaustion when trying to exercise. Let your healthcare provider  know if you have these symptoms.  In some people, hypertrophic cardiomyopathy carries a risk for sudden cardiac death. If you have any passing out spells, tell your healthcare provider right away. If anyone in your family has  suddenly, especially at an unexpected age, tell your healthcare provider.  The condition can be managed, but there is no cure. Talk to your healthcare provider about treatment.  What are the symptoms of hypertrophic cardiomyopathy?  You may have no symptoms with hypertrophic cardiomyopathy. If symptoms do occur, they most likely appear when you exert yourself. Symptoms may include:    Problems catching your breath    Unexplained tiredness    Lightheadedness, dizzy spells, or fainting    Rapid, pounding heartbeat    Chest tightness or pressure    Swollen feet or ankles    Unexplained weight gain  What happens in your heart?  As the walls of the heart muscle thicken, it becomes harder for the heart to move blood efficiently through the chambers. Thick walls may block blood from flowing freely out to the rest of the body. It may also damage heart valves causing blood to leak backwards. A stiff heart muscle cant relax between pumps the way it should. This prevents adequate filling of the chambers and results in lower blood flow to the body with each pump. In some cases, the heart may develop an arrhythmia and beat irregularly (too fast and out of rhythm).  Treatment   There is no cure for hypertrophic cardiomyopathy, but treatment can help keep your condition from getting worse. And it can reduce your symptoms. Treatment may include medicines to help your heart pump more effectively, or to reduce arrhythmias. In some cases, surgery can destroy or remove thickened tissue to improve blood flow. Your healthcare provider will work with you to develop a treatment plan to help you feel better now and prevent problems in the future. It's very important to follow the treatment plan exactly as  directed. If you have questions or problems, talk to your healthcare provider.  Date Last Reviewed: 8/1/2019 2000-2019 [employer]. [address] [place] [address], [place] [address]. All rights reserved. This information is not intended as a substitute for professional medical care. Always follow your healthcare professional's instructions.

## 2021-06-18 NOTE — PATIENT INSTRUCTIONS - HE
Patient Instructions by Hossein Arnold MD at 9/11/2020  1:50 PM     Author: Hossein Arnold MD Service: -- Author Type: Physician    Filed: 9/11/2020  2:36 PM Encounter Date: 9/11/2020 Status: Signed    : Hossein Arnold MD (Physician)       It was a pleasure to meet with you today.      Below is a summary of your visit.   1. Schedule an MRI of your heart to better evaluate your aortic valve and look for a condition called hypertrophic cardiomyopathy  2. Increase your metoprolol to 50 mg once daily  3. Continue light to moderate exercise but avoid strenuous activity for now.  4. You can stop your amlodipine  5. Follow up with me in about 6 weeks in [place]    You should receive a phone call from this office informing you of test or procedure results within 3 business days of the test being performed.  If you do not hear from our office with the test results within 1 week please do not hesitate to call asking for these results.     Please do not hesitate to call the [facility] clinic with any questions or concerns at [phone].    Sincerely,

## 2021-06-19 NOTE — LETTER
Letter by Kurt Wasserman MD at      Author: Kurt Wasserman MD Service: -- Author Type: --    Filed:  Encounter Date: 6/19/2019 Status: (Other)         Romy Perez  [address]  [place] [address]             June 19, 2019         Dear Ms. Perez,    Below are the results from your recent visit:    Resulted Orders   Echo Complete   Result Value Ref Range    LV volume diastolic 84 46 - 106 cm3    LV volume systolic 35 14 - 42 cm3    HR 66 bpm    IVSd 1.39 (!) 0.6 - 0.9 cm    LVIDd 4.43 3.8 - 5.2 cm    LVIDs 3.07 2.2 - 3.5 cm    LVOT diam 2.3 cm    LVOT mean gradient 3 mmHg    LVOT peak VTI 20.9 cm    LVOT mean jeancarlos 75.1 cm/s    LVOT peak jeancarlos 117 cm/s    LVOT peak gradient 5 mmHg    LV PWd 1.21 (!) 0.6 - 0.9 cm    MV E' lat jeancarlos 7.41 cm/s    MV E' med jeancarlos 8.87 cm/s    AV mean jeancarlos 203 cm/s    AV mean gradient 20 mmHg    AV VTI 63.2 cm    AV peak jeancarlos 319 cm/s    AO root 3.3 cm    LA size 3.3 cm    LA/AO root ratio 1 no units    MV decel slope 4,520 mm/s2    MV decel time 211 ms    MV P 1/2 time 60 ms    MV peak A jeancarlos 114 cm/s    MV peak E jeancarlos 74 cm/s    MV mean jeancarlos 77 cm/s    MV mean gradient 3 mmHg    MV VTI 27.3 cm    MV peak velocityoctiy 132 cm/s    TR peak jeancarlos 191 cm/s    LA area 1 14.6 cm2    LA length 3.81 cm    LA area 2 19.1 cm2    TAPSE 2.6 cm    BSA 1.98 m2    Hieght 63.25 in    Weight 3,120 lbs    IVS/PW ratio 1.1     TR peak gradent 14.6 mmHg    LV FS 30.7 28 - 44 %    Echo LVEF calculated 58 55 - 75 %    LA volume 62.2 mL    LV mass 217.3 g    AV area 1.4 cm2    AV DIM IND jeancarlos 0.4     MV area p 1/2 time 3.7 cm2    MV area cont eq 3.2 cm2    MV E/A Ratio 0.6     LVOT area 4.15 cm2    LVOT SV 86.8 cm3    AV peak gradient 40.7 mmHg    MV peak gradient 7.0 mmHg    LV systolic volume index 17.7 8 - 24 cm3/m2    LV diastolic volume index 42.4 29 - 61 cm3/m2    LA volume index 31.4 mL/m2    LV mass index 109.8 g/m2    LV SVi 43.8 ml/m2    MV med E/e' ratio 8.3      MV lat E/e' ratio 10.0     LV CO 5.7 l/min    LV Ci 2.9 l/min/m2    Height 63.3 in    Weight 195 lbs    MV Avg E/e' Ratio 9.1 cm/s    AV DIM IND VTI 0.3     MVA VTI 3.18 cm2    Narrative      No previous study for comparison.    Left ventricle ejection fraction is normal. The calculated left   ventricular ejection fraction is 58%.    Normal left ventricular size and systolic function.    Normal right ventricular size and systolic function.    Mild to moderate aortic stenosis.          Overall your heart looks good.  You do have mild to moderate narrowing of your aortic valve.  Based on this we will you should have a repeat echocardiogram in 1 year.    Please call with questions or contact us using [employer].    Sincerely,        Electronically signed by Kurt Wasserman MD

## 2021-06-19 NOTE — LETTER
Letter by Kurt Wasserman MD at      Author: Kurt Wasserman MD Service: -- Author Type: --    Filed:  Encounter Date: 6/4/2019 Status: (Other)         Romy Perez  [address]  [place] [address]             June 4, 2019         Dear Ms. Perez,    Below are the results from your recent visit:    Resulted Orders   HM2(CBC w/o Differential)   Result Value Ref Range    WBC 6.7 4.0 - 11.0 thou/uL    RBC 5.18 3.80 - 5.40 mill/uL    Hemoglobin 14.6 12.0 - 16.0 g/dL    Hematocrit 43.3 35.0 - 47.0 %    MCV 84 80 - 100 fL    MCH 28.1 27.0 - 34.0 pg    MCHC 33.6 32.0 - 36.0 g/dL    RDW 13.0 11.0 - 14.5 %    Platelets 160 140 - 440 thou/uL    MPV 9.1 7.0 - 10.0 fL   Comprehensive Metabolic Panel   Result Value Ref Range    Sodium 143 136 - 145 mmol/L    Potassium 3.5 3.5 - 5.0 mmol/L    Chloride 107 98 - 107 mmol/L    CO2 25 22 - 31 mmol/L    Anion Gap, Calculation 11 5 - 18 mmol/L    Glucose 93 70 - 125 mg/dL    BUN 15 8 - 22 mg/dL    Creatinine 0.79 0.60 - 1.10 mg/dL    GFR MDRD Af Amer >60 >60 mL/min/1.73m2    GFR MDRD Non Af Amer >60 >60 mL/min/1.73m2    Bilirubin, Total 0.7 0.0 - 1.0 mg/dL    Calcium 9.6 8.5 - 10.5 mg/dL    Protein, Total 6.8 6.0 - 8.0 g/dL    Albumin 3.8 3.5 - 5.0 g/dL    Alkaline Phosphatase 60 45 - 120 U/L    AST 18 0 - 40 U/L    ALT 20 0 - 45 U/L    Narrative    Fasting Glucose reference range is 70-99 mg/dL per  American Diabetes Association (ADA) guidelines.   Lipid Cascade   Result Value Ref Range    Cholesterol 209 (H) <=199 mg/dL    Triglycerides 82 <=149 mg/dL    HDL Cholesterol 66 >=50 mg/dL    LDL Calculated 127 <=129 mg/dL    Patient Fasting > 8hrs? Yes        [facility] your laboratory studies look very good    Please call with questions or contact us using [email or web address].    Sincerely,        Electronically signed by Kurt Wasserman MD

## 2021-06-20 NOTE — LETTER
Letter by Diana Coello CNP at      Author: Diana Coello CNP Service: -- Author Type: --    Filed:  Encounter Date: 5/29/2020 Status: (Other)         Romy PedrazacarmelinaEdmond  [address]  [place] [address]             May 29, 2020         Dear Ms. Chris,    Below are the results from your recent visit:    Resulted Orders   Thyroid Stimulating Hormone (TSH)   Result Value Ref Range    TSH 2.48 0.30 - 5.00 uIU/mL   HM1 (CBC with Diff)   Result Value Ref Range    WBC 7.3 4.0 - 11.0 thou/uL    RBC 5.12 3.80 - 5.40 mill/uL    Hemoglobin 14.4 12.0 - 16.0 g/dL    Hematocrit 45.3 35.0 - 47.0 %    MCV 89 80 - 100 fL    MCH 28.1 27.0 - 34.0 pg    MCHC 31.8 (L) 32.0 - 36.0 g/dL    RDW 13.2 11.0 - 14.5 %    Platelets 175 140 - 440 thou/uL    MPV 12.4 8.5 - 12.5 fL    Neutrophils % 58 50 - 70 %    Lymphocytes % 32 20 - 40 %    Monocytes % 7 2 - 10 %    Eosinophils % 2 0 - 6 %    Basophils % 1 0 - 2 %    Neutrophils Absolute 4.2 2.0 - 7.7 thou/uL    Lymphocytes Absolute 2.3 0.8 - 4.4 thou/uL    Monocytes Absolute 0.5 0.0 - 0.9 thou/uL    Eosinophils Absolute 0.1 0.0 - 0.4 thou/uL    Basophils Absolute 0.1 0.0 - 0.2 thou/uL        TSH is normal.     Please call with questions or contact us using [employer].    Sincerely,        Electronically signed by Diana Coello CNP

## 2021-06-20 NOTE — LETTER
Letter by Diana Coello CNP at      Author: Diana Coello CNP Service: -- Author Type: --    Filed:  Encounter Date: 5/29/2020 Status: (Other)         Romy PedrazacarmelinaEdmodn  [address]  [place] [address]             May 29, 2020         Dear Ms. Chris,    Below are the results from your recent visit:    Resulted Orders   US Neck Limited    Narrative    EXAM DATE:         05/27/2020    EXAM: US SOFT TISSUE HEAD/NECK  LOCATION: [facility] RADIOLOGY [place]  DATE/TIME: 5/27/2020 11:30 AM    INDICATION: Left neck lump for the past six months.    COMPARISON: None.  TECHNIQUE: Routine.    FINDINGS: The left submandibular gland is likely larger and  different in  appearance relative to the right. The left measures 3.6 x 3.3 x 1.4 cm in the  right 3.3 x 2.9 x 1.2 cm. In addition, just superior to the midline of the gland  there is a well demarcated geographic hypoechoic area medially that measures 2 x  1.5 x 0.8 cm. No calcifications within it. The rest of the gland is hyperechoic  and normal.  No lesions noted in the right submandibular gland.    IMPRESSION:  1.  Palpable lump corresponds to a slightly enlarged left submandibular gland  with a well demarcated hypoechoic area medially in the midportion. The  ultrasound appearance is nonspecific. Considerations include low-grade  inflammation, infiltrative process such as sarcoidosis or less likely lymphoma.  Consider short-term follow-up. No visible calculi.  2.  The right submandibular gland is normal.    NOTE: ABNORMAL REPORT    THE DICTATION ABOVE DESCRIBES AN ABNORMALITY FOR WHICH FOLLOW-UP IS NEEDED.                  The ultrasound results indicate a slightly enlarged lymph node is nonspecific with recommendation for  short-term follow-up.  I would like you to follow-up in 4 weeks, sooner if needed.     Please call with questions or contact us using [employer].    Sincerely,        Electronically signed by Diana  Liz Coello, CNP

## 2021-06-20 NOTE — LETTER
Letter by Diana Coello CNP at      Author: Diana Coello CNP Service: -- Author Type: --    Filed:  Encounter Date: 8/4/2020 Status: (Other)         Romy PedrazacarmelinaEdmond  [address]  [place] [address]             August 4, 2020         Dear Ms. IzquierdoCherylNikko,    Below are the results from your recent visit:    Resulted Orders   Lipid Cascade RANDOM   Result Value Ref Range    Cholesterol 225 (H) <=199 mg/dL    Triglycerides 109 <=149 mg/dL    HDL Cholesterol 64 >=50 mg/dL    LDL Calculated 139 (H) <=129 mg/dL    Patient Fasting > 8hrs? No    Comprehensive Metabolic Panel   Result Value Ref Range    Sodium 142 136 - 145 mmol/L    Potassium 4.0 3.5 - 5.0 mmol/L    Chloride 106 98 - 107 mmol/L    CO2 25 22 - 31 mmol/L    Anion Gap, Calculation 11 5 - 18 mmol/L    Glucose 96 70 - 125 mg/dL    BUN 20 8 - 22 mg/dL    Creatinine 0.74 0.60 - 1.10 mg/dL    GFR MDRD Af Amer >60 >60 mL/min/1.73m2    GFR MDRD Non Af Amer >60 >60 mL/min/1.73m2    Bilirubin, Total 0.6 0.0 - 1.0 mg/dL    Calcium 9.5 8.5 - 10.5 mg/dL    Protein, Total 6.7 6.0 - 8.0 g/dL    Albumin 3.8 3.5 - 5.0 g/dL    Alkaline Phosphatase 72 45 - 120 U/L    AST 17 0 - 40 U/L    ALT 17 0 - 45 U/L    Narrative    Fasting Glucose reference range is 70-99 mg/dL per  American Diabetes Association (ADA) guidelines.   Urinalysis-UC if Indicated   Result Value Ref Range    Color, UA Yellow Colorless, Yellow, Straw, Light Yellow    Clarity, UA Clear Clear    Glucose, UA Negative Negative    Bilirubin, UA Small (!) Negative    Ketones, UA Trace (!) Negative    Specific Gravity, UA 1.025 1.005 - 1.030    Blood, UA Negative Negative    pH, UA 5.5 5.0 - 8.0    Protein, UA Negative Negative mg/dL    Urobilinogen, UA 1.0 E.U./dL 0.2 E.U./dL, 1.0 E.U./dL    Nitrite, UA Negative Negative    Leukocytes, UA Trace (!) Negative    Bacteria, UA        Comment:      Qns     RBC, UA        Comment:      Qns     WBC, UA        Comment:       Qns     Jarrod Cohen, UA        Comment:      Qns     Narrative    Urine Culture ordered based on [facility] criteria   Culture, Urine   Result Value Ref Range    Culture No Growth        Please see your recent lab results. I recommend following a low cholesterol diet and 30 minutes of purposeful activity most days of the week, weight loss is encouraged.     The 10-year ASCVD risk score (Shea ALLEN Jr., et al., 2013) is: 9.3%     Values used to calculate the score:       Age: 70 years       Sex: Female       Is Non- : No       Diabetic: No       Tobacco smoker: No       Systolic Blood Pressure: 122 mmHg       Is BP treated: Yes       HDL Cholesterol: 64 mg/dL       Total Cholesterol: 225 mg/dL     Please call with questions or contact us using [email or web address].    Sincerely,  Electronically signed by Diaan Coello CNP

## 2021-06-20 NOTE — LETTER
Letter by Diana Coello CNP at      Author: Diana Coello CNP Service: -- Author Type: --    Filed:  Encounter Date: 7/31/2020 Status: (Other)         Romy Perez  [address]  [place] [address]             July 31, 2020         Dear Ms. KeilacarmelinaEdmond,    Below are the results from your recent visit:    Resulted Orders   Varicella Zoster Antibody, IgG   Result Value Ref Range    Varicella Zoster Antibody IgG Positive     Narrative    Assay interference due to circulating antibodies against HIV, Hepatitis A, Hepatitis B, Hepatitis C, HAMA and Rheumatoid Factor has not been evaluated.    The assay performance in detecting antibodies to Varicella Zoster in individuals vaccinated with the FDA-licensed VZV vaccine has not been established.    Negative: Absence of detectable Varicella Zoster IgG antibodies. A negative result indicates no detectable VZV antibody, but does not rule out acute infection. It should be noted that the test usually scores negative in infected patients during the incubation period and the early stages of infection.    Equivocal: Suggest recollection.    Positive: Presence of detectable Varicella Zoster IgG antibodies. A positive result generally indicates exposure to the pathogen or administration of specific immune-globulins, but it is no indication of active infection or stage of disease.         Please see results     Please call with questions or contact us using [employer].    Sincerely,        Electronically signed by Diana Coello CNP

## 2021-06-20 NOTE — LETTER
Letter by Diana Coello CNP at      Author: Diana Coello CNP Service: -- Author Type: --    Filed:  Encounter Date: 9/9/2020 Status: (Other)         Romy Perez  [address]  [place] [address]             September 9, 2020         Dear Ms. Perez,    Below are the results from your recent visit:    Resulted Orders   DXA Bone Density Scan    Narrative    9/3/2020      RE: Romy PedrazacarmelinaEdmond  YOB: 1950        Dear Diana Coello,    Patient Profile:  70 y.o. female, postmenopausal, is here for the follow up bone density   test.   History of fractures - None. Family history of osteoporosis - None.    Family history of hip fracture: None. Smoking history - No. Osteoporosis   treatment past -  No. Osteoporosis treatment current - No.  Chronic   medical problems - Chronic low back problems. High risk medications -    None.      Assessment:    1. The spine bone density L1-L4 with T-score -1.7 and significant decline   of 3.3 % compared to 2016.  2. Femoral bone densities show left femoral neck T- score -1.5 and right   femoral neck T-score -1.2, with no statistically significant change   compared to the previous DXA scan done in 2016.  3. Trabecular bone score indicates moderate trabecular bone architecture.      70 y.o. female with LOW BONE DENSITY (OSTEOPENIA) and LOW fracture risk,   adjusted for the TBS, with major osteoporotic fracture risk 8.9% and hip   fracture risk 1.1%.         Recommendations:  Appropriate calcium, vitamin D supplements, along with balance and weight   bearing exercise recommended with follow up bone density scan in 2 years.      Bone densitometry was performed on your patient using our [employer]   densitometer. The results are summarized and a copy of the actual scans   are included for your review. In conformity with the International Society   of Clinical Densitometry's most recent position  statement for DXA   interpretation (2015), the diagnosis will be made on the lowest measured   T-score of the lumbar spine, femoral neck, total proximal femur or 33%   radius. Note the change in terminology for diagnostic classification from   OSTEOPENIA to LOW BONE MASS. All trending for sequential exams will be   done using multiple vertebrae or the total proximal femur. Fracture risk   is based on the WHO Fracture Risk Assessment Tool (FRAX). If additional   information is needed or if you would like to discuss the results, please   do not hesitate to call me.       Thank you for referring this patient to [facility].   We are happy to be of service in support of you and your practice. If you   have any questions or suggestions to improve our service, please call me   at [phone].     Sincerely,     Taj Champion M.D. C.C.D.  Osteoporosis Services, [facility]         Please see results     Please call with questions or contact us using [employer].    Sincerely,        Electronically signed by Diana Coello CNP

## 2021-06-21 NOTE — LETTER
"Letter by Dexter Rodarte RN at      Author: Dexter Rodarte RN Service: -- Author Type: --    Filed:  Encounter Date: 2/11/2021 Status: (Other)            To [employer],    I have referred Ms. 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 The septal myectomy surgery for which she is referred is an uncommon and highly specialized surgery that is not performed at all centers. It has been shown with this surgery, as well as many other uncommon surgeries and procedures, that when performed at high volume centers (centers where an individual surgeon performs at least 45 of these surgeries per year), the outcomes for the patients are significantly better with lower overall costs than when performed at a center with low surgical volumes for this procedure. A study published in 2016 in [facility] noted, \"Importantly, low-volume centers were associated with worse in-hospital outcomes, including higher mortality, longer length of stay, and higher hospital cost\" ([facility]. 2016;1(3):324-332. doi:10.1001/jamacardio.2016.0252)    Ms. Perez meets clear indications for having this surgery performed and I referred her specifically to the [facility] to have her surgery performed as it is the only high volume center and designated [facility] in the [facility] for this surgery.     I strongly urge you to approve this surgery to be performed at [facility] as requested as it is in the best interests of the health of Ms. Perez and is most likely to limit overall costs in her medical care.    Sincerely,           "

## 2021-06-21 NOTE — LETTER
Letter by Diana Coello CNP at      Author: Diana Coello CNP Service: -- Author Type: --    Filed:  Encounter Date: 2/16/2021 Status: (Other)               Romy Perez  [address]  [place] [address]      02/16/21      Dear Romy,      In reviewing your records, we have determined a gap in your preventative services.  Based on your age and health history, we recommend the following:      Colon cancer screening        If you have had the service elsewhere, or have transferred your care to another clinic, please contact us so we can update our records.     Please call [phone] to schedule an appointment.    We believe that a strong preventative care program, including regular physicals and follow-up care is an important part of a healthy lifestyle and we are committed to helping you maintain your health.    Thank you for choosing us as your health care provider.    Sincerely,    [facility]

## 2021-06-21 NOTE — LETTER
Letter by Mara Gimenez RDCS at      Author: Mara Gimenez RDCS Service: -- Author Type: --    Filed:  Encounter Date: 5/14/2021 Status: (Other)         Romy JONES TimboCherylNikko  [address]  [place] [address]      May 14, 2021      Dear Ms. Perez,    RE: Remote Results    We are writing to you regarding your recent Remote Loop Recorder check from home. Your transmission was received successfully. Battery status is satisfactory at this time.     Your results are showing no significant changes.    Your next device appointment will be a remote check on August 12, 2021.  You can choose the time of day you wish to transmit.    To schedule or reschedule, please call [phone] and press 1.    NOTE: If you would like to do an extra transmission, please call [phone] and press 3 to speak to a nurse BEFORE transmitting. This ensures that the Device Clinic staff is aware of the reason you are sending a transmission, and can follow-up with you after it has been reviewed.    We will be checking your implanted device from home (remotely) every three months unless otherwise instructed. We will need to see you in the clinic at least once a year. You may need to be seen in the clinic sooner depending on the results of your check.    Please be aware:    The follow-up schedule is like a Physician prescription.    Your remote monitor is paired to your specific implanted device.      Sincerely,    [facility]

## 2021-06-23 ENCOUNTER — COMMUNICATION - HEALTHEAST (OUTPATIENT)
Dept: INTERNAL MEDICINE | Facility: CLINIC | Age: 71
End: 2021-06-23

## 2021-06-23 DIAGNOSIS — I10 ESSENTIAL HYPERTENSION: ICD-10-CM

## 2021-06-24 ENCOUNTER — COMMUNICATION - HEALTHEAST (OUTPATIENT)
Dept: INTERNAL MEDICINE | Facility: CLINIC | Age: 71
End: 2021-06-24

## 2021-06-25 NOTE — TELEPHONE ENCOUNTER
RN cannot approve Refill Request    RN can NOT refill this medication both SIGs are dose limited.  Provider input needed for longer term dosing. Last office visit: 6/10/2021 Ely Torres FNP Last Physical: Visit date not found Last MTM visit: Visit date not found Last visit same specialty: 6/10/2021 Ely Torres FNP.  Next visit within 3 mo: Visit date not found  Next physical within 3 mo: Visit date not found      Douglas Rosado, [facility] Connection Triage/Med Refill 6/14/2021    Requested Prescriptions   Pending Prescriptions Disp Refills     potassium chloride (K-DUR,KLOR-CON) 20 MEQ tablet [Pharmacy Med Name: POTASSIUM CL 20MEQ ER TABLETS] 20 tablet 0     Sig: TAKE 2 TABLETS(40 MEQ) BY MOUTH TWICE DAILY FOR 5 DAYS       Potassium Supplements Refill Protocol Passed - 6/12/2021  3:47 AM        Passed - PCP or prescribing provider visit in past 12 months       Last office visit with prescriber/PCP: 6/10/2021 Ely Torres FNP OR same dept: 6/10/2021 Ely Torres FNP OR same specialty: 6/10/2021 Ely Torres FNP  Last physical: Visit date not found Last MTM visit: Visit date not found   Next visit within 3 mo: Visit date not found  Next physical within 3 mo: Visit date not found  Prescriber OR PCP: JANE Garland  Last diagnosis associated with med order: 1. Hypertrophic cardiomyopathy (H)  - potassium chloride (K-DUR,KLOR-CON) 20 MEQ tablet [Pharmacy Med Name: POTASSIUM CL 20MEQ ER TABLETS]; TAKE 2 TABLETS(40 MEQ) BY MOUTH TWICE DAILY FOR 5 DAYS  Dispense: 20 tablet; Refill: 0  - furosemide (LASIX) 40 MG tablet [Pharmacy Med Name: FUROSEMIDE 40MG TABLETS]; TAKE 1 TABLET(40 MG) BY MOUTH TWICE DAILY FOR 5 DAYS  Dispense: 10 tablet; Refill: 0    If protocol passes may refill for 12 months if within 3 months of last provider visit (or a total of 15 months).             Passed - Potassium level in last 12 months     Lab Results   Component Value Date    Potassium 3.8 06/10/2021                 furosemide (LASIX) 40 MG tablet [Pharmacy Med Name: FUROSEMIDE 40MG TABLETS] 10 tablet 0     Sig: TAKE 1 TABLET(40 MG) BY MOUTH TWICE DAILY FOR 5 DAYS       Diuretics/Combination Diuretics Refill Protocol  Passed - 6/12/2021  3:47 AM        Passed - Visit with PCP or prescribing provider visit in past 12 months     Last office visit with prescriber/PCP: 6/10/2021 Ely Torres FNP OR same dept: 6/10/2021 Ely Torres FNP OR same specialty: 6/10/2021 Ely Torres FNP  Last physical: Visit date not found Last MTM visit: Visit date not found   Next visit within 3 mo: Visit date not found  Next physical within 3 mo: Visit date not found  Prescriber OR PCP: JANE Garland  Last diagnosis associated with med order: 1. Hypertrophic cardiomyopathy (H)  - potassium chloride (K-DUR,KLOR-CON) 20 MEQ tablet [Pharmacy Med Name: POTASSIUM CL 20MEQ ER TABLETS]; TAKE 2 TABLETS(40 MEQ) BY MOUTH TWICE DAILY FOR 5 DAYS  Dispense: 20 tablet; Refill: 0  - furosemide (LASIX) 40 MG tablet [Pharmacy Med Name: FUROSEMIDE 40MG TABLETS]; TAKE 1 TABLET(40 MG) BY MOUTH TWICE DAILY FOR 5 DAYS  Dispense: 10 tablet; Refill: 0    If protocol passes may refill for 12 months if within 3 months of last provider visit (or a total of 15 months).             Passed - Serum Potassium in past 12 months      Lab Results   Component Value Date    Potassium 3.8 06/10/2021             Passed - Serum Sodium in past 12 months      Lab Results   Component Value Date    Sodium 147 (H) 06/10/2021             Passed - Blood pressure on file in past 12 months     BP Readings from Last 1 Encounters:   06/10/21 122/60             Passed - Serum Creatinine in past 12 months      Creatinine   Date Value Ref Range Status   06/10/2021 1.23 (H) 0.60 - 1.10 mg/dL Final

## 2021-06-25 NOTE — TELEPHONE ENCOUNTER
"Incoming call and request for return call to discuss. Laura home care nurse was given writers' phone number from the patient to call with a couple of questions. Requests return call to discuss. Katy SUMMERS     PC to Laura, home care nurse and informed that if concerns regarding healing of (2) CT sites, to call surgeon/surgical team at [place] to seek advisement for care. Given Dr. Munoz phone number listed in care everywhere. Would be best to discuss, if concerns and seek for proper recommendations. Laura states that the patient is without pain, and recovering well. Incision healing nicely, however the (2) drain sites, which were probably chest tubes appeared \"black and slough\" the other day and now look better. They are covered by a Mepilex. Again encouraged to contact the [place] heart surgical team to obtain recommendations for care. Nurse will call to seek advisement. CMM,Rn   "

## 2021-06-25 NOTE — TELEPHONE ENCOUNTER
Spoke with patient and verbalized understanding. Pt's home health care nurse was out at house today and removed her bandages from her wounds and noticed she had some yellow oozing from the wounds. Home health is wondering if they can get a order to pack the wounds. [facility] [phone]

## 2021-06-25 NOTE — TELEPHONE ENCOUNTER
Contacted [facility] regarding home care orders to pack wound.  Spoke with Lashonda, who also consulted with patient's home care nurse from yesterday.  Per Lashonda they were not requesting orders to pack wound.  Writer did give clinic phone number should they decide something different.  Lashonda to call if they require anything further.    Jameson Black RN  [facility]

## 2021-06-25 NOTE — TELEPHONE ENCOUNTER
New Appointment Needed  What is the reason for the visit:    Inpatient/ED Follow Up Appt Request  At what hospital or facility were you seen?: [place]  What is the reason you were seen?: Open heart surgery  What date were you admitted?: date: 5/26  What date were you discharged?: date: 6/4  What was the recommended timeframe for your follow up appointment?: with in 1 week  Provider Preference: PCP only  How soon do you need to be seen?: within 1 week  Waitlist offered?: No  Okay to leave a detailed message:  Yes    inf [facility] 5/26 - 6/4 for open heart surgery

## 2021-06-25 NOTE — TELEPHONE ENCOUNTER
----- Message from Cassandra Blackwell sent at 6/11/2021  8:59 AM CDT -----  Regarding: JACQUIE Gomez, Occupational Therapist from [facility] calling to see if JASMINEReginald Arnold has had time to review his message and request 6/9 for occupational therapy and upper body ergometer,      [phone]     Thank you,   Cassandra     Called and left detailed message for Occ Therapist to contact [place] CV surgery team from recommendations post-op for exercise/activity allowance and if ok to proceed. Given office number for Dr. Munoz at [facility] [place], call back if further questions [phone]. MELINA,Rn

## 2021-06-25 NOTE — TELEPHONE ENCOUNTER
----- Message from Diana Coello CNP sent at 6/8/2021 10:35 AM CDT -----  Regarding: scheduled incorrectly  This appt is scheduled incorrectly. This is an INF 40 minute appt.please contact patient and reschedule

## 2021-06-25 NOTE — TELEPHONE ENCOUNTER
Called to patient to reschedule appointment on 6/10 as this needs to be scheduled as an INF (40 min) appointment and was scheduled as an office visit.    No answer and unable to leave a vm.    Please reschedule when patient calls back

## 2021-06-25 NOTE — TELEPHONE ENCOUNTER
----- Message from JANE Mckeon sent at 6/14/2021  8:20 AM CDT -----  Call patient: Her kidney function lab tests are within the range that they were in the hospital although her creatinine is on the high side of where they ran in the hospital.  Ideally we would like to see this creatinine number below 1.10 and her number was 1.23 which is likely due to being on the diuretic medication.  She can complete the additional 5 days that we discussed in the office but thereafter I would like her to discontinue this medication.

## 2021-06-25 NOTE — TELEPHONE ENCOUNTER
RN cannot approve Refill Request    RN can NOT refill this medication SIGs are dose limted.  Provider input needed for longer-term dosing. Last office visit: Visit date not found Last Physical: Visit date not found Last MTM visit: Visit date not found Last visit same specialty: 6/10/2021 Ely Torres FNP.  Next visit within 3 mo: Visit date not found  Next physical within 3 mo: Visit date not found      Douglas Rosado, [facility] Connection Triage/Med Refill 6/16/2021    Requested Prescriptions   Pending Prescriptions Disp Refills     furosemide (LASIX) 40 MG tablet [Pharmacy Med Name: FUROSEMIDE 40MG TABLETS] 10 tablet 0     Sig: TAKE 1 TABLET(40 MG) BY MOUTH TWICE DAILY FOR 5 DAYS       Diuretics/Combination Diuretics Refill Protocol  Passed - 6/16/2021  3:47 AM        Passed - Visit with PCP or prescribing provider visit in past 12 months     Last office visit with prescriber/PCP: Visit date not found OR same dept: 6/10/2021 Ely Torres FNP OR same specialty: 6/10/2021 Ely Torres FNP  Last physical: Visit date not found Last MTM visit: Visit date not found   Next visit within 3 mo: Visit date not found  Next physical within 3 mo: Visit date not found  Prescriber OR PCP: Augusto Molina MD  Last diagnosis associated with med order: 1. Hypertrophic cardiomyopathy (H)  - furosemide (LASIX) 40 MG tablet [Pharmacy Med Name: FUROSEMIDE 40MG TABLETS]; TAKE 1 TABLET(40 MG) BY MOUTH TWICE DAILY FOR 5 DAYS  Dispense: 10 tablet; Refill: 0  - potassium chloride (K-DUR,KLOR-CON) 20 MEQ tablet [Pharmacy Med Name: POTASSIUM CL 20MEQ ER TABLETS]; TAKE 2 TABLETS(40 MEQ) BY MOUTH TWICE DAILY FOR 5 DAYS  Dispense: 20 tablet; Refill: 0    If protocol passes may refill for 12 months if within 3 months of last provider visit (or a total of 15 months).             Passed - Serum Potassium in past 12 months      Lab Results   Component Value Date    Potassium 3.8 06/10/2021             Passed - Serum Sodium in  past 12 months      Lab Results   Component Value Date    Sodium 147 (H) 06/10/2021             Passed - Blood pressure on file in past 12 months     BP Readings from Last 1 Encounters:   06/10/21 122/60             Passed - Serum Creatinine in past 12 months      Creatinine   Date Value Ref Range Status   06/10/2021 1.23 (H) 0.60 - 1.10 mg/dL Final                potassium chloride (K-DUR,KLOR-CON) 20 MEQ tablet [Pharmacy Med Name: POTASSIUM CL 20MEQ ER TABLETS] 20 tablet 0     Sig: TAKE 2 TABLETS(40 MEQ) BY MOUTH TWICE DAILY FOR 5 DAYS       Potassium Supplements Refill Protocol Passed - 6/16/2021  3:47 AM        Passed - PCP or prescribing provider visit in past 12 months       Last office visit with prescriber/PCP: Visit date not found OR same dept: 6/10/2021 Ely Torres FNP OR same specialty: 6/10/2021 Ely Torres FNP  Last physical: Visit date not found Last MTM visit: Visit date not found   Next visit within 3 mo: Visit date not found  Next physical within 3 mo: Visit date not found  Prescriber OR PCP: Augusto Molina MD  Last diagnosis associated with med order: 1. Hypertrophic cardiomyopathy (H)  - furosemide (LASIX) 40 MG tablet [Pharmacy Med Name: FUROSEMIDE 40MG TABLETS]; TAKE 1 TABLET(40 MG) BY MOUTH TWICE DAILY FOR 5 DAYS  Dispense: 10 tablet; Refill: 0  - potassium chloride (K-DUR,KLOR-CON) 20 MEQ tablet [Pharmacy Med Name: POTASSIUM CL 20MEQ ER TABLETS]; TAKE 2 TABLETS(40 MEQ) BY MOUTH TWICE DAILY FOR 5 DAYS  Dispense: 20 tablet; Refill: 0    If protocol passes may refill for 12 months if within 3 months of last provider visit (or a total of 15 months).             Passed - Potassium level in last 12 months     Lab Results   Component Value Date    Potassium 3.8 06/10/2021

## 2021-06-25 NOTE — TELEPHONE ENCOUNTER
Refill Request  Did you contact pharmacy: Yes  Medication name: irbesartan (AVAPRO) 300 MG tablet  Requested Prescriptions      No prescriptions requested or ordered in this encounter     Who prescribed the medication: Dr. Kurt Wasserman  Pharmacy Name and Location:  [facility]  Is patient out of medication: No.  4 days left  Patient notified refills processed in 72 hours:  yes  Okay to leave a detailed message: no

## 2021-06-26 NOTE — PROGRESS NOTES
Internal Medicine Office Visit  [facility]   Patient Name: Romy Perez  Patient Age: 70 y.o.  YOB: 1950  MRN: [id number]    Date of Visit: 6/10/2021  Reason for Office Visit:   Chief Complaint   Patient presents with     Hospital Visit Follow Up           Assessment / Plan / Medical Decision Making:    Problem List Items Addressed This Visit     Hypertrophic cardiomyopathy (H)    Relevant Medications    furosemide (LASIX) 40 MG tablet    amiodarone (PACERONE) 200 MG tablet    potassium chloride (K-DUR,KLOR-CON) 20 MEQ tablet    Other Relevant Orders    HM2(CBC w/o Differential) (Completed)    Basic Metabolic Panel (Completed)      Other Visit Diagnoses     Hospital discharge follow-up    -  Primary    PAF (paroxysmal atrial fibrillation) (H)        Relevant Medications    furosemide (LASIX) 40 MG tablet    apixaban ANTICOAGULANT (ELIQUIS) 5 mg Tab tablet    amiodarone (PACERONE) 200 MG tablet    Leukocytosis, unspecified type             - Inpatient records reviewed through [facility]  - Repeat CBC to follow up on leukocytosis. She remains asymptomatic- no fevers, chills  - Continue furosemide for an additional 5 days pending BMP recheck. Will notify patient if she should not proceed with this plan. Discontinue furosemide after completion of the available pills   - Continue deep breathing exercises  - Follow up with cardiology as scheduled   - Follow up with PCP in 3-4 weeks for 6 minute walk to determine need for oxygen. This was deferred today given breathlessness and resting oxygen saturation of 91% today       I have discontinued Romy Perez's irbesartan and potassium chloride. I have also changed her furosemide. Additionally, I am having her start on potassium chloride. Lastly, I am having her maintain her calcium-vitamin D, cycloSPORINE, turmeric root extract, NON FORMULARY, NON FORMULARY, sodium chloride, hydrocortisone, NON FORMULARY,  cetaphil, NON FORMULARY, SUMAtriptan, metoprolol succinate, apixaban ANTICOAGULANT, and amiodarone.            42 minutes spent on the date of the encounter doing chart review, review of outside records, review of test results, patient visit and documentation     Orders Placed This Encounter   Procedures     HM2(CBC w/o Differential)     Basic Metabolic Panel   Followup: Return in about 4 weeks (around 7/8/2021) for Recheck with Diana- test for oxygen . earlier if needed.        Ely Torres, BREE        HPI:  Romy Perez is a 70 y.o. 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  During her hospitalization she had paroxysmal postoperative atrial fibrillation in and out throughout her hospitalization.  She required oxygen during her hospital stay and was unable to be weaned off prior to discharge.  She also had leukocytosis.  Blood cultures, UA, chest x-ray were unremarkable.  She was asymptomatic. She is working hard on the incentive spirometer use. Her breathing has been poor in the humidity of the past week. She is doing exercises inside in air conditioning with OT. Her oxygen levels have been mostly around 95%    She has had LE edema. Her weight has been declining with gradually improved edema. She asks about continuing lasix for another few days.       Health Maintenance Review  Health Maintenance   Topic Date Due     ADVANCE CARE PLANNING  08/10/2021     Pneumococcal Vaccine: 65+ Years (1 of 1 - PPSV23) 06/26/2021 (Originally 7/19/2017)     ZOSTER VACCINES (1 of 2) 06/26/2021 (Originally 7/19/2002)     COLORECTAL CANCER SCREENING   06/26/2021 (Originally 1950)     MEDICARE ANNUAL WELLNESS VISIT  07/29/2021     FALL RISK ASSESSMENT  07/29/2021     INFLUENZA VACCINE RULE BASED (Season Ended) 08/01/2021     MAMMOGRAM  08/06/2022     LIPID  07/29/2025     TD 18+ HE  07/29/2030     DEXA SCAN  09/03/2035     COVID-19 Vaccine  Completed     Pneumococcal Vaccine: Pediatrics (0 to 5 Years) and At-Risk Patients (6 to 64 Years)  Aged Out     HEPATITIS B VACCINES  Aged Out     HEPATITIS C SCREENING  Discontinued       Current Scheduled Meds:  Outpatient Encounter Medications as of 6/10/2021   Medication Sig Dispense Refill     amiodarone (PACERONE) 200 MG tablet Take 200 mg by mouth daily.       apixaban ANTICOAGULANT (ELIQUIS) 5 mg Tab tablet Take 1 tablet (5 mg total) by mouth 2 (two) times a day. 180 tablet 0     calcium-vitamin D (CALCIUM-VITAMIN D) 500 mg(1,250mg) -200 unit per tablet Take 2 tablets by mouth daily.       cycloSPORINE (RESTASIS) 0.05 % ophthalmic emulsion Administer 1 drop to both eyes 2 (two) times a day.       furosemide (LASIX) 40 MG tablet Take 1 tablet (40 mg total) by mouth 2 (two) times a day for 5 days. 10 tablet 0     hydrocortisone 1 % cream Apply topically as needed.        metoprolol succinate (TOPROL-XL) 50 MG 24 hr tablet TAKE 1 TABLET(50 MG) BY MOUTH TWICE DAILY 180 tablet 0     NON FORMULARY Take 1 capsule by mouth daily. Nutridyrr-stress essentials balance        NON FORMULARY Take 1 packet by mouth daily. Immunocal  Platinum       NON FORMULARY Take 1 capsule by mouth daily. Magnesium Lactate       NON FORMULARY 3 capsules daily. DE 3 omega       sodium chloride (OCEAN) 0.65 % nasal spray Apply 1 spray into each nostril as needed for congestion.       SUMAtriptan (IMITREX) 50 MG tablet Take 1 tablet (50 mg total) by mouth every 2 (two) hours as needed for migraine. 12 tablet 0     turmeric root extract 500 mg cap Take 1 tablet by mouth daily.       vit E-glycerin-dimethicone (CETAPHIL) Lotn Apply topically.  cetaphil lotion       [DISCONTINUED] apixaban ANTICOAGULANT (ELIQUIS) 5 mg Tab tablet Take 1 tablet (5 mg total) by mouth 2 (two) times a day. 60 tablet 11     [DISCONTINUED] furosemide (LASIX) 40 MG tablet Take 40 mg by mouth 2 (two) times a day.       [DISCONTINUED] irbesartan (AVAPRO) 300 MG tablet Take 0.5 tablets (150 mg total) by mouth daily. 45 tablet 0     [DISCONTINUED] metoprolol succinate (TOPROL-XL) 100 MG 24 hr tablet Take 1 tablet (100 mg total) by mouth 2 (two) times a day. 180 tablet 1     [DISCONTINUED] potassium chloride (KLOR-CON) 20 mEq packet Take 20 mEq by mouth 2 (two) times a day.       potassium chloride (K-DUR,KLOR-CON) 20 MEQ tablet Take 2 tablets (40 mEq total) by mouth 2 (two) times a day for 5 days. 20 tablet 0     No facility-administered encounter medications on file as of 6/10/2021.      Post Discharge Medication Reconciliation Status: discharge medications reconciled and changed, per note/orders      Objective / Physical Examination:  Vitals:    06/10/21 1453   BP: 122/60   Pulse: 62   Temp: 98.3  F (36.8  C)   TempSrc: Oral   SpO2: 91%   Weight: 192 lb (87.1 kg)     Wt Readings from Last 3 Encounters:   06/10/21 192 lb (87.1 kg)   03/26/21 192 lb (87.1 kg)   02/05/21 195 lb 8 oz (88.7 kg)     Wt Readings from Last 20 Encounters:   06/10/21 192 lb (87.1 kg)   03/26/21 192 lb (87.1 kg)   02/05/21 195 lb 8 oz (88.7 kg)   01/29/21 189 lb 12.8 oz (86.1 kg)   01/22/21 193 lb (87.5 kg)   01/15/21 194 lb (88 kg)   01/07/21 193 lb (87.5 kg)   12/14/20 193 lb (87.5 kg)   10/28/20 193 lb (87.5 kg)   09/11/20 196 lb (88.9 kg)   08/20/20 195 lb (88.5 kg)   07/29/20 195 lb (88.5 kg)   06/24/20 198 lb (89.8 kg)   05/27/20 198 lb (89.8 kg)   06/19/19 195 lb (88.5 kg)   05/31/19 195 lb (88.5 kg)   02/03/17 190 lb (86.2 kg)   08/10/16 188 lb (85.3 kg)         Body mass index is 34.01 kg/m .     Constitutional: In no apparent distress. Sometimes speaks in partial sentences due to breathlessness    Eyes: Non-icteric.   Respiratory: Clear to auscultation bilaterally but bases are diminished bilaterally  Cardiovascular: Regular rate and rhythm. +systolic murmur. No rubs or gallops  Gastrointestinal: Bowel sounds active all four quadrants. Soft, non-tender.   Skin: feet with 2+ pitting edema, no edema of pretibial area due to compression wraps in place. Midline chest incision is intact and healing well. No surrounding crepitus with palpation.   Psych: Alert and oriented x3.

## 2021-06-26 NOTE — TELEPHONE ENCOUNTER
Last Office Visit  6/10/2021 Ely Torres FNP  Notes:  Problem List Items Addressed This Visit           Hypertrophic cardiomyopathy (H)      Relevant Medications      furosemide (LASIX) 40 MG tablet      amiodarone (PACERONE) 200 MG tablet      potassium chloride (K-DUR,KLOR-CON) 20 MEQ tablet      Other Relevant Orders      HM2(CBC w/o Differential) (Completed)      Basic Metabolic Panel (Completed)                Other Visit Diagnoses      Hospital discharge follow-up    -  Primary     PAF (paroxysmal atrial fibrillation) (H)         Relevant Medications     furosemide (LASIX) 40 MG tablet     apixaban ANTICOAGULANT (ELIQUIS) 5 mg Tab tablet     amiodarone (PACERONE) 200 MG tablet     Leukocytosis, unspecified type             Last Filled:  metoprolol succinate (TOPROL-XL) 50 MG 24 hr tablet 180 tablet 0 4/19/2021  No   Sig: TAKE 1 TABLET(50 MG) BY MOUTH TWICE DAILY   Sent to pharmacy as: metoprolol succinate ER 50 mg tablet,extended release 24 hr (TOPROL-XL)   E-Prescribing Status: Receipt confirmed by pharmacy (4/19/2021  3:34 PM CDT)       Next OV:  7/9/2021 Diana Coello, CNP        Medication teed up for provider signature

## 2021-06-26 NOTE — TELEPHONE ENCOUNTER
Mable assisted in calling patient to get her scheduled. She said that she was busy today with home care and scheduled for Monday the 28th. Advised her that if her symptoms get worse she needs to be seen in WIC/Urgent care

## 2021-06-26 NOTE — TELEPHONE ENCOUNTER
Refill Request  Did you contact pharmacy: No  Medication name:   Metoprolol 50 mg    Who prescribed the medication: PCP  Requested Pharmacy: [facility] [place] patient out of medication: No.  7 days left  Patient notified refills processed in 3 business days:  yes  Okay to leave a detailed message: yes

## 2021-06-26 NOTE — TELEPHONE ENCOUNTER
Ely Sanchez calling from [facility] calling to let you know that she has gained 4 pounds since Tuesday.  She was 179 on Tuesday and now she is 183.  Patient is saying the her belly feels full.  She is no longer taking lasix.  Her last pill of lasix was on Sunday and this was stopped due to her kidney function.  Please call Laura back at [phone]

## 2021-06-29 ENCOUNTER — COMMUNICATION - HEALTHEAST (OUTPATIENT)
Dept: INTERNAL MEDICINE | Facility: CLINIC | Age: 71
End: 2021-06-29

## 2021-06-29 DIAGNOSIS — I42.2 HYPERTROPHIC CARDIOMYOPATHY (H): ICD-10-CM

## 2021-06-29 NOTE — PROGRESS NOTES
Progress Notes by Hossein Arnold MD at 10/28/2020 10:30 AM     Author: Hossein Arnold MD Service: -- Author Type: Physician    Filed: 10/28/2020 11:09 AM Encounter Date: 10/28/2020 Status: Signed    : Hossein Arnold MD (Physician)           Thank you, Diana Mahoney, [facility], for asking the [facility] Heart Care team to see Ms. Romy Perez to evaluate Follow-up.      Assessment/Recommendations   Assessment:    1. Hypertrophic cardiomyopathy with dynamic LVOT gradient - new diagnosis. some exertional limitations with shortness of breath that may be due to her elevated hemidiaphragm.  2. Hypertension - BP generally controlled but heart rate is faster than I would like to see it with her LVOT gradient.    Plan:  1. Exercise treadmill stress test. Do NOT hold b-blocker for test. Looking to observe blood pressure response to exercise and maximal exercise tolerance.  2. 30 day NAIF monitor to look for evidence of ventricular arrhythmia.  3. Referral placed to genetic counselor to discuss genetic testing for HCM.  4. Recommended she inform her first degree relatives (daughter) to obtain an echocardiogram to screen for HCM.  5. Follow up in 6 months or sooner if needed.         History of Present Illness   Ms. Romy Perez is a 70 y.o. female with a significant past history of hypertension who presents for evaluation of an abnormal echocardiogram.     has hypertrophic obstructive cardiomyopathy with a prior echo performed on 8/20/2020 that demonstrated progression of the LVOT gradient, peaking at 100 mmHg, which is increased from 40 mmHg a year ago. This does not result in significant mitral valve regurgitation but asymmetric septal hypertrophy was noted. 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 A cardiac MRI confirmed the diagnosis of HOCM.     She continues to have some shortness of  breath with bending over and with more vigorous activity. She is able to tolerate walking and light/moderate exercise, such as raking leaves for 90 minutes.      Other than noted above, Ms. Perez denies any chest pain/pressure/tightness, shortness of breath at rest or with exertion, light headedness/dizziness, pre-syncope, syncope, lower extremity swelling, palpitations, paroxysmal nocturnal dyspnea (PND), or orthopnea.     Cardiac Problems and Cardiac Diagnostics     Most Recent Cardiac testing:    Cardiac MRI 9/21/2020  IMPRESSIONS:    1.  Normal left ventricular size, thickened septum with maximal measurement 18-20 mm, inferolateral wall 7-8 mm. The quantified left ventricular ejection fraction is 69%. It appears less deformation of thickened septum per tagging images. There is no rest perfusion defect. It is evident that there is LVOT obstruction by flow turbulence and EMELYN.  The late delayed gadolinium enhancement study demonstrated that there are patchy gadolinium enhancement in thickened basal septal segment and also in basal lateral segment. Put all together, the findings are consistent with hypertrophic cardiomyopathy.   2.  Normal right ventricular size function.    3.  Mildly enlarged both atria.  4.  Moderate mitral valve regurgitation.  5.  Mildly dilated mid ascending aorta 40 mm.    ECHO (report reviewed):   Echo results:   Results for orders placed during the hospital encounter of 08/20/20   Echo Complete [ECH10] 08/20/2020    Addendum   1.Left ventricle ejection fraction is normal. The calculated left  ventricular ejection fraction is 65%.     2.Moderate concentric left ventricle hypertrophy with moderate to severe  left ventricular outflow tract gradient due to this as well as associated  systolic anterior motion of mitral valve and chordal apparatus. Estimated  left ventricular for tract gradient 100 mmHg.     3.TAPSE is normal, which is consistent with normal right ventricular  systolic  function.     4.The ascending aorta is mildly dilated.     5.No hemodynamically significant valvular heart abnormalities.     6.When compared to the previous study dated 6/19/2019, no significant  change. Prior echo read mild to moderate aortic stenosis, doubt true  stenosis but increased left ventricular outflow tract gradient secondary  to systolic anterior motion as well as moderate left ventricular  hypertrophy, should consider hypertrophic cardiomyopathy in differential.  Suggest cardiac MRI. Left ventricular outflow tract gradient was felt to  be similar on review of prior study.          Milena Villanueva MD 8/20/2020 11:50 AM                  Medications  Allergies   Current Outpatient Medications   Medication Sig Dispense Refill   ? calcium-vitamin D (CALCIUM-VITAMIN D) 500 mg(1,250mg) -200 unit per tablet Take 2 tablets by mouth daily.     ? cycloSPORINE (RESTASIS) 0.05 % ophthalmic emulsion Administer 1 drop to both eyes 2 (two) times a day.     ? hydrocortisone 1 % cream Apply topically 2 (two) times a day.     ? irbesartan (AVAPRO) 300 MG tablet Take 0.5 tablets (150 mg total) by mouth daily. 45 tablet 0   ? metoprolol succinate (TOPROL-XL) 50 MG 24 hr tablet Take 1 tablet (50 mg total) by mouth 2 (two) times a day. 180 tablet 2   ? NON FORMULARY 1 capsule daily. Candibactrin     ? NON FORMULARY Take 2 capsules by mouth daily. Nutridyrr-stress essentials balance      ? NON FORMULARY Take 1 packet by mouth daily. imminocal Platinum     ? NON FORMULARY Take 1 capsule by mouth daily. Magnesium Lactate     ? omega 3-dha-epa-fish oil 100-160-1,000 mg cap 2 capsules in the morning and 1 capsule at night     ? sodium chloride (OCEAN) 0.65 % nasal spray Apply 1 spray into each nostril as needed for congestion.     ? SUMAtriptan (IMITREX) 50 MG tablet Take 1 tablet (50 mg total) by mouth every 2 (two) hours as needed for migraine. 10 tablet 3   ? turmeric root extract 500 mg cap Take 1 tablet by mouth daily.      ? NON FORMULARY Take 1 capsule by mouth daily. [facility]       No current facility-administered medications for this visit.       Allergies   Allergen Reactions   ? Shellfish Containing Products Shortness Of Breath   ? Acetaminophen         Physical Examination Review of Systems   Vitals:    10/28/20 1027   BP: 130/78   Pulse: 64   Resp: 14     Body mass index is 34.46 kg/m .  Wt Readings from Last 3 Encounters:   10/28/20 193 lb (87.5 kg)   09/11/20 196 lb (88.9 kg)   08/20/20 195 lb (88.5 kg)       General Appearance:   Pleasant  female, appears stated age. no acute distress, overweight body habitus   ENT/Mouth: Wearing a mask    EYES:  no scleral icterus, normal conjunctivae   Neck: supple   Respiratory:   lungs are clear to auscultation, no rales or wheezing, equal chest wall expansion    Cardiovascular:   Regular rhythm, normal rate. Normal first and second heart sounds with 4/6 sytolic murmur at RUSB. no rubs or gallops; the carotid, radial and posterior tibial pulses are intact, Jugular venous pressure normal, no edema bilaterally    Abdomen/GI:  Soft, non-tender   Extremities: no cyanosis or clubbing   Skin: no xanthelasma, warm.    Heme/lymph/ Immunology No apparent bleeding noted.   Neurologic: Alert and oriented. normal gait, no tremors   Psychiatric: Pleasant, calm, appropriate affect.    A complete 10 system review of systems was performed and is negative except as mentioned in the HPI or below:  General: WNL  Eyes: WNL  Ears/Nose/Throat: WNL  Lungs: WNL  Heart: WNL  Stomach: WNL  Bladder: WNL  Muscle/Joints: WNL  Skin: WNL  Nervous System: WNL  Mental Health: WNL     Blood: WNL       Past History   Past Medical History:   Patient Active Problem List    Diagnosis Date Noted   ? Migraine Headache    ? 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 Essential Hypertension    ? Aortic Stenosis        Past Surgical History:   Past Surgical History:   Procedure Laterality Date   ? NV EXCIS TENDON SHEATH LESION,  HAND/FINGER      Description: Hand Excision Of A Tendon Cyst;  Recorded: 03/07/2012;   ? MA EXCISE BREAST CYST      Description: Breast Surgery Lumpectomy;  Recorded: 03/07/2012;  Comments: adenoma   ? MA EXCISION NOSE POLYP(S),EXTENSIVE      Description: Extensive Excision Of Nasal Polyps;  Recorded: 03/07/2012;   ? MA KNEE SCOPE,DIAGNOSTIC      Description: Arthroscopy Knee Left;  Recorded: 03/07/2012;   ? MA REMOVAL OF OVARY(S)      Description: Oophorectomy;  Recorded: 06/26/2013;   ? US GUIDED NEEDLE PLACEMENT  7/20/2020       Family History:   Family History   Problem Relation Age of Onset   ? Pacemaker Father    ? Sudden death Neg Hx     cousin possibly with HCM.    Social History:   Social History     Socioeconomic History   ? Marital status:      Spouse name: Not on file   ? Number of children: Not on file   ? Years of education: Not on file   ? Highest education level: Not on file   Occupational History   ? Not on file   Social Needs   ? Financial resource strain: Not on file   ? Food insecurity     Worry: Not on file     Inability: Not on file   ? Transportation needs     Medical: Not on file     Non-medical: Not on file   Tobacco Use   ? Smoking status: Never Smoker   ? Smokeless tobacco: Never Used   Substance and Sexual Activity   ? Alcohol use: Not on file   ? Drug use: Not on file   ? Sexual activity: Not on file   Lifestyle   ? Physical activity     Days per week: Not on file     Minutes per session: Not on file   ? Stress: Not on file   Relationships   ? Social connections     Talks on phone: Not on file     Gets together: Not on file     Attends Hinduism service: Not on file     Active member of club or organization: Not on file     Attends meetings of clubs or organizations: Not on file     Relationship status: Not on file   ? Intimate partner violence     Fear of current or ex partner: Not on file     Emotionally abused: Not on file     Physically abused: Not on file     Forced sexual  activity: Not on file   Other Topics Concern   ? Not on file   Social History Narrative   ? Not on file              Lab Results    Chemistry/lipid CBC Cardiac Enzymes/BNP/TSH/INR   Lab Results   Component Value Date    CHOL 225 (H) 07/29/2020    HDL 64 07/29/2020    LDLCALC 139 (H) 07/29/2020    TRIG 109 07/29/2020    CREATININE 0.74 07/29/2020    BUN 20 07/29/2020    K 4.0 07/29/2020     07/29/2020     07/29/2020    CO2 25 07/29/2020    Lab Results   Component Value Date    WBC 7.3 05/27/2020    HGB 14.4 05/27/2020    HCT 45.3 05/27/2020    MCV 89 05/27/2020     05/27/2020    Lab Results   Component Value Date    TSH 2.48 05/27/2020

## 2021-06-30 ENCOUNTER — HOSPITAL ENCOUNTER (OUTPATIENT)
Dept: RADIOLOGY | Facility: HOSPITAL | Age: 71
Discharge: HOME | End: 2021-06-30
Attending: INTERNAL MEDICINE
Payer: MEDICARE

## 2021-06-30 DIAGNOSIS — I31.39 PERICARDIAL EFFUSION: ICD-10-CM

## 2021-06-30 DIAGNOSIS — I50.31 ACUTE DIASTOLIC CONGESTIVE HEART FAILURE (H): ICD-10-CM

## 2021-06-30 NOTE — PROGRESS NOTES
Progress Notes by Hossein Arnold MD at 12/14/2020 11:10 AM     Author: Hossein Arnold MD Service: -- Author Type: Physician    Filed: 12/14/2020  1:12 PM Encounter Date: 12/14/2020 Status: Signed    : Hossein Arnold MD (Physician)           Thank you, Diana Mahoney, [facility], for asking the [facility] Heart Care team to see Ms. Romy Perez to evaluate Follow-up.      Assessment/Recommendations   Assessment:    1. Hypertrophic cardiomyopathy with dynamic LVOT gradient - new diagnosis. some exertional limitations with shortness of breath that may be due to her elevated hemidiaphragm.  2. Hypertension - stable.  3. Paroxysmal atrial fibrillation - with RVR - symptomatic. This causes a problem with her slow resting heart rate and LVOT gradient. Will need to aggressively treat rhythm.    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 May also need to consider septal myectomy at some point.         History of Present Illness   Ms. Romy Perez is a 70 y.o. female with a significant past history of hypertension who presents for evaluation of an abnormal echocardiogram.     has hypertrophic obstructive cardiomyopathy with a prior echo performed on 8/20/2020 that demonstrated progression of the LVOT gradient, peaking at 100 mmHg, which is increased from 40 mmHg a year ago. This does not result in significant mitral valve regurgitation but asymmetric septal hypertrophy was noted. 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 A cardiac MRI confirmed the diagnosis of HOCM.     Since I last saw her she has been wearing a cardiac rhythm monitor.  She developed 5 minutes of tachypalpitations  "described as \"a pinball machine in my chest\".  Symptoms resolved some spontaneously.  They correlated with an event of atrial fibrillation with RVR.  Her resting heart rate is slower around 45 to 50 bpm at this time.  She still has some dyspnea with moderate exertion.  She can perform most of the activities that she needs to do on a daily daily basis without being limited by her shortness of breath.  She does note that carrying a full little laundry up the stairs does cause her to stop and rest at the top.    Other than noted above, Ms. Perez denies any chest pain/pressure/tightness, shortness of breath at rest or with exertion, light headedness/dizziness, pre-syncope, syncope, lower extremity swelling, palpitations, paroxysmal nocturnal dyspnea (PND), or orthopnea.     Cardiac Problems and Cardiac Diagnostics     Most Recent Cardiac testing:    Cardiac MRI 9/21/2020  IMPRESSIONS:    1.  Normal left ventricular size, thickened septum with maximal measurement 18-20 mm, inferolateral wall 7-8 mm. The quantified left ventricular ejection fraction is 69%. It appears less deformation of thickened septum per tagging images. There is no rest perfusion defect. It is evident that there is LVOT obstruction by flow turbulence and EMELYN.  The late delayed gadolinium enhancement study demonstrated that there are patchy gadolinium enhancement in thickened basal septal segment and also in basal lateral segment. Put all together, the findings are consistent with hypertrophic cardiomyopathy.   2.  Normal right ventricular size function.    3.  Mildly enlarged both atria.  4.  Moderate mitral valve regurgitation.  5.  Mildly dilated mid ascending aorta 40 mm.    ECHO (report reviewed):   Echo results:   Results for orders placed during the hospital encounter of 08/20/20   Echo Complete [ECH10] 08/20/2020    Addendum   1.Left ventricle ejection fraction is normal. The calculated left  ventricular ejection fraction is 65%.     " 2.Moderate concentric left ventricle hypertrophy with moderate to severe  left ventricular outflow tract gradient due to this as well as associated  systolic anterior motion of mitral valve and chordal apparatus. Estimated  left ventricular for tract gradient 100 mmHg.     3.TAPSE is normal, which is consistent with normal right ventricular  systolic function.     4.The ascending aorta is mildly dilated.     5.No hemodynamically significant valvular heart abnormalities.     6.When compared to the previous study dated 6/19/2019, no significant  change. Prior echo read mild to moderate aortic stenosis, doubt true  stenosis but increased left ventricular outflow tract gradient secondary  to systolic anterior motion as well as moderate left ventricular  hypertrophy, should consider hypertrophic cardiomyopathy in differential.  Suggest cardiac MRI. Left ventricular outflow tract gradient was felt to  be similar on review of prior study.          Milena Villanueva MD 8/20/2020 11:50 AM                  Medications  Allergies   Current Outpatient Medications   Medication Sig Dispense Refill   ? calcium-vitamin D (CALCIUM-VITAMIN D) 500 mg(1,250mg) -200 unit per tablet Take 2 tablets by mouth daily.     ? cycloSPORINE (RESTASIS) 0.05 % ophthalmic emulsion Administer 1 drop to both eyes 2 (two) times a day.     ? diltiazem (CARDIZEM CD) 120 MG 24 hr capsule Take 1 capsule (120 mg total) by mouth daily. (Patient taking differently: Take 120 mg by mouth daily. Hasn't started yet) 90 capsule 3   ? hydrocortisone 1 % cream Apply topically 2 (two) times a day.     ? irbesartan (AVAPRO) 300 MG tablet Take 0.5 tablets (150 mg total) by mouth daily. 45 tablet 0   ? metoprolol succinate (TOPROL-XL) 100 MG 24 hr tablet Take 1 tablet (100 mg total) by mouth 2 (two) times a day. 180 tablet 3   ? NON FORMULARY 1 capsule daily. Candibactrin     ? NON FORMULARY Take 2 capsules by mouth daily. Nutridyrr-stress essentials balance      ? NON  FORMULARY Take 1 packet by mouth daily. imminocal Platinum     ? NON FORMULARY Take 1 capsule by mouth daily. Magnesium Lactate     ? omega 3-dha-epa-fish oil 100-160-1,000 mg cap 2 capsules in the morning and 1 capsule at night     ? sodium chloride (OCEAN) 0.65 % nasal spray Apply 1 spray into each nostril as needed for congestion.     ? SUMAtriptan (IMITREX) 50 MG tablet Take 1 tablet (50 mg total) by mouth every 2 (two) hours as needed for migraine. 10 tablet 3   ? turmeric root extract 500 mg cap Take 1 tablet by mouth daily.     ? NON FORMULARY Take 1 capsule by mouth daily. [facility]       No current facility-administered medications for this visit.       Allergies   Allergen Reactions   ? Shellfish Containing Products Shortness Of Breath   ? Acetaminophen         Physical Examination Review of Systems   Vitals:    12/14/20 1139   BP: 126/74   Pulse: (!) 48   Resp: 16   SpO2: 94%     Body mass index is 34.46 kg/m .  Wt Readings from Last 3 Encounters:   12/14/20 193 lb (87.5 kg)   10/28/20 193 lb (87.5 kg)   09/11/20 196 lb (88.9 kg)       General Appearance:   Pleasant  female, appears stated age. no acute distress, overweight body habitus   ENT/Mouth: Wearing a mask    EYES:  no scleral icterus, normal conjunctivae   Neck: supple   Respiratory:   lungs are clear to auscultation, no rales or wheezing, equal chest wall expansion    Cardiovascular:   Regular rhythm, normal rate. Normal first and second heart sounds with 3/6 sytolic murmur at RUSB. no rubs or gallops; the carotid, radial and posterior tibial pulses are intact, Jugular venous pressure normal, no edema bilaterally    Abdomen/GI:  Soft, non-tender   Extremities: no cyanosis or clubbing   Skin: no xanthelasma, warm.    Heme/lymph/ Immunology No apparent bleeding noted.   Neurologic: Alert and oriented. normal gait, no tremors   Psychiatric: Pleasant, calm, appropriate affect.    A complete 10 system review of systems was performed and is negative  except as mentioned in the HPI or below:  General: WNL  Eyes: WNL  Ears/Nose/Throat: WNL  Lungs: WNL  Heart: Shortness of Breath with activity, Irregular Heartbeat  Stomach: WNL  Bladder: WNL  Muscle/Joints: WNL  Skin: Rash  Nervous System: WNL  Mental Health: WNL     Blood: WNL       Past History   Past Medical History:   Patient Active Problem List    Diagnosis Date Noted   ? Migraine Headache    ? 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 Essential Hypertension    ? Aortic Stenosis        Past Surgical History:   Past Surgical History:   Procedure Laterality Date   ? IN EXCIS TENDON SHEATH LESION, HAND/FINGER      Description: Hand Excision Of A Tendon Cyst;  Recorded: 03/07/2012;   ? IN EXCISE BREAST CYST      Description: Breast Surgery Lumpectomy;  Recorded: 03/07/2012;  Comments: adenoma   ? IN EXCISION NOSE POLYP(S),EXTENSIVE      Description: Extensive Excision Of Nasal Polyps;  Recorded: 03/07/2012;   ? IN KNEE SCOPE,DIAGNOSTIC      Description: Arthroscopy Knee Left;  Recorded: 03/07/2012;   ? IN REMOVAL OF OVARY(S)      Description: Oophorectomy;  Recorded: 06/26/2013;   ? US GUIDED NEEDLE PLACEMENT  7/20/2020       Family History:   Family History   Problem Relation Age of Onset   ? Pacemaker Father    ? Sudden death Neg Hx     cousin possibly with HCM.    Social History:   Social History     Socioeconomic History   ? Marital status:      Spouse name: Not on file   ? Number of children: Not on file   ? Years of education: Not on file   ? Highest education level: Not on file   Occupational History   ? Not on file   Social Needs   ? Financial resource strain: Not on file   ? Food insecurity     Worry: Not on file     Inability: Not on file   ? Transportation needs     Medical: Not on file     Non-medical: Not on file   Tobacco Use   ? Smoking status: Never Smoker   ? Smokeless tobacco: Never Used   Substance and Sexual Activity   ? Alcohol use: Not on file   ? Drug use: Not on file   ? Sexual activity: Not on  file   Lifestyle   ? Physical activity     Days per week: Not on file     Minutes per session: Not on file   ? Stress: Not on file   Relationships   ? Social connections     Talks on phone: Not on file     Gets together: Not on file     Attends Bahai service: Not on file     Active member of club or organization: Not on file     Attends meetings of clubs or organizations: Not on file     Relationship status: Not on file   ? Intimate partner violence     Fear of current or ex partner: Not on file     Emotionally abused: Not on file     Physically abused: Not on file     Forced sexual activity: Not on file   Other Topics Concern   ? Not on file   Social History Narrative   ? Not on file              Lab Results    Chemistry/lipid CBC Cardiac Enzymes/BNP/TSH/INR   Lab Results   Component Value Date    CHOL 225 (H) 07/29/2020    HDL 64 07/29/2020    LDLCALC 139 (H) 07/29/2020    TRIG 109 07/29/2020    CREATININE 0.74 07/29/2020    BUN 20 07/29/2020    K 4.0 07/29/2020     07/29/2020     07/29/2020    CO2 25 07/29/2020    Lab Results   Component Value Date    WBC 7.3 05/27/2020    HGB 14.4 05/27/2020    HCT 45.3 05/27/2020    MCV 89 05/27/2020     05/27/2020    Lab Results   Component Value Date    TSH 2.48 05/27/2020

## 2021-06-30 NOTE — PROGRESS NOTES
Progress Notes by Paul Garza MD at 1/15/2021 12:50 PM     Author: Paul Garza MD Service: -- Author Type: Physician    Filed: 1/15/2021  6:56 PM Encounter Date: 1/15/2021 Status: Signed    : Paul Garza MD (Physician)         Thank you, Dr. Coello, for asking the [facility] team to see Ms. Romy Perez to evaluate atrial fibrillation in the setting of hypertrophic cardiomyopathy.      Assessment/Recommendations   Assessment:      Paroxysmal atrial fibrillation: Formally diagnosed in November 2020 when the patient was wearing a NAIF and had a total burden of 5 hours over the course of a 30-day monitor.  The patient has been started on oral anticoagulant therapy which is appropriate given her elevated chads vascular score of 3.  The patient is on beta-blocker therapy and I would not pursue antiarrhythmic drug therapy at present as we better understand her overall arrhythmia burden.  Long-term she is likely to have increasing recurrence of her arrhythmia due to her underlying hypertrophic cardiomyopathy (see below).  The patient is demonstrated some sinus bradycardia at night, but no symptomatic events or significant pauses.    Hypertrophic cardiomyopathy: The patient has confirmation of this condition on both MRI and echo studies.  The patient does not fulfill any major criteria for indications for primary prevention ICD.  She has no family history of sudden cardiac death (cousins with disease have a direct correlation to a nonrelated uncle).  Genetic testing is underway however indication for ICD would depend on identifying a particular subtype defect.  The patient does have significant outflow tract obstruction and this would be consistent with her clinical symptoms.  She will be meeting with Hossein Arnold MD to discuss further treatment options.    Essential hypertension: Clinically stable problem on her current blood pressure medications.      Plan:    No change  "in her current medications.    I discussed the role of a implantable loop recorder with the patient as a means to monitor both atrial fibrillation burden and look for evidence of silent nonsustained VT.  The patient be contacted next week to determine if she would like to move forward with ILR implant.    Follow-up with me in 3 months (device clinic if ILR implanted).       History of Present Illness/Subjective    Ms. Romy Perez is a 70 y.o. female with initial symptoms of dyspnea on exertion which significantly worsened in the winter 2019.  The patient has been undergoing cardiac work-up for hypertrophic cardiomyopathy and wore a 30-day patient activated monitor.  The patient did not have any significant ventricular arrhythmia but did have symptomatic paroxysmal atrial fibrillation with a total of 5-hour burden during the course of the 30-day recording.  Patient describes her symptoms as feeling like she \"is in a pinball machine\".  The patient did not report any significant lightheadedness presyncope or syncope.  She notes no exertional chest pain, orthopnea, PND, or ankle edema.    Multiday patient activated monitor: November 2020  INTERPRETATION:  Romy Perez had a patient activated ECG monitor to  evaluate ventricular tachycardia between 11/18 and 12/15/2020.  Baseline  transmission on 11/16/2020 showed sinus bradycardia, rate 54 beats per minute with  first-degree AV block.  Patient reported symptoms of heart racing on 12/10 at time  1658, associated with what appears to be atrial flutter and average ventricular  response of approximately 90 beats per minute.  Later that day, she appeared to be  in atrial fibrillation with ventricular response of 110 beats per minute.  Patient  reported symptoms of shortness of breath on 9 occasions, all associated with sinus  rhythm with rates between 57 beats per minute and 74 beats per minute with  first-degree AV block commonly present.  There " was no atrial fibrillation during  these times.  Auto transmissions showed a single run of accelerated idioventricular  rhythm on 12/09 at time 23:26 with heart rates of 100 to 115 beats per minute for a  total of 7 beats in duration.  Other auto transmissions commonly showed rather  marked nocturnal sinus bradycardia with heart rates between 30 and 55 beats per  minute between approximately midnight and approximately 7:00 a.m.  There were no  clinically significant pauses noted.  Rare PVCs were noted during auto  transmissions.  Auto analysis showed heart rates between 30 and 120 beats per  minute.  Estimated PAC burden was 1%, PVC burden 2% and estimated atrial  fibrillation burden of 2%.  Atrial fibrillation was present on both 12/10 and 12/11  with 5 hours and 6 hours, respectively.  Average ventricular response during atrial  fibrillation was 83 beats per minute.     CONCLUSION:  Symptoms of heart racing on 1 occasion associated with atrial flutter.   Symptoms of shortness of breath on 9 occasions, associated with sinus rhythm and  first-degree AV block. Paroxysmal atrial fibrillation was present on 2 days with a  total of 5 hours and 6 hours, respectively with average ventricular response of  approximately 85 beats per minute and peak ventricular response of approximately 110  beats per minute.  A single short run of accelerated idioventricular rhythm was  noted on auto transmissions.  Rather marked nocturnal sinus bradycardia with heart  rates in the 30s were noted on multiple days.    ECHO, dated:  January 2020  Indications    Cardiomyopathy; hypertrophic   Dx: HOCM (hypertrophic obstructive cardiomyopathy) (H) [I42.1 (ICD-10-CM)]   Summary      Normal left ventricular size.The estimated left ventricular ejection fraction is 70%. This represents a normal ejection fraction. Moderate hypertrophy noted. Severe outflow tract obstruction is present. Left ventricular diastolic dysfunction. The findings are  consistent with hypertrophic cardiomyopathy with obstruction.    Mitral Valve: Normal mitral valve structure. Moderate mitral regurgitation. The jet is posterior directed and is eccentric. Moderate systolic anterior motion of the anterior leaflet is present.    Left atrial volume is mildly increased.    Mild pulmonary hypertension present.    Normal right ventricular size and systolic function.    When compared to the previous study dated 8/20/2020, no significant change.          Physical Examination Review of Systems   Vitals:    01/15/21 1254   BP: 128/72   Pulse: (!) 54   Resp: 10     Body mass index is 35.48 kg/m .  Wt Readings from Last 3 Encounters:   01/15/21 194 lb (88 kg)   01/07/21 193 lb (87.5 kg)   12/14/20 193 lb (87.5 kg)     [unfilled]  General Appearance:   no distress, normal body habitus   ENT/Mouth: membranes moist, no oral lesions or bleeding gums.      EYES:  no scleral icterus, normal conjunctivae   Neck: no carotid bruits or thyromegaly   Chest/Lungs:   lungs are clear to auscultation, no rales or wheezing, equal chest wall expansion    Cardiovascular:   Regular. Normal first and second heart sounds with 3/6 midsystolic crescendo murmur heard along left sternal border, and no rubs, or gallops. Carotid, radial and posterior tibial pulses are intact. Jugular venous pressure is normal. Lower extremity without edema bilaterally.   Abdomen:  Soft, non-tender, no organomegaly, masses, bruits. Bowel sounds are present   Extremities: no cyanosis or clubbing   Skin: no xanthelasma, warm.    Neurologic: normal  bilateral, no tremors     Psychiatric: alert and oriented x3, calm and appropriate    Review of Systems - History obtained from the patient  General ROS: negative  Psychological ROS: negative  Ophthalmic ROS: negative  ENT ROS: negative  Allergy and Immunology ROS: negative  Hematological and Lymphatic ROS: negative  Endocrine ROS: negative  Respiratory ROS: no cough, or  wheezing  Cardiovascular ROS: See HPI  Gastrointestinal ROS: no abdominal pain, change in bowel habits, or black or bloody stools  Genito-Urinary ROS: no dysuria, trouble voiding, or hematuria  Musculoskeletal ROS: negative  Neurological ROS: no TIA or stroke symptoms  Dermatological ROS: negative       Medical History  Surgical History Family History Social History   Past Medical History:   Diagnosis Date   ? Hypertrophic cardiomyopathy (H) 1/15/2021    Severe outflow tract obstruction Preserved LV systolic performance Basal septum: 20 mm Gadolinium enhancement positive: Basal septum No major positive and no other minor positive risk factors identified.   ? Paroxysmal atrial fibrillation (H) 1/15/2021    Dx Nov 2020 (NAIF) [id number] score = 3 Rx apixaban    Past Surgical History:   Procedure Laterality Date   ? [place] EXCIS TENDON SHEATH LESION, HAND/FINGER      Description: Hand Excision Of A Tendon Cyst;  Recorded: 03/07/2012;   ? [place] EXCISE BREAST CYST      Description: Breast Surgery Lumpectomy;  Recorded: 03/07/2012;  Comments: adenoma   ? [place] EXCISION NOSE POLYP(S),EXTENSIVE      Description: Extensive Excision Of Nasal Polyps;  Recorded: 03/07/2012;   ? [place] KNEE SCOPE,DIAGNOSTIC      Description: Arthroscopy Knee Left;  Recorded: 03/07/2012;   ? [place] REMOVAL OF OVARY(S)      Description: Oophorectomy;  Recorded: 06/26/2013;   ? US GUIDED NEEDLE PLACEMENT  7/20/2020    Family History   Problem Relation Age of Onset   ? Pacemaker Father    ? Heart attack Father 65   ? Colon cancer Father         XRT and chemo   ? Heart failure Father 79   ? Stroke Mother 89   ? Hypertension Mother    ? Endometrial cancer Mother    ? Other Mother         Scarlet fever vs rheumatic fever   ? Other Cousin 16        Drown, Hypertrophic (apparently via father - not realted)   ? Cardiomyopathy Cousin         Hypertrophic (apparently via father - not realted)   ? Cardiomyopathy Child         Hypertrophic (apparently via father - not  realted)   ? Sudden death Neg Hx     Social History     Socioeconomic History   ? Marital status:      Spouse name: Not on file   ? Number of children: Not on file   ? Years of education: Not on file   ? Highest education level: Not on file   Occupational History   ? Not on file   Social Needs   ? Financial resource strain: Not on file   ? Food insecurity     Worry: Not on file     Inability: Not on file   ? Transportation needs     Medical: Not on file     Non-medical: Not on file   Tobacco Use   ? Smoking status: Never Smoker   ? Smokeless tobacco: Never Used   Substance and Sexual Activity   ? Alcohol use: Never     Frequency: Never     Binge frequency: Never   ? Drug use: Never   ? Sexual activity: Not on file   Lifestyle   ? Physical activity     Days per week: Not on file     Minutes per session: Not on file   ? Stress: Not on file   Relationships   ? Social connections     Talks on phone: Not on file     Gets together: Not on file     Attends L[employer]-day service: Not on file     Active member of club or organization: Not on file     Attends meetings of clubs or organizations: Not on file     Relationship status: Not on file   ? Intimate partner violence     Fear of current or ex partner: Not on file     Emotionally abused: Not on file     Physically abused: Not on file     Forced sexual activity: Not on file   Other Topics Concern   ? Not on file   Social History Narrative   ? Not on file          Medications  Allergies   Scheduled Meds:  Continuous Infusions:  PRN Meds:. Allergies   Allergen Reactions   ? Shellfish Containing Products Shortness Of Breath   ? Acetaminophen          Lab Results    Chemistry/lipid CBC Cardiac Enzymes/BNP/TSH/INR   Lab Results   Component Value Date    CHOL 225 (H) 07/29/2020    HDL 64 07/29/2020    LDLCALC 139 (H) 07/29/2020    TRIG 109 07/29/2020    CREATININE 0.74 07/29/2020    BUN 20 07/29/2020    K 4.0 07/29/2020     07/29/2020     07/29/2020    CO2 25  07/29/2020    Lab Results   Component Value Date    WBC 7.3 05/27/2020    HGB 14.4 05/27/2020    HCT 45.3 05/27/2020    MCV 89 05/27/2020     05/27/2020    Lab Results   Component Value Date    TSH 2.48 05/27/2020

## 2021-06-30 NOTE — PROGRESS NOTES
Progress Notes by Diana Coello CNP at 3/26/2021 12:20 PM     Author: Diana Coello CNP Service: -- Author Type: Nurse Practitioner    Filed: 2021  6:42 PM Encounter Date: 3/26/2021 Status: Signed    : Diana Coello CNP (Nurse Practitioner)            [facility]  Patient Name: Romy Perez  Patient Age: 70 y.o.  YOB: 1950  MRN: [id number]    Date of Visit: 3/26/2021  Reason for Office Visit:   Chief Complaint   Patient presents with   ? Follow Up           Assessment / Plan / Medical Decision Makin. Essential hypertension  2. Aortic Stenosis  3. Paroxysmal atrial fibrillation (H)  4. Hypertrophic cardiomyopathy (H)  - irbesartan (AVAPRO) 300 MG tablet; Take 0.5 tablets (150 mg total) by mouth daily.  Dispense: 45 tablet; Refill: 0  Recommend the patient contact her cardiologist again in addition to contacting her insurance company and appeal once again the medical necessity for the septal excitedly procedure to be performed at the [facility].    I did reach out to patient's cardiologist Dr. Hossein Arnold and provided him with documentation of telephone conversation between the registered nurse at the [facility] and patient's insurance company.  Dr. Arnold indicated that it was not a concern as to if the [facility] perform a high volume of cardiac surgeries as that is known issue is how many septal mix oddities are performed at the [facility] compared to the [facility].  [facility] performed significantly more septal maxillotomy procedures the [facility].  He did indicate that if there is a surgeon at the [facility] that performs similar volumes of septal excitability as compared to the surgeons at the [facility] for the specific procedure and staying in network would be a reasonable consideration    5. Chronic migraine without aura without status migrainosus, not  intractable  - SUMAtriptan (IMITREX) 50 MG tablet; Take 1 tablet (50 mg total) by mouth every 2 (two) hours as needed for migraine.  Dispense: 12 tablet; Refill: 0      No orders of the defined types were placed in this encounter.  Followup: Return in about 4 weeks (around 4/23/2021). earlier if needed.      I spent a total of 32 minutes on the day of the visit.  . Time spent doing chart review, history and exam, documentation and further activities per the note      Health Maintenance Review  Health Maintenance   Topic Date Due   ? ADVANCE CARE PLANNING  08/10/2021   ? Pneumococcal Vaccine: 65+ Years (1 of 1 - PPSV23) 06/26/2021 (Originally 7/19/2017)   ? ZOSTER VACCINES (1 of 2) 06/26/2021 (Originally 7/19/2002)   ? COLORECTAL CANCER SCREENING  06/26/2021 (Originally 1950)   ? INFLUENZA VACCINE RULE BASED (1) 06/30/2021 (Originally 8/1/2020)   ? MEDICARE ANNUAL WELLNESS VISIT  07/29/2021   ? FALL RISK ASSESSMENT  07/29/2021   ? MAMMOGRAM  08/06/2022   ? LIPID  07/29/2025   ? TD 18+ HE  07/29/2030   ? DEXA SCAN  09/03/2035   ? COVID-19 Vaccine  Completed   ? Pneumococcal Vaccine: Pediatrics (0 to 5 Years) and At-Risk Patients (6 to 64 Years)  Aged Out   ? HEPATITIS B VACCINES  Aged Out   ? HEPATITIS C SCREENING  Discontinued         I have discontinued Romy Perez's omega 3-dha-epa-fish oil. I have also changed her SUMAtriptan. Additionally, I am having her maintain her calcium-vitamin D, cycloSPORINE, turmeric root extract, NON FORMULARY, NON FORMULARY, sodium chloride, hydrocortisone, NON FORMULARY, apixaban ANTICOAGULANT, cetaphil, metoprolol succinate, NON FORMULARY, and irbesartan.      HPI:  Romy Perez is a 70 y.o. year old who presents to the office today with chronic conditions including Aortic stenosis, hypertension, postmenopausal status, migraine headaches, Parsonage-Mcknight syndrome.  Patient presents to clinic today for GrupHediye    Patient's insurance company  declined patient's request to have surgery at [facility].  Patient will now need to write a letter to the insurance company.  Patient was referred by Dr. Arnold Cardiology office.     She has requested a refill of her Imitrex     Review of Systems- see HPI     Current Scheduled Meds:  Outpatient Encounter Medications as of 3/26/2021   Medication Sig Dispense Refill   ? apixaban ANTICOAGULANT (ELIQUIS) 5 mg Tab tablet Take 1 tablet (5 mg total) by mouth 2 (two) times a day. 60 tablet 11   ? calcium-vitamin D (CALCIUM-VITAMIN D) 500 mg(1,250mg) -200 unit per tablet Take 2 tablets by mouth daily.     ? cycloSPORINE (RESTASIS) 0.05 % ophthalmic emulsion Administer 1 drop to both eyes 2 (two) times a day.     ? hydrocortisone 1 % cream Apply topically as needed.      ? irbesartan (AVAPRO) 300 MG tablet Take 0.5 tablets (150 mg total) by mouth daily. 45 tablet 0   ? metoprolol succinate (TOPROL-XL) 100 MG 24 hr tablet Take 1 tablet (100 mg total) by mouth 2 (two) times a day. 180 tablet 1   ? NON FORMULARY Take 1 capsule by mouth daily. Magnesium Lactate     ? NON FORMULARY 3 capsules daily. DE 3 omega     ? sodium chloride (OCEAN) 0.65 % nasal spray Apply 1 spray into each nostril as needed for congestion.     ? SUMAtriptan (IMITREX) 50 MG tablet Take 1 tablet (50 mg total) by mouth every 2 (two) hours as needed for migraine. 12 tablet 0   ? turmeric root extract 500 mg cap Take 1 tablet by mouth daily.     ? vit E-glycerin-dimethicone (CETAPHIL) Lotn Apply topically. cetaphil lotion     ? [DISCONTINUED] irbesartan (AVAPRO) 300 MG tablet Take 0.5 tablets (150 mg total) by mouth daily. 45 tablet 0   ? [DISCONTINUED] SUMAtriptan (IMITREX) 50 MG tablet Take 50 mg by mouth every 2 (two) hours as needed for migraine.     ? NON FORMULARY Take 1 capsule by mouth daily. Nutridyrr-stress essentials balance      ? NON FORMULARY Take 1 packet by mouth daily. Immunocal  Platinum     ? [DISCONTINUED] NON FORMULARY 1 capsule daily.  Candibactrin     ? [DISCONTINUED] omega 3-dha-epa-fish oil 100-160-1,000 mg cap 2 capsules in the morning and 1 capsule at night       No facility-administered encounter medications on file as of 3/26/2021.      Past Medical History:   Diagnosis Date   ? Hypertrophic cardiomyopathy (H) 1/15/2021    Severe outflow tract obstruction Preserved LV systolic performance Basal septum: 20 mm Gadolinium enhancement positive: Basal septum No major positive and no other minor positive risk factors identified.   ? Paroxysmal atrial fibrillation (H) 1/15/2021    Dx Nov 2020 (NAIF) [id number] score = 3 Rx apixaban   ? Parsonage-Mcknight syndrome      Past Surgical History:   Procedure Laterality Date   ? EP LOOP RECORDER IMPLANT N/A 1/29/2021    Procedure: EP Loop Recorder Insertion;  Surgeon: Paul Garza MD;  Location: [facility];  Service: Cardiology   ? [place] EXCIS TENDON SHEATH LESION, HAND/FINGER      Description: Hand Excision Of A Tendon Cyst;  Recorded: 03/07/2012;   ? [place] EXCISE BREAST CYST      Description: Breast Surgery Lumpectomy;  Recorded: 03/07/2012;  Comments: adenoma   ? [place] EXCISION NOSE POLYP(S),EXTENSIVE      Description: Extensive Excision Of Nasal Polyps;  Recorded: 03/07/2012;   ? [place] KNEE SCOPE,DIAGNOSTIC      Description: Arthroscopy Knee Left;  Recorded: 03/07/2012;   ? [place] REMOVAL OF OVARY(S)      Description: Oophorectomy;  Recorded: 06/26/2013;   ? US GUIDED NEEDLE PLACEMENT  7/20/2020     Social History     Tobacco Use   ? Smoking status: Never Smoker   ? Smokeless tobacco: Never Used   Substance Use Topics   ? Alcohol use: Never     Frequency: Never     Binge frequency: Never   ? Drug use: Never     Family History   Problem Relation Age of Onset   ? Pacemaker Father    ? Heart attack Father 65   ? Colon cancer Father         XRT and chemo   ? Heart failure Father 79   ? Stroke Mother 89   ? Hypertension Mother    ? Endometrial cancer Mother    ? Other Mother         Scarlet fever vs  "rheumatic fever   ? Other Cousin 16        Drown, Hypertrophic (apparently via father - not realted)   ? Cardiomyopathy Cousin         Hypertrophic (apparently via father - not realted)   ? Cardiomyopathy Child         Hypertrophic (apparently via father - not realted)   ? Sudden death Neg Hx      Social History     Social History Narrative   ? Not on file       Objective / Physical Examination:  Vitals:    03/26/21 1217   BP: 116/60   Pulse: (!) 50   Temp: 98.4  F (36.9  C)   SpO2: 95%   Weight: 192 lb (87.1 kg)   Height: 5' 3\" (1.6 m)     Wt Readings from Last 3 Encounters:   03/26/21 192 lb (87.1 kg)   02/05/21 195 lb 8 oz (88.7 kg)   01/29/21 189 lb 12.8 oz (86.1 kg)     Body mass index is 34.01 kg/m .     General Appearance: Alert and oriented, cooperative, affect appropriate, speech clear, in no apparent distress  Head: Normocephalic, atraumatic  Eyes:  Conjunctivae clear and sclerae non-icteric  Neck: Supple, trachea midline. No cervical adenopathy  Lungs: Clear to auscultation bilaterally. No adventitious lung sounds noted. Normal inspiratory and expiratory effort  Cardiovascular: Regular rate, normal S1, S2.  Grade 3/6 systolic murmur appreciated  Extremities: Pulses 2+ and equal throughout. No edema. S  Integumentary: Warm and dry. Without suspicious looking lesions  Neuro: Alert and oriented, follows commands appropriately.     Remote Device Check    Result Date: 3/16/2021  Type: alert loop recorder remote transmission for AF. Presenting rhythm: ventricular sensing, appears sinus 55 bpm. Battery status: OK. Arrhythmias: since 2/5/21; one AF episode on 3/15/21 lasting 13.5hrs, v-rates >/=120bpm 25%, AF burden 1.2%. 6 pause episodes, all appear to be normal sinus with R-wave undersensing. Anticoagulant: apixaban. Comments: normal pacemaker function. Routed to device RN for review. TENISHA.  ADD: See epic for note. BK     No results found for this or any previous visit (from the past 240 hour(s)).  Diagnostics: "     Results for orders placed or performed in visit on 03/16/21   Remote Device Check   Result Value Ref Range    Device Manufacture [employer]     Device Model [id number] Reveal LINQ     Device Serial Number [id number]     Device Type Cardiac Monitor     Device Implanting Provider FELY Garza II     Comments/Summary       Type: alert loop recorder remote transmission for AF.   Presenting rhythm: ventricular sensing, appears sinus 55 bpm.  Battery status: OK.  Arrhythmias: since 2/5/21; one AF episode on 3/15/21 lasting 13.5hrs, v-rates >/=120bpm 25%, AF burden 1.2%. 6 pause episodes, all appear to be normal sinus with R-wave undersensing.  Anticoagulant: apixaban.  Comments: normal pacemaker function. Routed to device RN for review. TENISHA.  ADD: See epic for note. BK         Quality review:     PHQ-2 Total Score: 1 (7/29/2020 12:00 PM)      No data recorded      Diana Coello CNP  Internal Medicine  [address] [address]

## 2021-06-30 NOTE — PROGRESS NOTES
Progress Notes by Hossein Arnold MD at 1/22/2021  1:30 PM     Author: Hossein Arnold MD Service: -- Author Type: Physician    Filed: 1/22/2021  2:54 PM Encounter Date: 1/22/2021 Status: Signed    : Hossein Arnold MD (Physician)           Thank you, Diana Mahoney, [facility], for asking the [facility] team to see Ms. Romy Perez to evaluate Follow-up.      Assessment/Recommendations   Assessment:    1. Hypertrophic cardiomyopathy with dynamic LVOT gradient -  some exertional limitations with continued gradient of 81 mmHg despite optimal medical therapy. Significantly impaired exercise tolerance on treadmill stress test. No evidence at this time of ventricular arrhythmia and patchy scar on cardiac MRI.  2. Hypertension - stable.  3. Paroxysmal atrial fibrillation - with RVR - symptomatic. This causes a problem with her slow resting heart rate and LVOT gradient. Anticipating an implantable loop recorder to monitor burden of afib.    Plan:    Does not currently meet criteria for primary prevention ICD, but she has seen a genetic counselor and if a high risk gene is identified, this can be re-evaluated.    Will refer to cardiac surgery for septal myectomy. Her cousin had this procedure performed at [facility] so she prefers to be evaluated there.    Continue eliquis for stroke prevention from Afib.    Follow up with the genetic counselor for genetic testing for HOCM.    Follow up with me in 6 months or sooner if needed.         History of Present Illness   Ms. Romy Perez is a 70 y.o. female with a significant past history of hypertension who presents for follow-up of HOCM and atrial fibrillation.     has hypertrophic obstructive cardiomyopathy with a prior echo performed on 8/20/2020 that demonstrated progression of the LVOT gradient, peaking at 100 mmHg, which is increased from 40 mmHg a year ago. This does not result in  significant mitral valve regurgitation but asymmetric septal hypertrophy was noted. 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 A cardiac MRI confirmed the diagnosis of HOCM. Her most recent LVOT gradient was a peak of 80 mmHg.    She has been feeling generally well lately. She walks for 30 minutes nearly daily and does other exercises each morning. She does not have significant dyspnea on exertion at this time with the pace that she walks. She has no specific complaints today.    Other than noted above, Ms. Perez denies any chest pain/pressure/tightness, shortness of breath at rest or with exertion, light headedness/dizziness, pre-syncope, syncope, lower extremity swelling, palpitations, paroxysmal nocturnal dyspnea (PND), or orthopnea.     Cardiac Problems and Cardiac Diagnostics     Most Recent Cardiac testing:    Cardiac rhythm monitor 11/16/2020  CONCLUSION:  Symptoms of heart racing on 1 occasion associated with atrial flutter.   Symptoms of shortness of breath on 9 occasions, associated with sinus rhythm and  first-degree AV block. Paroxysmal atrial fibrillation was present on 2 days with a  total of 5 hours and 6 hours, respectively with average ventricular response of  approximately 85 beats per minute and peak ventricular response of approximately 110  beats per minute.  A single short run of accelerated idioventricular rhythm was  noted on auto transmissions.  Rather marked nocturnal sinus bradycardia with heart  rates in the 30s were noted on multiple days.       TTE 1/7/2021    Normal left ventricular size.The estimated left ventricular ejection fraction is 70%. This represents a normal ejection fraction. Moderate hypertrophy noted. Severe outflow tract obstruction is present. Left ventricular diastolic dysfunction. The findings are consistent with hypertrophic cardiomyopathy with obstruction.    Mitral Valve: Normal mitral valve  structure. Moderate mitral regurgitation. The jet is posterior directed and is eccentric. Moderate systolic anterior motion of the anterior leaflet is present.    Left atrial volume is mildly increased.    Mild pulmonary hypertension present.    Normal right ventricular size and systolic function.    When compared to the previous study dated 8/20/2020, no significant change.    Cardiac MRI 9/21/2020  IMPRESSIONS:    1.  Normal left ventricular size, thickened septum with maximal measurement 18-20 mm, inferolateral wall 7-8 mm. The quantified left ventricular ejection fraction is 69%. It appears less deformation of thickened septum per tagging images. There is no rest perfusion defect. It is evident that there is LVOT obstruction by flow turbulence and EMELYN.  The late delayed gadolinium enhancement study demonstrated that there are patchy gadolinium enhancement in thickened basal septal segment and also in basal lateral segment. Put all together, the findings are consistent with hypertrophic cardiomyopathy.   2.  Normal right ventricular size function.    3.  Mildly enlarged both atria.  4.  Moderate mitral valve regurgitation.  5.  Mildly dilated mid ascending aorta 40 mm.    ECHO (report reviewed):   Echo results:   Results for orders placed during the hospital encounter of 08/20/20   Echo Complete [ECH10] 08/20/2020    Addendum   1.Left ventricle ejection fraction is normal. The calculated left  ventricular ejection fraction is 65%.     2.Moderate concentric left ventricle hypertrophy with moderate to severe  left ventricular outflow tract gradient due to this as well as associated  systolic anterior motion of mitral valve and chordal apparatus. Estimated  left ventricular for tract gradient 100 mmHg.     3.TAPSE is normal, which is consistent with normal right ventricular  systolic function.     4.The ascending aorta is mildly dilated.     5.No hemodynamically significant valvular heart abnormalities.     6.When  compared to the previous study dated 6/19/2019, no significant  change. Prior echo read mild to moderate aortic stenosis, doubt true  stenosis but increased left ventricular outflow tract gradient secondary  to systolic anterior motion as well as moderate left ventricular  hypertrophy, should consider hypertrophic cardiomyopathy in differential.  Suggest cardiac MRI. Left ventricular outflow tract gradient was felt to  be similar on review of prior study.          Milena Villanueva MD 8/20/2020 11:50 AM           Exercise stress ECG 11/16/2020  ?  Poor exercise tolerance (4:20 on Chirs protocol)  ?  No exercise-induced arrhythmias or hypotension  ?  The stress electrocardiogram is nondiagnostic for inducible ischemic EKG changes due to inadequate maximal heart rate         Medications  Allergies   Current Outpatient Medications   Medication Sig Dispense Refill   ? apixaban ANTICOAGULANT (ELIQUIS) 5 mg Tab tablet Take 1 tablet (5 mg total) by mouth 2 (two) times a day. 60 tablet 11   ? calcium-vitamin D (CALCIUM-VITAMIN D) 500 mg(1,250mg) -200 unit per tablet Take 2 tablets by mouth daily.     ? cycloSPORINE (RESTASIS) 0.05 % ophthalmic emulsion Administer 1 drop to both eyes 2 (two) times a day.     ? hydrocortisone 1 % cream Apply topically 2 (two) times a day.     ? irbesartan (AVAPRO) 300 MG tablet Take 0.5 tablets (150 mg total) by mouth daily. 45 tablet 0   ? metoprolol succinate (TOPROL-XL) 100 MG 24 hr tablet Take 1 tablet (100 mg total) by mouth 2 (two) times a day. (Patient taking differently: Take 50 mg by mouth 2 (two) times a day. ) 180 tablet 3   ? NON FORMULARY 1 capsule daily. Candibactrin     ? NON FORMULARY Take 2 capsules by mouth daily. Nutridyrr-stress essentials balance      ? NON FORMULARY Take 1 capsule by mouth daily. Alleerplex     ? NON FORMULARY Take 1 packet by mouth daily. imminocal Platinum     ? NON FORMULARY Take 1 capsule by mouth daily. Magnesium Lactate     ? omega 3-dha-epa-fish  oil 100-160-1,000 mg cap 2 capsules in the morning and 1 capsule at night     ? sodium chloride (OCEAN) 0.65 % nasal spray Apply 1 spray into each nostril as needed for congestion.     ? turmeric root extract 500 mg cap Take 1 tablet by mouth daily.       No current facility-administered medications for this visit.       Allergies   Allergen Reactions   ? Shellfish Containing Products Shortness Of Breath   ? Acetaminophen         Physical Examination Review of Systems   Vitals:    01/22/21 1324   BP: 124/80   Pulse: (!) 52   Resp: 10     Body mass index is 35.3 kg/m .  Wt Readings from Last 3 Encounters:   01/22/21 193 lb (87.5 kg)   01/15/21 194 lb (88 kg)   01/07/21 193 lb (87.5 kg)       General Appearance:   Pleasant  female, appears stated age. no acute distress, overweight body habitus   ENT/Mouth: Wearing a mask    EYES:  no scleral icterus, normal conjunctivae   Neck: supple   Respiratory:   lungs are clear to auscultation, no rales or wheezing, equal chest wall expansion    Cardiovascular:   Regular rhythm, normal rate. Normal first and second heart sounds with 3/6 sytolic murmur at RUSB. no rubs or gallops; Jugular venous pressure normal, no edema bilaterally    Abdomen/GI:  Soft, non-tender   Extremities: no cyanosis or clubbing   Skin: no xanthelasma, warm.    Heme/lymph/ Immunology No apparent bleeding noted.   Neurologic: Alert and oriented. normal gait, no tremors   Psychiatric: Pleasant, calm, appropriate affect.    A complete 10 system review of systems was performed and is negative except as mentioned in the HPI or below:  General: WNL  Eyes: WNL  Ears/Nose/Throat: WNL  Lungs: Shortness of Breath  Heart: WNL  Stomach: WNL  Bladder: WNL  Muscle/Joints: WNL  Skin: WNL  Nervous System: WNL  Mental Health: WNL     Blood: WNL       Past History   Past Medical History:   Patient Active Problem List    Diagnosis Date Noted   ? A-fib (H) 01/21/2021   ? Hypertrophic cardiomyopathy (H) 01/15/2021   ? Obesity  (BMI 35.0-39.9) with comorbidity (H) 01/15/2021   ? Paroxysmal atrial fibrillation (H) 01/15/2021   ? Migraine Headache    ? 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 Essential Hypertension    ? Aortic Stenosis        Past Surgical History:   Past Surgical History:   Procedure Laterality Date   ? OH EXCIS TENDON SHEATH LESION, HAND/FINGER      Description: Hand Excision Of A Tendon Cyst;  Recorded: 03/07/2012;   ? OH EXCISE BREAST CYST      Description: Breast Surgery Lumpectomy;  Recorded: 03/07/2012;  Comments: adenoma   ? OH EXCISION NOSE POLYP(S),EXTENSIVE      Description: Extensive Excision Of Nasal Polyps;  Recorded: 03/07/2012;   ? OH KNEE SCOPE,DIAGNOSTIC      Description: Arthroscopy Knee Left;  Recorded: 03/07/2012;   ? OH REMOVAL OF OVARY(S)      Description: Oophorectomy;  Recorded: 06/26/2013;   ? US GUIDED NEEDLE PLACEMENT  7/20/2020       Family History:   Family History   Problem Relation Age of Onset   ? Pacemaker Father    ? Heart attack Father 65   ? Colon cancer Father         XRT and chemo   ? Heart failure Father 79   ? Stroke Mother 89   ? Hypertension Mother    ? Endometrial cancer Mother    ? Other Mother         Scarlet fever vs rheumatic fever   ? Other Cousin 16        Drown, Hypertrophic (apparently via father - not realted)   ? Cardiomyopathy Cousin         Hypertrophic (apparently via father - not realted)   ? Cardiomyopathy Child         Hypertrophic (apparently via father - not realted)   ? Sudden death Neg Hx     cousin possibly with HCM.    Social History:   Social History     Socioeconomic History   ? Marital status:      Spouse name: Not on file   ? Number of children: Not on file   ? Years of education: Not on file   ? Highest education level: Not on file   Occupational History   ? Not on file   Social Needs   ? Financial resource strain: Not on file   ? Food insecurity     Worry: Not on file     Inability: Not on file   ? Transportation needs     Medical: Not on file      Non-medical: Not on file   Tobacco Use   ? Smoking status: Never Smoker   ? Smokeless tobacco: Never Used   Substance and Sexual Activity   ? Alcohol use: Never     Frequency: Never     Binge frequency: Never   ? Drug use: Never   ? Sexual activity: Not on file   Lifestyle   ? Physical activity     Days per week: Not on file     Minutes per session: Not on file   ? Stress: Not on file   Relationships   ? Social connections     Talks on phone: Not on file     Gets together: Not on file     Attends Christian service: Not on file     Active member of club or organization: Not on file     Attends meetings of clubs or organizations: Not on file     Relationship status: Not on file   ? Intimate partner violence     Fear of current or ex partner: Not on file     Emotionally abused: Not on file     Physically abused: Not on file     Forced sexual activity: Not on file   Other Topics Concern   ? Not on file   Social History Narrative   ? Not on file              Lab Results    Chemistry/lipid CBC Cardiac Enzymes/BNP/TSH/INR   Lab Results   Component Value Date    CHOL 225 (H) 07/29/2020    HDL 64 07/29/2020    LDLCALC 139 (H) 07/29/2020    TRIG 109 07/29/2020    CREATININE 0.74 07/29/2020    BUN 20 07/29/2020    K 4.0 07/29/2020     07/29/2020     07/29/2020    CO2 25 07/29/2020    Lab Results   Component Value Date    WBC 7.3 05/27/2020    HGB 14.4 05/27/2020    HCT 45.3 05/27/2020    MCV 89 05/27/2020     05/27/2020    Lab Results   Component Value Date    TSH 2.48 05/27/2020

## 2021-07-01 ENCOUNTER — HOSPITAL ENCOUNTER (OUTPATIENT)
Dept: CARDIOLOGY | Facility: CLINIC | Age: 71
Discharge: HOME | End: 2021-07-01
Attending: INTERNAL MEDICINE
Payer: MEDICARE

## 2021-07-01 DIAGNOSIS — I31.39 PERICARDIAL EFFUSION: ICD-10-CM

## 2021-07-01 LAB
AORTIC ROOT: 3.3 CM
AORTIC VALVE MEAN VELOCITY: 105 CM/S
ASCENDING AORTA: 3.7 CM
AV DIMENSIONLESS INDEX VTI: 0.6
AV MEAN GRADIENT: 5 MMHG
AV PEAK GRADIENT: 10.9 MMHG
AV VALVE AREA: 2.1 CM2
AV VELOCITY RATIO: 0.8
BSA FOR ECHO PROCEDURE: 1.94 M2
CV BLOOD PRESSURE: ABNORMAL MMHG
CV ECHO HEIGHT: 63 IN
CV ECHO WEIGHT: 187 LBS
DOP CALC AO PEAK VEL: 165 CM/S
DOP CALC AO VTI: 37.9 CM
DOP CALC LVOT AREA: 3.46 CM2
DOP CALC LVOT DIAMETER: 2.1 CM
DOP CALC LVOT PEAK VEL: 126 CM/S
DOP CALC LVOT STROKE VOLUME: 80.3 CM3
DOP CALC MV VTI: 32.6 CM
DOP CALCLVOT PEAK VEL VTI: 23.2 CM
EJECTION FRACTION: 64 % (ref 55–75)
FRACTIONAL SHORTENING: 35.9 % (ref 28–44)
INTERVENTRICULAR SEPTUM IN END DIASTOLE: 1.67 CM (ref 0.6–0.9)
IVS/PW RATIO: 1.4
LA AREA 1: 25.5 CM2
LA AREA 2: 22.6 CM2
LEFT ATRIUM LENGTH: 6.1 CM
LEFT ATRIUM SIZE: 4.2 CM
LEFT ATRIUM TO AORTIC ROOT RATIO: 1.27 NO UNITS
LEFT ATRIUM VOLUME INDEX: 41.4 ML/M2
LEFT ATRIUM VOLUME: 80.3 ML
LEFT VENTRICLE CARDIAC INDEX: 3.4 L/MIN/M2
LEFT VENTRICLE CARDIAC OUTPUT: 6.7 L/MIN
LEFT VENTRICLE DIASTOLIC VOLUME INDEX: 26.2 CM3/M2 (ref 29–61)
LEFT VENTRICLE DIASTOLIC VOLUME: 50.8 CM3 (ref 46–106)
LEFT VENTRICLE HEART RATE: 83 BPM
LEFT VENTRICLE MASS INDEX: 119.3 G/M2
LEFT VENTRICLE SYSTOLIC VOLUME INDEX: 9.5 CM3/M2 (ref 8–24)
LEFT VENTRICLE SYSTOLIC VOLUME: 18.5 CM3 (ref 14–42)
LEFT VENTRICULAR INTERNAL DIMENSION IN DIASTOLE: 4.15 CM (ref 3.8–5.2)
LEFT VENTRICULAR INTERNAL DIMENSION IN SYSTOLE: 2.66 CM (ref 2.2–3.5)
LEFT VENTRICULAR MASS: 231.4 G
LEFT VENTRICULAR OUTFLOW TRACT MEAN GRADIENT: 3 MMHG
LEFT VENTRICULAR OUTFLOW TRACT MEAN VELOCITY: 74.5 CM/S
LEFT VENTRICULAR OUTFLOW TRACT PEAK GRADIENT: 6 MMHG
LEFT VENTRICULAR POSTERIOR WALL IN END DIASTOLE: 1.22 CM (ref 0.6–0.9)
LV STROKE VOLUME INDEX: 41.4 ML/M2
MITRAL VALVE DECELERATION SLOPE: 3130 MM/S2
MITRAL VALVE E/A RATIO: 0.8
MITRAL VALVE MEAN INFLOW VELOCITY: 72.9 CM/S
MITRAL VALVE PEAK VELOCITY: 102 CM/S
MITRAL VALVE PRESSURE HALF-TIME: 68 MS
MV AREA VTI: 2.46 CM2
MV AVERAGE E/E' RATIO: 12.2 CM/S
MV DECELERATION TIME: 232 MS
MV E'TISSUE VEL-LAT: 7.98 CM/S
MV E'TISSUE VEL-MED: 3.97 CM/S
MV LATERAL E/E' RATIO: 9.1
MV MEAN GRADIENT: 2 MMHG
MV MEDIAL E/E' RATIO: 18.3
MV PEAK A VELOCITY: 89.7 CM/S
MV PEAK E VELOCITY: 72.8 CM/S
MV PEAK GRADIENT: 4.2 MMHG
MV VALVE AREA BY CONTINUITY EQUATION: 2.5 CM2
MV VALVE AREA PRESSURE 1/2 METHOD: 3.2 CM2
NUC REST DIASTOLIC VOLUME INDEX: 2992 LBS
NUC REST SYSTOLIC VOLUME INDEX: 63 IN
PV ACCELERATION TIME: 106 MS
TRICUSPID REGURGITATION PEAK PRESSURE GRADIENT: 27.5 MMHG
TRICUSPID VALVE ANULAR PLANE SYSTOLIC EXCURSION: 1.6 CM
TRICUSPID VALVE PEAK REGURGITANT VELOCITY: 262 CM/S

## 2021-07-01 ASSESSMENT — MIFFLIN-ST. JEOR: SCORE: 1347.36

## 2021-07-06 VITALS
DIASTOLIC BLOOD PRESSURE: 60 MMHG | TEMPERATURE: 98.3 F | OXYGEN SATURATION: 91 % | WEIGHT: 192 LBS | BODY MASS INDEX: 34.01 KG/M2 | SYSTOLIC BLOOD PRESSURE: 122 MMHG | HEART RATE: 62 BPM

## 2021-07-06 VITALS — WEIGHT: 187 LBS | BODY MASS INDEX: 33.13 KG/M2 | HEIGHT: 63 IN

## 2021-07-19 ENCOUNTER — AMBULATORY - HEALTHEAST (OUTPATIENT)
Dept: MULTI SPECIALTY CLINIC | Facility: CLINIC | Age: 71
End: 2021-07-19

## 2021-07-20 ENCOUNTER — TRANSFERRED RECORDS (OUTPATIENT)
Dept: HEALTH INFORMATION MANAGEMENT | Facility: CLINIC | Age: 71
End: 2021-07-20

## 2021-07-21 ENCOUNTER — TRANSFERRED RECORDS (OUTPATIENT)
Dept: HEALTH INFORMATION MANAGEMENT | Facility: CLINIC | Age: 71
End: 2021-07-21

## 2021-07-22 ENCOUNTER — TELEPHONE (OUTPATIENT)
Dept: FAMILY MEDICINE | Facility: CLINIC | Age: 71
End: 2021-07-22

## 2021-07-22 NOTE — TELEPHONE ENCOUNTER
Reason for Call:  Other appointment and covid test    Detailed comments: Pt states she is not feeling well at all, fever, running nose, aches, she spent the majority of yesterday at "[employer] [facility] and she is bandaged up.    Would like to be tested for Covid    Would like to change her appt from Face to Face to telephone encounter     Phone Number Patient can be reached at: Home number on file [phone] (home)    Call taken on 7/22/2021 at 3:55 PM by Simran Eckert

## 2021-07-23 ENCOUNTER — APPOINTMENT (OUTPATIENT)
Dept: RADIOLOGY | Facility: HOSPITAL | Age: 71
End: 2021-07-23
Payer: MEDICARE

## 2021-07-23 ENCOUNTER — VIRTUAL VISIT (OUTPATIENT)
Dept: INTERNAL MEDICINE | Facility: CLINIC | Age: 71
End: 2021-07-23
Payer: MEDICARE

## 2021-07-23 ENCOUNTER — HOSPITAL ENCOUNTER (EMERGENCY)
Facility: HOSPITAL | Age: 71
Discharge: HOME | End: 2021-07-23
Attending: STUDENT IN AN ORGANIZED HEALTH CARE EDUCATION/TRAINING PROGRAM | Admitting: STUDENT IN AN ORGANIZED HEALTH CARE EDUCATION/TRAINING PROGRAM
Payer: MEDICARE

## 2021-07-23 VITALS
RESPIRATION RATE: 26 BRPM | SYSTOLIC BLOOD PRESSURE: 146 MMHG | WEIGHT: 179 LBS | TEMPERATURE: 98.1 F | DIASTOLIC BLOOD PRESSURE: 81 MMHG | OXYGEN SATURATION: 93 % | HEART RATE: 69 BPM | BODY MASS INDEX: 31.71 KG/M2

## 2021-07-23 DIAGNOSIS — R05.9 COUGH: ICD-10-CM

## 2021-07-23 DIAGNOSIS — R06.02 SOB (SHORTNESS OF BREATH): Primary | ICD-10-CM

## 2021-07-23 LAB
ANION GAP SERPL CALCULATED.3IONS-SCNC: 9 MMOL/L (ref 5–18)
ATRIAL RATE - MUSE: 67 BPM
BNP SERPL-MCNC: 172 PG/ML (ref 0–117)
BUN SERPL-MCNC: 21 MG/DL (ref 8–22)
CALCIUM SERPL-MCNC: 9.5 MG/DL (ref 8.5–10.5)
CHLORIDE BLD-SCNC: 106 MMOL/L (ref 98–107)
CO2 SERPL-SCNC: 30 MMOL/L (ref 22–31)
CREAT SERPL-MCNC: 0.87 MG/DL (ref 0.6–1.1)
DIASTOLIC BLOOD PRESSURE - MUSE: NORMAL MMHG
ERYTHROCYTE [DISTWIDTH] IN BLOOD BY AUTOMATED COUNT: 14.6 % (ref 10–15)
GFR SERPL CREATININE-BSD FRML MDRD: 68 ML/MIN/1.73M2
GLUCOSE BLD-MCNC: 99 MG/DL (ref 70–125)
HCT VFR BLD AUTO: 40.3 % (ref 35–47)
HGB BLD-MCNC: 12.4 G/DL (ref 11.7–15.7)
INTERPRETATION ECG - MUSE: NORMAL
MAGNESIUM SERPL-MCNC: 2.2 MG/DL (ref 1.8–2.6)
MCH RBC QN AUTO: 27.1 PG (ref 26.5–33)
MCHC RBC AUTO-ENTMCNC: 30.8 G/DL (ref 31.5–36.5)
MCV RBC AUTO: 88 FL (ref 78–100)
P AXIS - MUSE: 64 DEGREES
PLATELET # BLD AUTO: 214 10E3/UL (ref 150–450)
POTASSIUM BLD-SCNC: 3.4 MMOL/L (ref 3.5–5)
PR INTERVAL - MUSE: 242 MS
QRS DURATION - MUSE: 176 MS
QT - MUSE: 500 MS
QTC - MUSE: 528 MS
R AXIS - MUSE: -74 DEGREES
RBC # BLD AUTO: 4.57 10E6/UL (ref 3.8–5.2)
SARS-COV-2 RNA RESP QL NAA+PROBE: NEGATIVE
SODIUM SERPL-SCNC: 145 MMOL/L (ref 136–145)
SYSTOLIC BLOOD PRESSURE - MUSE: NORMAL MMHG
T AXIS - MUSE: 138 DEGREES
TROPONIN I SERPL-MCNC: 0.03 NG/ML (ref 0–0.29)
VENTRICULAR RATE- MUSE: 67 BPM
WBC # BLD AUTO: 8 10E3/UL (ref 4–11)

## 2021-07-23 PROCEDURE — 80048 BASIC METABOLIC PNL TOTAL CA: CPT | Performed by: PHYSICIAN ASSISTANT

## 2021-07-23 PROCEDURE — 71046 X-RAY EXAM CHEST 2 VIEWS: CPT

## 2021-07-23 PROCEDURE — 99207 PR NO CHARGE LOS: CPT | Performed by: NURSE PRACTITIONER

## 2021-07-23 PROCEDURE — 36415 COLL VENOUS BLD VENIPUNCTURE: CPT | Performed by: PHYSICIAN ASSISTANT

## 2021-07-23 PROCEDURE — 87635 SARS-COV-2 COVID-19 AMP PRB: CPT | Performed by: STUDENT IN AN ORGANIZED HEALTH CARE EDUCATION/TRAINING PROGRAM

## 2021-07-23 PROCEDURE — 83880 ASSAY OF NATRIURETIC PEPTIDE: CPT | Performed by: STUDENT IN AN ORGANIZED HEALTH CARE EDUCATION/TRAINING PROGRAM

## 2021-07-23 PROCEDURE — 84484 ASSAY OF TROPONIN QUANT: CPT | Performed by: PHYSICIAN ASSISTANT

## 2021-07-23 PROCEDURE — 99285 EMERGENCY DEPT VISIT HI MDM: CPT | Mod: 25

## 2021-07-23 PROCEDURE — 93005 ELECTROCARDIOGRAM TRACING: CPT | Performed by: PHYSICIAN ASSISTANT

## 2021-07-23 PROCEDURE — 85027 COMPLETE CBC AUTOMATED: CPT | Performed by: PHYSICIAN ASSISTANT

## 2021-07-23 PROCEDURE — C9803 HOPD COVID-19 SPEC COLLECT: HCPCS

## 2021-07-23 PROCEDURE — 83735 ASSAY OF MAGNESIUM: CPT | Performed by: PHYSICIAN ASSISTANT

## 2021-07-23 RX ORDER — SUMATRIPTAN 50 MG/1
50 TABLET, FILM COATED ORAL PRN
COMMUNITY
Start: 2021-03-26 | End: 2022-10-11

## 2021-07-23 RX ORDER — POTASSIUM CHLORIDE 1500 MG/1
20 TABLET, EXTENDED RELEASE ORAL DAILY
COMMUNITY
End: 2022-06-23

## 2021-07-23 RX ORDER — METOPROLOL TARTRATE 50 MG
50 TABLET ORAL 2 TIMES DAILY
COMMUNITY
Start: 2021-06-05 | End: 2022-01-31

## 2021-07-23 RX ORDER — VIT E ACET/GLY/DIMETH/WATER
1 LOTION (ML) TOPICAL DAILY
COMMUNITY

## 2021-07-23 RX ORDER — FUROSEMIDE 40 MG
40 TABLET ORAL DAILY
COMMUNITY
End: 2022-07-22

## 2021-07-23 ASSESSMENT — ENCOUNTER SYMPTOMS
DIARRHEA: 0
CHILLS: 1
FEVER: 0
SHORTNESS OF BREATH: 1
NAUSEA: 0
COUGH: 1
FATIGUE: 1
VOMITING: 0
ABDOMINAL PAIN: 0
RHINORRHEA: 1

## 2021-07-23 NOTE — ED PROVIDER NOTES
NAME: Romy Izquierdo  AGE: 71 year old female  YOB: 1950  MRN: [id number]  EVALUATION DATE & TIME: 2021  9:47 AM    PCP: Diana Coello    ED PROVIDER: Morgan Salcedo M.D.      Chief Complaint   Patient presents with     Cough         FINAL IMPRESSION:  1. Cough        MEDICAL DECISION MAKIN:59 AM I met with the patient, obtained history, performed an initial exam, and discussed options and plan for diagnostics and treatment here in the ED.   1:14 PM Spoke with Dr. Arnold, cardiologist.   1:16 PM Rechecked and updated the patient. We discussed the plan for discharge and the patient is agreeable. Reviewed supportive cares, symptomatic treatment, outpatient follow up, and reasons to return to the Emergency Department. Patient to be discharged by ED RN.   Pertinent Labs & Imaging studies reviewed. (See chart for details)     70 y/o F with history of  hypertrophic cardiomyopathy with dynamic LVOT gradient s/p septal myectomy 21 with small/moderate post operative pericardial effusion), CHF with preserved LVEF, paroxysmal a fib on elqiuis who presents with cough. Started to get cough and runny nose last few days, daughter has been sick with similar symptoms. No chest pain, no real shortness of breath, no leg swelling or other compliants. No documented fevers. Dx includes but not limited to viral infection, pneumonia, CHF, PE, ACS, among others. Her labs are reassuring here and she is nontoxic appearing. BNP slightly elevated no priors to compare to and does not appear volume up on exam, do not suspect CHF exacerbation. CXR without signs of pneumonia or concerning CHF. Covid swab is negative. Given constellation of symptoms (cough/runny nose) and is on eliquis do not suspect PE. Overall her workup is reassuring. Did discuss with Dr. Arnold patient's cardiologist who agrees with discharge and plan.     The importance of close follow up was discussed. We reviewed warning signs and  symptoms, and I instructed Ms. Izquierdo to return to the emergency department immediately if she develops any new or worsening symptoms. I provided additional verbal discharge instructions. Ms. Izquierdo expressed understanding and agreement with this plan of care, her questions were answered, and she was discharged in stable condition.     MEDICATIONS GIVEN IN THE EMERGENCY:  Medications - No data to display    NEW PRESCRIPTIONS STARTED AT TODAY'S ER VISIT:  Discharge Medication List as of 7/23/2021  1:29 PM             =================================================================    HPI    Patient information was obtained from: patient     Use of : N/A       Romy Izquierdo is a 71 year old female with a past medical history of hypertrophic cardiomyopathy s/p septal surgery 5/26/21 c/b small/moderate post operative pericardial effusion, a fib on eliquis, CHF, HTN, who presents with cough    Per chart review, patient was seen at [facility] on 1/29/21 for hypertrophic cardiomyopathy and atrial fibrillation. Patient had cardiac testing performed including cardiac rhythm monitor, TTE, cardiac MRI, and ECHO. All cardiac testing showed results consistent with hypertrophic cardiomyopathy. On 5/26/21, patient was admitted to [facility] for cardiomyopathy obstructive hypertrophic (HCC). She received a septal myectomy surgical procedure. She was on 4L per nasal cannula during activity. Patient was started on metoprolol, 7 day Lasix treatment, and Eliquis. Patient was discharged home with instructions to follow up with [facility] surgery, PCP, and primary cardiologist. Continues to follow with cardiology (Dr. Arnold at [place]).     Recently patients daughter has been sick with cold-like symptoms and rhinorrhea. Since 7/21/21 (2 days ago), patient has been feeling sick with fatigue, rhinorrhea, and a cough. She also endorses chills, but no known fever. Patient has mild shortness of  breath and attributes this to nasal congestion. She is currently taking Lasix and Eliquis as prescribed. Patient denies chest pain, nausea, vomiting, diarrhea, abdominal pain, lower extremity edema, leg pain, or any other additional symptoms at this time.     REVIEW OF SYSTEMS   Review of Systems   Constitutional: Positive for chills and fatigue. Negative for fever.   HENT: Positive for rhinorrhea.    Respiratory: Positive for cough and shortness of breath (secondary to nasal congestion).    Cardiovascular: Negative for chest pain and leg swelling.   Gastrointestinal: Negative for abdominal pain, diarrhea, nausea and vomiting.   Musculoskeletal:        Negative for leg pain.   All other systems reviewed and are negative.       PAST MEDICAL HISTORY:  Past Medical History:   Diagnosis Date     Hypertrophic cardiomyopathy (H) 1/15/2021    Severe outflow tract obstruction Preserved LV systolic performance Basal septum: 20 mm Gadolinium enhancement positive: Basal septum No major positive and no other minor positive risk factors identified.     Paroxysmal atrial fibrillation (H) 1/15/2021    Dx Nov 2020 (NAIF) [id number] score = 3 Rx apixaban     Parsonage-Mcknight syndrome        PAST SURGICAL HISTORY:  Past Surgical History:   Procedure Laterality Date     C REMOVAL OF OVARY(S)      Description: Oophorectomy;  Recorded: 06/26/2013;     EP LOOP RECORDER IMPLANT N/A 1/29/2021    Procedure: EP Loop Recorder Insertion;  Surgeon: Paul Garza MD;  Location: [facility];  Service: Cardiology     EXCISE BREAST CYST/FIBROADENOMA/TUMOR/DUCT LESION/NIPPLE LESION/AREOLAR LESION      Description: Breast Surgery Lumpectomy;  Recorded: 03/07/2012;  Comments: adenoma     HC EXCISION NASAL POLYP(S), EXTENSIVE      Description: Extensive Excision Of Nasal Polyps;  Recorded: 03/07/2012;      KNEE SCOPE, DIAGNOSTIC      Description: Arthroscopy Knee Left;  Recorded: 03/07/2012;     CO EXCIS TENDON SHEATH LESION,  HAND/FINGER      Description: Hand Excision Of A Tendon Cyst;  Recorded: 03/07/2012;     US GUIDED NEEDLE PLACEMENT  7/20/2020       CURRENT MEDICATIONS:    No current facility-administered medications for this encounter.    Current Outpatient Medications:      apixaban ANTICOAGULANT (ELIQUIS) 5 MG tablet, Take 5 mg by mouth 2 times daily, Disp: , Rfl:      calcium carbonate-vitamin D (OSCAL W/D) 500-200 MG-UNIT tablet, Take 2 tablets by mouth, Disp: , Rfl:      Cetaphil Moisturizing (CETAPHIL) external lotion, Apply 1 Application topically daily, Disp: , Rfl:      cycloSPORINE (RESTASIS) 0.05 % ophthalmic emulsion, 1 drop, Disp: , Rfl:      furosemide (LASIX) 40 MG tablet, Take 40 mg by mouth daily, Disp: , Rfl:      metoprolol tartrate (LOPRESSOR) 50 MG tablet, Take 50 mg by mouth 2 times daily, Disp: , Rfl:      Omega-3 Fatty Acids (RA FISH OIL) 1000 MG CAPS, 2 capsules in the morning and 1 capsule at night, Disp: , Rfl:      potassium chloride ER (KLOR-CON M) 20 MEQ CR tablet, Take 20 mEq by mouth daily, Disp: , Rfl:      sodium chloride (OCEAN) 0.65 % nasal spray, 1 spray, Disp: , Rfl:      SUMAtriptan (IMITREX) 50 MG tablet, Take 50 mg by mouth as needed, Disp: , Rfl:      Turmeric Curcumin 500 MG CAPS, Take 1 tablet by mouth daily, Disp: , Rfl:     ALLERGIES:  Allergies   Allergen Reactions     Shellfish Allergy Shortness Of Breath     Midazolam Hives     Welts     Acetaminophen      Dust Mites Other (See Comments)     Stuffy nose       FAMILY HISTORY:  Family History   Problem Relation Age of Onset     Pacemaker Father      Coronary Artery Disease Father 65.00     Colon Cancer Father         XRT and chemo     Heart Failure Father 79.00     Cerebrovascular Disease Mother 89.00     Hypertension Mother      Endometrial Cancer Mother      Other - See Comments Mother         Scarlet fever vs rheumatic fever     Other - See Comments Cousin 16.00        Drown, Hypertrophic (apparently via father - not realted)      Cardiomyopathy Cousin         Hypertrophic (apparently via father - not realted)     Cardiomyopathy Child         Hypertrophic (apparently via father - not realted)     Sudden Death No family hx of        SOCIAL HISTORY:   Social History     Socioeconomic History     Marital status:      Spouse name: Not on file     Number of children: Not on file     Years of education: Not on file     Highest education level: Not on file   Occupational History     Not on file   Tobacco Use     Smoking status: Never Smoker     Smokeless tobacco: Never Used   Substance and Sexual Activity     Alcohol use: Never     Drug use: Never     Sexual activity: Not on file   Other Topics Concern     Not on file   Social History Narrative     Not on file     Social Determinants of Health     Financial Resource Strain:      Difficulty of Paying Living Expenses:    Food Insecurity:      Worried About Running Out of Food in the Last Year:      Ran Out of Food in the Last Year:    Transportation Needs:      Lack of Transportation (Medical):      Lack of Transportation (Non-Medical):    Physical Activity:      Days of Exercise per Week:      Minutes of Exercise per Session:    Stress:      Feeling of Stress :    Social Connections:      Frequency of Communication with Friends and Family:      Frequency of Social Gatherings with Friends and Family:      Attends Temple Services:      Active Member of Clubs or Organizations:      Attends Club or Organization Meetings:      Marital Status:    Intimate Partner Violence:      Fear of Current or Ex-Partner:      Emotionally Abused:      Physically Abused:      Sexually Abused:        PHYSICAL EXAM:    Vitals: BP (!) 146/81   Pulse 69   Temp 98.1  F (36.7  C) (Oral)   Resp 26   Wt 81.2 kg (179 lb)   SpO2 93%   BMI 31.71 kg/m     Constitutional: Well developed, well nourished. Comfortable appearing.  HENT: Normocephalic, atraumatic, mucous membranes moist, nose normal. Neck- Supple, gross  ROM intact.   Eyes: Pupils mid-range, sclera white, no discharge  Respiratory: Clear to auscultation bilaterally, no respiratory distress, no wheezing, speaks full sentences easily.  Cardiovascular: Normal heart rate, regular rhythm. No lower extremity edema  GI: Soft, no tenderness to deep palpation in all quadrants, no masses.  Musculoskeletal: Moving all 4 extremities intentionally and without pain. No obvious deformity.  Skin: Warm, dry, no rash.  Neurologic: Alert & oriented x 3, speech clear, moving all extremities spontaneously   Psychiatric: Affect normal, cooperative.     LAB:  All pertinent labs reviewed and interpreted.  Labs Ordered and Resulted from Time of ED Arrival Up to the Time of Departure from the ED   CBC WITH PLATELETS - Abnormal; Notable for the following components:       Result Value    MCHC 30.8 (*)     All other components within normal limits   BASIC METABOLIC PANEL - Abnormal; Notable for the following components:    Potassium 3.4 (*)     All other components within normal limits   B-TYPE NATRIURETIC PEPTIDE ( EAST ONLY) - Abnormal; Notable for the following components:     (*)     All other components within normal limits   TROPONIN I - Normal   MAGNESIUM - Normal   SARS-COV2 (COVID-19) VIRUS RT-PCR - Normal    Narrative:     Testing was performed using the shmuel  SARS-CoV-2 & Influenza A/B Assay on the shmuel  Garima  System.  This test should be ordered for the detection of SARS-COV-2 in individuals who meet SARS-CoV-2 clinical and/or epidemiological criteria. Test performance is unknown in asymptomatic patients.  This test is for in vitro diagnostic use under the FDA EUA for laboratories certified under CLIA to perform moderate and/or high complexity testing. This test has not been FDA cleared or approved.  A negative test does not rule out the presence of PCR inhibitors in the specimen or target RNA in concentration below the limit of detection for the assay. The possibility  of a false negative should be considered if the patient's recent exposure or clinical presentation suggests COVID-19.  [facility] are certified under the Clinical Laboratory Improvement Amendments of 1988 (CLIA-88) as qualified to perform moderate and/or high complexity laboratory testing.   MAY SALINE LOCK IV   CALL   COVID-19 VIRUS (CORONAVIRUS) BY PCR    Narrative:     The following orders were created for panel order Symptomatic COVID-19 Virus (Coronavirus) by PCR Nasopharyngeal.  Procedure                               Abnormality         Status                     ---------                               -----------         ------                     SARS-COV2 (COVID-19) Vir...[[id number]]  Normal              Final result                 Please view results for these tests on the individual orders.       RADIOLOGY:  Chest XR,  PA & LAT   Final Result   IMPRESSION: Previous sternotomy. Mild cardiomegaly with normal vascularity. Elevated right hemidiaphragm with right basilar compressive atelectasis. Left lung is clear. No pleural effusion.          EKG:   Performed at: 1019  Impression: Sinus rhythm with 1st degree A-V block with fusion complexes, left axis deviation, left bundle branch block, abnormal ECG  Rate: 67  Rhythm: Sinus  QRS Interval: 176  QTc Interval: 528  Comparison: None in our record  I have independently reviewed and interpreted the EKG(s) documented above.     PROCEDURES:   Procedures       I, Barbara Hernandez, am serving as a scribe to document services personally performed by Dr. Lynn Pierce based on my observation and the provider's statements to me. I, Lynn Pierce MD attest that Barbara Hernandez is acting in a scribe capacity, has observed my performance of the services and has documented them in accordance with my direction.      Lynn Pierce M.D.  Emergency Medicine  [facility] EMERGENCY DEPARTMENT  [address]  [facility] [address]  [phone]  Dept: [phone]     Lynn Pierce MD  07/23/21 1414       Lynn Pierce MD  07/23/21 1528

## 2021-07-23 NOTE — ED TRIAGE NOTES
Patient presents here for evaluation of multiple symptoms, including upper respiratory congestions, chills, fatigue and a cough over the past 48 hours.

## 2021-07-23 NOTE — DISCHARGE INSTRUCTIONS
Your covid swab was negative. Chest xray did not show signs of pneumonia  I discussed results with your cardiologist as well. Please follow up with your clinic doctor within next couple of days to make sure you are feeling better  Please return to the Emergency Department if you have chest pain, increased shortness of breath, worsening fevers or any other concerns or worsening symptoms.

## 2021-07-23 NOTE — PROGRESS NOTES
"Romy is a 71 year old who is being evaluated via a billable telephone visit.      What phone number would you like to be contacted at? [phone]  How would you like to obtain your AVS? [facility]    Assessment & Plan   Problem List Items Addressed This Visit     None      Visit Diagnoses     SOB (shortness of breath)    -  Primary      Given patient's recent cardiac history, recent surgery and current symptoms recommend patient be seen in the ED she will go to [facility] ED at this time.       BMI:   Estimated body mass index is 32.88 kg/m  as calculated from the following:    Height as of 7/9/21: 1.6 m (5' 3\").    Weight as of 7/9/21: 84.2 kg (185 lb 9.6 oz).     No follow-ups on file.    Diana Coello NP  [facility]   Romy is a 71 year old who presents for the following health issues     HPI patient has a diagnosis of hypertrophic cardiomyopathy status post myomectomy with surgery performed at the [facility] on 5/26/2021.  Symptoms of orthopnea, paroxysmal nocturnal dyspnea and leg edema with abdominal distention with recommendation to follow a salt restriction, elevate legs and Lasix 40 mg daily.  Patient has known history of stage II chronic kidney disease recommend continued monitoring as well as BNP.  Patient noted to have paroxysmal atrial fibrillation.  Postop myomectomy for hypertrophic cardiomyopathy or asymmetrical septal hypertrophy not pacemaker has been implanted yet reemphasized salt restriction and diet at her last clinic visit.  During clinic visit it was noted that patient had edema bilateral lower extremities neck veins are slightly distended few scattered dry rales noted that she appeared puffy and swollen oxygenating at 93% on room air.    Patient was seen by cardiology on 6/30/2021 with recommendation for echocardiogram to reevaluate LVOT gradient and pericardial effusion with symptoms, continue on Lasix, resume potassium supplement to discuss " "possible need for thoracentesis or.  Cardiocentesis based on chest x-ray and echo discontinue the amiodarone and possible discontinuation of Eliquis at a later date.      Feeling better and stronger this week.  She states she did 54 trips up and down the driveway.  She was referred to Rehab but felt she was doing well and this was discontinued.  She continues to have a nurse come to the home.  She has moved from the chair to the bed and found that the lower extremity edema resolved with elevation.      She is taking Lasix 40mg daily as she states she was up 7 pounds and was \"struggling to breathe\".     Wt at home 181.5 today and yesterday.  June 29th 186     She completed echo on 7.5.2021 which indicated pleural and pericardial effusions which had improved from when she had surgery.      Patient was seen in clinic on 7/9/2021 she noted significant provement since increasing Lasix as well as following low-sodium diet and daily weights she is increasing her physical activity not 40 minutes.  She presents to telephone visit for follow-up though reports she has some increased shortness of breath with cough concern for Covid.  She states that she does not have an O2 monitor available to her at this time though early yesterday her O2 sats are at 94%.      Review of Systems   Constitutional, HEENT, cardiovascular, pulmonary, GI, , musculoskeletal, neuro, skin, endocrine and psych systems are negative, except as otherwise noted.      Objective    Vitals - Patient Reported  Systolic (Patient Reported): 120  Diastolic (Patient Reported): 80  Weight (Patient Reported): 81.2 kg (179 lb)  Height (Patient Reported): 160 cm (5' 3\")  BMI (Based on Pt Reported Ht/Wt): 31.71      Vitals:  No vitals were obtained today due to virtual visit.    Physical Exam     PSYCH: Alert and oriented times 3; coherent speech, normal   rate and volume, able to articulate logical thoughts, able   to abstract reason, no tangential thoughts, no " hallucinations   or delusions  Her affect is normal  RESP: No audible wheezing is noted though patient is unable to complete full sentences which is new.  Remainder of exam unable to be completed due to telephone visits                Phone call duration: 4 minutes

## 2021-07-26 ENCOUNTER — OFFICE VISIT (OUTPATIENT)
Dept: CARDIOLOGY | Facility: CLINIC | Age: 71
End: 2021-07-26
Payer: MEDICARE

## 2021-07-26 VITALS
BODY MASS INDEX: 31.89 KG/M2 | SYSTOLIC BLOOD PRESSURE: 130 MMHG | DIASTOLIC BLOOD PRESSURE: 80 MMHG | HEART RATE: 69 BPM | HEIGHT: 63 IN | WEIGHT: 180 LBS | RESPIRATION RATE: 14 BRPM

## 2021-07-26 DIAGNOSIS — I10 ESSENTIAL HYPERTENSION: ICD-10-CM

## 2021-07-26 DIAGNOSIS — I42.2 HYPERTROPHIC CARDIOMYOPATHY (H): ICD-10-CM

## 2021-07-26 DIAGNOSIS — I50.32 CHRONIC DIASTOLIC CONGESTIVE HEART FAILURE (H): Primary | ICD-10-CM

## 2021-07-26 DIAGNOSIS — I48.0 PAROXYSMAL ATRIAL FIBRILLATION (H): ICD-10-CM

## 2021-07-26 PROCEDURE — 99214 OFFICE O/P EST MOD 30 MIN: CPT | Performed by: INTERNAL MEDICINE

## 2021-07-26 ASSESSMENT — MIFFLIN-ST. JEOR: SCORE: 1310.6

## 2021-07-26 NOTE — PATIENT INSTRUCTIONS
It was a pleasure to meet with you today.      Below is a summary of your visit.   1. You can continue to take the furosemide daily or try taking it every other day. As long as you are not retaining water, you can titrate the furosemide to effect.  2. Continue other medications without changes  3. Follow up with me in 6 months or sooner if needed.     Please do not hesitate to call the [facility] clinic with any questions or concerns at [phone]. You can also reach my nurse, Jeana, during normal business hours at [phone].    Sincerely,

## 2021-07-26 NOTE — PROGRESS NOTES
Thank you, Diana Mahoney, for asking the [facility] team to see Ms. Romy Izquierdo in cardiology Follow Up       Assessment/Recommendations   Assessment:    1. Hypertrophic cardiomyopathy with dynamic LVOT gradient -  Now s/p septal myectomy at [facility] on 5/26/21. Stable without significant gradient.  2. Hypertension - stable.  3. Paroxysmal atrial fibrillation - with RVR - s/p surgical pulmonary vein isolation  4. 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  5. Mild coronary artery disease with stenosis of 20-30% in all major coronary arteries - stable without angina.  6. Parsonage-monet syndrome with paralyzed right hemidiaphragm  7. Acute on chronic kidney disease stage II  8. chronic congestive heart failure with preserved LVEF - currently compensated    Plan:  1. Continue medications as prescribed, but can decrease frequency of furosemide dosing provided she monitors closely for water retention.  2. Follow up with me in 6 months or sooner if needed.         History of Present Illness   Ms. Romy Izquierdo is a 71 year old female with a significant past history of hypertension who presents for follow-up of HOCM and atrial fibrillation.      has hypertrophic obstructive cardiomyopathy with a prior echo performed on 8/20/2020 that demonstrated progression of the LVOT gradient, peaking at 100 mmHg, which is increased from 40 mmHg a year ago. This does not result in significant mitral valve regurgitation but asymmetric septal hypertrophy was noted. She has had one episode of syncope in her lifetime that was attributed to Parsonage-Monet syndrome. A cardiac MRI confirmed the diagnosis of HOCM. With an LVOT peak gradient of 80 mmHg and paroxysmal atrial fibrillation she was referred for septal myectomy. Her surgery occurred on 5/26/21 and consisted of a septal myectomy with pulmonary vein isolation and left atrial appendage excision. Her post-operative  course was notable for paroxysmal atrial fibrillation treated with amiodarone for several weeks.    About a month ago we increased diuretic therapy for decompensated heart failure and she was in the ER late last week for URI symptoms. Today, Ms. Izquierdo reports feeling generally well. She is much improved in symptoms compared to last Friday when she was in the ER. Weight has been stable and breathing is much improved from 1 month ago when I last saw her.      Other than noted above, Ms. Izquierdo denies any chest pain/pressure/tightness, shortness of breath at rest or with exertion, light headedness/dizziness, pre-syncope, syncope, lower extremity swelling, palpitations, paroxysmal nocturnal dyspnea (PND), or orthopnea.     Cardiac Problems and Cardiac Diagnostics     Most Recent Cardiac testing:  ECG dated 7/23/21 (personaly reviewed and interpreted): sinus rhythm with first degree AV Block. Left axis deviation and left bundle branch block.    Cardiac rhythm monitor 11/16/2020  CONCLUSION:  Symptoms of heart racing on 1 occasion associated with atrial flutter.   Symptoms of shortness of breath on 9 occasions, associated with sinus rhythm and  first-degree AV block. Paroxysmal atrial fibrillation was present on 2 days with a  total of 5 hours and 6 hours, respectively with average ventricular response of  approximately 85 beats per minute and peak ventricular response of approximately 110  beats per minute.  A single short run of accelerated idioventricular rhythm was  noted on auto transmissions.  Rather marked nocturnal sinus bradycardia with heart  rates in the 30s were noted on multiple days.    ECHO (report reviewed):   TTE 7/1/21    Normal left ventricular size with severe hypertrophy.    Left ventricle ejection fraction is normal. The calculated left ventricular ejection fraction is 64%.    Normal right ventricular size and systolic function.    Systolic anterior motion of the anterior leaflet of the mitral  valve is noted without significant mitral regurgitation.    The peak gradient across the LVOT and aortic valve is 11 mmHg without evidence of dynamic LVOT obstruction.    A left pleural effusion and trivial pericardial effusion are noted.    When compared to the previous study dated 1/7/2021, a dynamic LVOT obstruction is no longer present. The peak outflow tract gradient has reduced from 81 to 11 mmHg. The pleural and pericardial effusions are new.    Cardiac MRI 9/21/2020  IMPRESSIONS:    1.  Normal left ventricular size, thickened septum with maximal measurement 18-20 mm, inferolateral wall 7-8 mm. The quantified left ventricular ejection fraction is 69%. It appears less deformation of thickened septum per tagging images. There is no rest perfusion defect. It is evident that there is LVOT obstruction by flow turbulence and EMELYN.  The late delayed gadolinium enhancement study demonstrated that there are patchy gadolinium enhancement in thickened basal septal segment and also in basal lateral segment. Put all together, the findings are consistent with hypertrophic cardiomyopathy.   2.  Normal right ventricular size function.    3.  Mildly enlarged both atria.  4.  Moderate mitral valve regurgitation.  5.  Mildly dilated mid ascending aorta 40 mm.    Cardiac cath: from 5/25/[facility] demonstrated   1.  CORONARY ANGIOGRAPHY   FINAL DIAGNOSIS   1.  Mild coronary artery atherosclerosis   PRE-PROCEDURE DIAGNOSIS   1.  Cardiomyopathy Hypertrophic (HCC)   CORONARY DIAGNOSTIC SUMMARY   Coronary artery dominance is right. Normal right coronary.   The left main coronary artery is 30% obstructed by a discrete lesion and 20% obstructed by a discrete lesion.   The proximal left anterior descending artery is 20% obstructed by a discrete lesion.   The middle left anterior descending artery is 20% obstructed by a tubular lesion.   The distal left anterior descending artery is 20% obstructed by a discrete lesion.   Noted  "systolic compression of septal  arteries, likely consistent with patient's known hypertrophic cardiomyopathy.          Medications  Allergies   Current Outpatient Medications   Medication Sig Dispense Refill     apixaban ANTICOAGULANT (ELIQUIS) 5 MG tablet Take 5 mg by mouth 2 times daily       calcium carbonate-vitamin D (OSCAL W/D) 500-200 MG-UNIT tablet Take 2 tablets by mouth       Cetaphil Moisturizing (CETAPHIL) external lotion Apply 1 Application topically daily       cycloSPORINE (RESTASIS) 0.05 % ophthalmic emulsion 1 drop       furosemide (LASIX) 40 MG tablet Take 40 mg by mouth daily       metoprolol tartrate (LOPRESSOR) 50 MG tablet Take 50 mg by mouth 2 times daily       Omega-3 Fatty Acids (RA FISH OIL) 1000 MG CAPS 2 capsules in the morning and 1 capsule at night       potassium chloride ER (KLOR-CON M) 20 MEQ CR tablet Take 20 mEq by mouth daily       sodium chloride (OCEAN) 0.65 % nasal spray 1 spray       SUMAtriptan (IMITREX) 50 MG tablet Take 50 mg by mouth as needed       Turmeric Curcumin 500 MG CAPS Take 1 tablet by mouth daily        Allergies   Allergen Reactions     Shellfish Allergy Shortness Of Breath     Midazolam Hives     Welts     Acetaminophen      Dust Mites Other (See Comments)     Stuffy nose        Physical Examination Review of Systems   Vitals: /80 (BP Location: Left arm, Patient Position: Sitting, Cuff Size: Adult Large)   Pulse 69   Resp 14   Ht 1.6 m (5' 3\")   Wt 81.6 kg (180 lb)   BMI 31.89 kg/m    BMI= Body mass index is 31.89 kg/m .  Wt Readings from Last 3 Encounters:   07/26/21 81.6 kg (180 lb)   07/23/21 81.2 kg (179 lb)   07/09/21 84.2 kg (185 lb 9.6 oz)       General Appearance:   Pleasant female, appears stated age. no acute distress, overweight body habitus   ENT/Mouth: membranes moist, no apparent gingival bleeding.      EYES:  no scleral icterus, normal conjunctivae   Neck: supple   Respiratory:   lungs are clear to auscultation, no rales or " wheezing, well-healed sternal scar, equal chest wall expansion    Cardiovascular:   Regular rhythm, normal rate. Normal first and second heart sounds with no murmurs, rubs, or gallops; Jugular venous pressure normal, no edema bilaterally    Abdomen/GI:  Soft, non-tender   Extremities: no cyanosis or clubbing   Skin: no xanthelasma, warm.    Heme/lymph/ Immunology No apparent bleeding noted.   Neurologic: Alert and oriented. no tremors   Psychiatric: Pleasant, calm, appropriate affect.         Please refer above for cardiac ROS details.       Past History   Past Medical History:   Past Medical History:   Diagnosis Date     Hypertrophic cardiomyopathy (H) 1/15/2021    Severe outflow tract obstruction Preserved LV systolic performance Basal septum: 20 mm Gadolinium enhancement positive: Basal septum No major positive and no other minor positive risk factors identified.     Paroxysmal atrial fibrillation (H) 1/15/2021    Dx Nov 2020 (NAIF) [id number] score = 3 Rx apixaban     Parsonage-Mcknight syndrome         Past Surgical History:   Past Surgical History:   Procedure Laterality Date     C REMOVAL OF OVARY(S)      Description: Oophorectomy;  Recorded: 06/26/2013;     EP LOOP RECORDER IMPLANT N/A 1/29/2021    Procedure: EP Loop Recorder Insertion;  Surgeon: Paul Garza MD;  Location: [facility];  Service: Cardiology     EXCISE BREAST CYST/FIBROADENOMA/TUMOR/DUCT LESION/NIPPLE LESION/AREOLAR LESION      Description: Breast Surgery Lumpectomy;  Recorded: 03/07/2012;  Comments: adenoma     HC EXCISION NASAL POLYP(S), EXTENSIVE      Description: Extensive Excision Of Nasal Polyps;  Recorded: 03/07/2012;     HC KNEE SCOPE, DIAGNOSTIC      Description: Arthroscopy Knee Left;  Recorded: 03/07/2012;     AR EXCIS TENDON SHEATH LESION, HAND/FINGER      Description: Hand Excision Of A Tendon Cyst;  Recorded: 03/07/2012;     US GUIDED NEEDLE PLACEMENT  7/20/2020        Family History:   Family History   Problem  Relation Age of Onset     Pacemaker Father      Coronary Artery Disease Father 65.00     Colon Cancer Father         XRT and chemo     Heart Failure Father 79.00     Cerebrovascular Disease Mother 89.00     Hypertension Mother      Endometrial Cancer Mother      Other - See Comments Mother         Scarlet fever vs rheumatic fever     Other - See Comments Cousin 16.00        Drown, Hypertrophic (apparently via father - not realted)     Cardiomyopathy Cousin         Hypertrophic (apparently via father - not realted)     Cardiomyopathy Child         Hypertrophic (apparently via father - not realted)     Sudden Death No family hx of         Social History:   Social History     Socioeconomic History     Marital status:      Spouse name: Not on file     Number of children: Not on file     Years of education: Not on file     Highest education level: Not on file   Occupational History     Not on file   Tobacco Use     Smoking status: Never Smoker     Smokeless tobacco: Never Used   Substance and Sexual Activity     Alcohol use: Never     Drug use: Never     Sexual activity: Not on file   Other Topics Concern     Not on file   Social History Narrative     Not on file     Social Determinants of Health     Financial Resource Strain:      Difficulty of Paying Living Expenses:    Food Insecurity:      Worried About Running Out of Food in the Last Year:      Ran Out of Food in the Last Year:    Transportation Needs:      Lack of Transportation (Medical):      Lack of Transportation (Non-Medical):    Physical Activity:      Days of Exercise per Week:      Minutes of Exercise per Session:    Stress:      Feeling of Stress :    Social Connections:      Frequency of Communication with Friends and Family:      Frequency of Social Gatherings with Friends and Family:      Attends [employer] Services:      Active Member of Clubs or Organizations:      Attends Club or Organization Meetings:      Marital Status:    Intimate Partner  Violence:      Fear of Current or Ex-Partner:      Emotionally Abused:      Physically Abused:      Sexually Abused:               Lab Results    Chemistry/lipid CBC Cardiac Enzymes/BNP/TSH/INR   Recent Labs   Lab Test 07/29/20  1329   CHOL 225*   HDL 64   *   TRIG 109     Recent Labs   Lab Test 07/29/20  1329 05/31/19  0918   * 127     Recent Labs   Lab Test 07/23/21  1009      POTASSIUM 3.4*   CHLORIDE 106   CO2 30   GLC 99   BUN 21   CR 0.87   GFRESTIMATED 68   EMMANUEL 9.5     Recent Labs   Lab Test 07/23/21  1009 06/30/21  1201 06/10/21  1533   CR 0.87 0.79 1.23*     No results for input(s): A1C in the last 81404 hours. Recent Labs   Lab Test 07/23/21  1009   WBC 8.0   HGB 12.4   HCT 40.3   MCV 88        Recent Labs   Lab Test 07/23/21  1009 06/10/21  1533 05/27/20  0917   HGB 12.4 9.5* 14.4    Recent Labs   Lab Test 07/23/21  1009   TROPONINI 0.03     Recent Labs   Lab Test 07/23/21  1009   *     Recent Labs   Lab Test 05/27/20  0917   TSH 2.48     No results for input(s): INR in the last 14172 hours.

## 2021-07-26 NOTE — LETTER
7/26/2021    Diana Coello, CLIVE  [facility] [address] [facility] [address]    RE: Romy Izquierdo       Dear Colleague,    I had the pleasure of seeing Romy Izquierdo in the [facility].        Thank you, Diana Mahoney, for asking the [facility] team to see Ms. Romy Izquierdo in cardiology Follow Up       Assessment/Recommendations   Assessment:    1. Hypertrophic cardiomyopathy with dynamic LVOT gradient -  Now s/p septal myectomy at [facility] on 5/26/21. Stable without significant gradient.  2. Hypertension - stable.  3. Paroxysmal atrial fibrillation - with RVR - s/p surgical pulmonary vein isolation  4. 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  5. Mild coronary artery disease with stenosis of 20-30% in all major coronary arteries - stable without angina.  6. Parsonage-monet syndrome with paralyzed right hemidiaphragm  7. Acute on chronic kidney disease stage II  8. chronic congestive heart failure with preserved LVEF - currently compensated    Plan:  1. Continue medications as prescribed, but can decrease frequency of furosemide dosing provided she monitors closely for water retention.  2. Follow up with me in 6 months or sooner if needed.         History of Present Illness   Ms. Romy Izquierdo is a 71 year old female with a significant past history of hypertension who presents for follow-up of HOCM and atrial fibrillation.      has hypertrophic obstructive cardiomyopathy with a prior echo performed on 8/20/2020 that demonstrated progression of the LVOT gradient, peaking at 100 mmHg, which is increased from 40 mmHg a year ago. This does not result in significant mitral valve regurgitation but asymmetric septal hypertrophy was noted. She has had one episode of syncope in her lifetime that was attributed to Parsonage-Monet syndrome. A cardiac MRI confirmed the diagnosis  of HOCM. With an LVOT peak gradient of 80 mmHg and paroxysmal atrial fibrillation she was referred for septal myectomy. Her surgery occurred on 5/26/21 and consisted of a septal myectomy with pulmonary vein isolation and left atrial appendage excision. Her post-operative course was notable for paroxysmal atrial fibrillation treated with amiodarone for several weeks.    About a month ago we increased diuretic therapy for decompensated heart failure and she was in the ER late last week for URI symptoms. Today, Ms. Izquierdo reports feeling generally well. She is much improved in symptoms compared to last Friday when she was in the ER. Weight has been stable and breathing is much improved from 1 month ago when I last saw her.      Other than noted above, Ms. Izquierdo denies any chest pain/pressure/tightness, shortness of breath at rest or with exertion, light headedness/dizziness, pre-syncope, syncope, lower extremity swelling, palpitations, paroxysmal nocturnal dyspnea (PND), or orthopnea.     Cardiac Problems and Cardiac Diagnostics     Most Recent Cardiac testing:  ECG dated 7/23/21 (personaly reviewed and interpreted): sinus rhythm with first degree AV Block. Left axis deviation and left bundle branch block.    Cardiac rhythm monitor 11/16/2020  CONCLUSION:  Symptoms of heart racing on 1 occasion associated with atrial flutter.   Symptoms of shortness of breath on 9 occasions, associated with sinus rhythm and  first-degree AV block. Paroxysmal atrial fibrillation was present on 2 days with a  total of 5 hours and 6 hours, respectively with average ventricular response of  approximately 85 beats per minute and peak ventricular response of approximately 110  beats per minute.  A single short run of accelerated idioventricular rhythm was  noted on auto transmissions.  Rather marked nocturnal sinus bradycardia with heart  rates in the 30s were noted on multiple days.    ECHO (report reviewed):   TTE 7/1/21    Normal left  ventricular size with severe hypertrophy.    Left ventricle ejection fraction is normal. The calculated left ventricular ejection fraction is 64%.    Normal right ventricular size and systolic function.    Systolic anterior motion of the anterior leaflet of the mitral valve is noted without significant mitral regurgitation.    The peak gradient across the LVOT and aortic valve is 11 mmHg without evidence of dynamic LVOT obstruction.    A left pleural effusion and trivial pericardial effusion are noted.    When compared to the previous study dated 1/7/2021, a dynamic LVOT obstruction is no longer present. The peak outflow tract gradient has reduced from 81 to 11 mmHg. The pleural and pericardial effusions are new.    Cardiac MRI 9/21/2020  IMPRESSIONS:    1.  Normal left ventricular size, thickened septum with maximal measurement 18-20 mm, inferolateral wall 7-8 mm. The quantified left ventricular ejection fraction is 69%. It appears less deformation of thickened septum per tagging images. There is no rest perfusion defect. It is evident that there is LVOT obstruction by flow turbulence and EMELYN.  The late delayed gadolinium enhancement study demonstrated that there are patchy gadolinium enhancement in thickened basal septal segment and also in basal lateral segment. Put all together, the findings are consistent with hypertrophic cardiomyopathy.   2.  Normal right ventricular size function.    3.  Mildly enlarged both atria.  4.  Moderate mitral valve regurgitation.  5.  Mildly dilated mid ascending aorta 40 mm.    Cardiac cath: from 5/25/21 at [facility] demonstrated   1.  CORONARY ANGIOGRAPHY   FINAL DIAGNOSIS   1.  Mild coronary artery atherosclerosis   PRE-PROCEDURE DIAGNOSIS   1.  Cardiomyopathy Hypertrophic (HCC)   CORONARY DIAGNOSTIC SUMMARY   Coronary artery dominance is right. Normal right coronary.   The left main coronary artery is 30% obstructed by a discrete lesion and 20% obstructed by a discrete  "lesion.   The proximal left anterior descending artery is 20% obstructed by a discrete lesion.   The middle left anterior descending artery is 20% obstructed by a tubular lesion.   The distal left anterior descending artery is 20% obstructed by a discrete lesion.   Noted systolic compression of septal  arteries, likely consistent with patient's known hypertrophic cardiomyopathy.          Medications  Allergies   Current Outpatient Medications   Medication Sig Dispense Refill     apixaban ANTICOAGULANT (ELIQUIS) 5 MG tablet Take 5 mg by mouth 2 times daily       calcium carbonate-vitamin D (OSCAL W/D) 500-200 MG-UNIT tablet Take 2 tablets by mouth       Cetaphil Moisturizing (CETAPHIL) external lotion Apply 1 Application topically daily       cycloSPORINE (RESTASIS) 0.05 % ophthalmic emulsion 1 drop       furosemide (LASIX) 40 MG tablet Take 40 mg by mouth daily       metoprolol tartrate (LOPRESSOR) 50 MG tablet Take 50 mg by mouth 2 times daily       Omega-3 Fatty Acids (RA FISH OIL) 1000 MG CAPS 2 capsules in the morning and 1 capsule at night       potassium chloride ER (KLOR-CON M) 20 MEQ CR tablet Take 20 mEq by mouth daily       sodium chloride (OCEAN) 0.65 % nasal spray 1 spray       SUMAtriptan (IMITREX) 50 MG tablet Take 50 mg by mouth as needed       Turmeric Curcumin 500 MG CAPS Take 1 tablet by mouth daily        Allergies   Allergen Reactions     Shellfish Allergy Shortness Of Breath     Midazolam Hives     Welts     Acetaminophen      Dust Mites Other (See Comments)     Stuffy nose        Physical Examination Review of Systems   Vitals: /80 (BP Location: Left arm, Patient Position: Sitting, Cuff Size: Adult Large)   Pulse 69   Resp 14   Ht 1.6 m (5' 3\")   Wt 81.6 kg (180 lb)   BMI 31.89 kg/m    BMI= Body mass index is 31.89 kg/m .  Wt Readings from Last 3 Encounters:   07/26/21 81.6 kg (180 lb)   07/23/21 81.2 kg (179 lb)   07/09/21 84.2 kg (185 lb 9.6 oz)       General Appearance:  "  Pleasant female, appears stated age. no acute distress, overweight body habitus   ENT/Mouth: membranes moist, no apparent gingival bleeding.      EYES:  no scleral icterus, normal conjunctivae   Neck: supple   Respiratory:   lungs are clear to auscultation, no rales or wheezing, well-healed sternal scar, equal chest wall expansion    Cardiovascular:   Regular rhythm, normal rate. Normal first and second heart sounds with no murmurs, rubs, or gallops; Jugular venous pressure normal, no edema bilaterally    Abdomen/GI:  Soft, non-tender   Extremities: no cyanosis or clubbing   Skin: no xanthelasma, warm.    Heme/lymph/ Immunology No apparent bleeding noted.   Neurologic: Alert and oriented. no tremors   Psychiatric: Pleasant, calm, appropriate affect.         Please refer above for cardiac ROS details.       Past History   Past Medical History:   Past Medical History:   Diagnosis Date     Hypertrophic cardiomyopathy (H) 1/15/2021    Severe outflow tract obstruction Preserved LV systolic performance Basal septum: 20 mm Gadolinium enhancement positive: Basal septum No major positive and no other minor positive risk factors identified.     Paroxysmal atrial fibrillation (H) 1/15/2021    Dx Nov 2020 (NAIF) [id number] score = 3 Rx apixaban     Parsonage-Mcknight syndrome         Past Surgical History:   Past Surgical History:   Procedure Laterality Date     C REMOVAL OF OVARY(S)      Description: Oophorectomy;  Recorded: 06/26/2013;     EP LOOP RECORDER IMPLANT N/A 1/29/2021    Procedure: EP Loop Recorder Insertion;  Surgeon: Paul Garza MD;  Location: [facility] Cardiac Cath Lab;  Service: Cardiology     EXCISE BREAST CYST/FIBROADENOMA/TUMOR/DUCT LESION/NIPPLE LESION/AREOLAR LESION      Description: Breast Surgery Lumpectomy;  Recorded: 03/07/2012;  Comments: adenoma     HC EXCISION NASAL POLYP(S), EXTENSIVE      Description: Extensive Excision Of Nasal Polyps;  Recorded: 03/07/2012;     HC KNEE SCOPE, DIAGNOSTIC       Description: Arthroscopy Knee Left;  Recorded: 03/07/2012;     FL EXCIS TENDON SHEATH LESION, HAND/FINGER      Description: Hand Excision Of A Tendon Cyst;  Recorded: 03/07/2012;     US GUIDED NEEDLE PLACEMENT  7/20/2020        Family History:   Family History   Problem Relation Age of Onset     Pacemaker Father      Coronary Artery Disease Father 65.00     Colon Cancer Father         XRT and chemo     Heart Failure Father 79.00     Cerebrovascular Disease Mother 89.00     Hypertension Mother      Endometrial Cancer Mother      Other - See Comments Mother         Scarlet fever vs rheumatic fever     Other - See Comments Cousin 16.00        Drown, Hypertrophic (apparently via father - not realted)     Cardiomyopathy Cousin         Hypertrophic (apparently via father - not realted)     Cardiomyopathy Child         Hypertrophic (apparently via father - not realted)     Sudden Death No family hx of         Social History:   Social History     Socioeconomic History     Marital status:      Spouse name: Not on file     Number of children: Not on file     Years of education: Not on file     Highest education level: Not on file   Occupational History     Not on file   Tobacco Use     Smoking status: Never Smoker     Smokeless tobacco: Never Used   Substance and Sexual Activity     Alcohol use: Never     Drug use: Never     Sexual activity: Not on file   Other Topics Concern     Not on file   Social History Narrative     Not on file     Social Determinants of Health     Financial Resource Strain:      Difficulty of Paying Living Expenses:    Food Insecurity:      Worried About Running Out of Food in the Last Year:      Ran Out of Food in the Last Year:    Transportation Needs:      Lack of Transportation (Medical):      Lack of Transportation (Non-Medical):    Physical Activity:      Days of Exercise per Week:      Minutes of Exercise per Session:    Stress:      Feeling of Stress :    Social Connections:       Frequency of Communication with Friends and Family:      Frequency of Social Gatherings with Friends and Family:      Attends Taoism Services:      Active Member of Clubs or Organizations:      Attends Club or Organization Meetings:      Marital Status:    Intimate Partner Violence:      Fear of Current or Ex-Partner:      Emotionally Abused:      Physically Abused:      Sexually Abused:               Lab Results    Chemistry/lipid CBC Cardiac Enzymes/BNP/TSH/INR   Recent Labs   Lab Test 07/29/20  1329   CHOL 225*   HDL 64   *   TRIG 109     Recent Labs   Lab Test 07/29/20  1329 05/31/19  0918   * 127     Recent Labs   Lab Test 07/23/21  1009      POTASSIUM 3.4*   CHLORIDE 106   CO2 30   GLC 99   BUN 21   CR 0.87   GFRESTIMATED 68   EMMANUEL 9.5     Recent Labs   Lab Test 07/23/21  1009 06/30/21  1201 06/10/21  1533   CR 0.87 0.79 1.23*     No results for input(s): A1C in the last 45397 hours. Recent Labs   Lab Test 07/23/21  1009   WBC 8.0   HGB 12.4   HCT 40.3   MCV 88        Recent Labs   Lab Test 07/23/21  1009 06/10/21  1533 05/27/20  0917   HGB 12.4 9.5* 14.4    Recent Labs   Lab Test 07/23/21  1009   TROPONINI 0.03     Recent Labs   Lab Test 07/23/21  1009   *     Recent Labs   Lab Test 05/27/20  0917   TSH 2.48     No results for input(s): INR in the last 38589 hours.           Thank you for allowing me to participate in the care of your patient.      Sincerely,     Hossein Arnold MD     [facility]  cc:   No referring provider defined for this encounter.

## 2021-08-05 ENCOUNTER — TRANSFERRED RECORDS (OUTPATIENT)
Dept: HEALTH INFORMATION MANAGEMENT | Facility: CLINIC | Age: 71
End: 2021-08-05

## 2021-08-12 ENCOUNTER — TELEPHONE (OUTPATIENT)
Dept: INTERNAL MEDICINE | Facility: CLINIC | Age: 71
End: 2021-08-12

## 2021-08-12 ENCOUNTER — ANCILLARY PROCEDURE (OUTPATIENT)
Dept: CARDIOLOGY | Facility: CLINIC | Age: 71
End: 2021-08-12
Attending: INTERNAL MEDICINE
Payer: MEDICARE

## 2021-08-12 DIAGNOSIS — Z45.09 ENCOUNTER FOR LOOP RECORDER CHECK: ICD-10-CM

## 2021-08-12 LAB
MDC_IDC_MSMT_BATTERY_STATUS: NORMAL
MDC_IDC_PG_IMPLANT_DTM: NORMAL
MDC_IDC_PG_MFG: NORMAL
MDC_IDC_PG_MODEL: NORMAL
MDC_IDC_PG_SERIAL: NORMAL
MDC_IDC_PG_TYPE: NORMAL
MDC_IDC_SESS_CLINIC_NAME: NORMAL
MDC_IDC_SESS_DTM: NORMAL
MDC_IDC_SESS_TYPE: NORMAL
MDC_IDC_SET_ZONE_DETECTION_INTERVAL: 2000 MS
MDC_IDC_SET_ZONE_DETECTION_INTERVAL: 3000 MS
MDC_IDC_SET_ZONE_DETECTION_INTERVAL: 370 MS
MDC_IDC_SET_ZONE_TYPE: NORMAL
MDC_IDC_STAT_AT_BURDEN_PERCENT: 3.6 %
MDC_IDC_STAT_AT_DTM_END: NORMAL
MDC_IDC_STAT_AT_DTM_START: NORMAL
MDC_IDC_STAT_EPISODE_RECENT_COUNT: 0
MDC_IDC_STAT_EPISODE_RECENT_COUNT: 1
MDC_IDC_STAT_EPISODE_RECENT_COUNT: 32
MDC_IDC_STAT_EPISODE_RECENT_COUNT: 9
MDC_IDC_STAT_EPISODE_RECENT_COUNT_DTM_END: NORMAL
MDC_IDC_STAT_EPISODE_RECENT_COUNT_DTM_START: NORMAL
MDC_IDC_STAT_EPISODE_TOTAL_COUNT: 0
MDC_IDC_STAT_EPISODE_TOTAL_COUNT: 1
MDC_IDC_STAT_EPISODE_TOTAL_COUNT: 16
MDC_IDC_STAT_EPISODE_TOTAL_COUNT: 33
MDC_IDC_STAT_EPISODE_TOTAL_COUNT_DTM_END: NORMAL
MDC_IDC_STAT_EPISODE_TOTAL_COUNT_DTM_START: NORMAL
MDC_IDC_STAT_EPISODE_TYPE: NORMAL

## 2021-08-12 PROCEDURE — G2066 INTER DEVC REMOTE 30D: HCPCS | Performed by: INTERNAL MEDICINE

## 2021-08-12 PROCEDURE — 93298 REM INTERROG DEV EVAL SCRMS: CPT | Mod: 59 | Performed by: INTERNAL MEDICINE

## 2021-08-12 NOTE — TELEPHONE ENCOUNTER
"Patient calling this morning to report that she was supposed to have a \"breathing test\" completed earlier in July.  Patient stating this was discussed in the past, but was delayed due to excessive heat outside and her heart surgery that was done at [place] in May.  She would like to get this breathing test scheduled, but is unsure what type of breathing test it's supposed to be.  Writer sees nothing in chart for orders, nor any notes in recent visits with Diana to provide any clarity on which test is to be performed.  Please look into this and call patient with the information so she can schedule and move forward with this testing.    Any questions, or when orders have been placed please call patient at [phone].  does not work on this line.  "

## 2021-08-12 NOTE — TELEPHONE ENCOUNTER
"Per office visit note on 7/9/2021 with Diana Coello CNP, mention of patient to come back for a \" 6 minute walk to determine need for oxygen\"     Would this be the test patient is referring to as the \"breathing test\" needed? Does this need to be a visit with PCP or a nurse only visit?   "

## 2021-08-17 ENCOUNTER — TRANSFERRED RECORDS (OUTPATIENT)
Dept: HEALTH INFORMATION MANAGEMENT | Facility: CLINIC | Age: 71
End: 2021-08-17

## 2021-08-25 ENCOUNTER — VIRTUAL VISIT (OUTPATIENT)
Dept: FAMILY MEDICINE | Facility: CLINIC | Age: 71
End: 2021-08-25
Payer: MEDICARE

## 2021-08-25 DIAGNOSIS — Z20.822 COVID-19 RULED OUT: Primary | ICD-10-CM

## 2021-08-25 PROCEDURE — 99441 PR PHYSICIAN TELEPHONE EVALUATION 5-10 MIN: CPT | Mod: 95 | Performed by: FAMILY MEDICINE

## 2021-08-25 NOTE — PROGRESS NOTES
"Romy is a 71 year old who is being evaluated via a billable telephone visit.      What phone number would you like to be contacted at? [phone]  How would you like to obtain your AVS? Mail a copy    Assessment & Plan     COVID-19 ruled out  Patient potentially exposed to Covid currently asymptomatic we will order a Covid test.  - Asymptomatic COVID-19 Virus (Coronavirus) by PCR; Future         BMI:   Estimated body mass index is 31.89 kg/m  as calculated from the following:    Height as of 7/26/21: 1.6 m (5' 3\").    Weight as of 7/26/21: 81.6 kg (180 lb).          No follow-ups on file.    Yan Wilson MD  [facility]    Subjective   Romy is a 71 year old who presents for the following health issues    HPI patient calls in today for a telephone visit.  She has been fully vaccinated against Covid, however 4 days ago she was outside there was someone who may have had Covid that she was in the vicinity with, of note she has no symptoms whatsoever she is not feeling sick no cough congestion fever no shortness of breath, but just out of safekeeping she would like to be tested for Covid.    She otherwise is feeling well this been no other change in her health status, and reports in general she is very careful about exposure.     Review of Systems   Constitutional, HEENT, cardiovascular, pulmonary, gi and gu systems are negative, except as otherwise noted.      Objective           Vitals:  No vitals were obtained today due to virtual visit.    Physical Exam   healthy, alert and no distress  PSYCH: Alert and oriented times 3; coherent speech, normal   rate and volume, able to articulate logical thoughts, able   to abstract reason, no tangential thoughts, no hallucinations   or delusions  Her affect is normal  RESP: No cough, no audible wheezing, able to talk in full sentences  Remainder of exam unable to be completed due to telephone visits                 Phone call " duration: 9 minutes

## 2021-09-05 DIAGNOSIS — I48.0 PAROXYSMAL ATRIAL FIBRILLATION (H): Primary | ICD-10-CM

## 2021-09-05 NOTE — TELEPHONE ENCOUNTER
Disp Refills Start End HENRY    apixaban ANTICOAGULANT (ELIQUIS) 5 mg Tab tablet 180 tablet 0 6/10/2021  No   Sig - Route: Take 1 tablet (5 mg total) by mouth 2 (two) times a day. - Oral   Sent to pharmacy as: apixaban 5 mg tablet (ELIQUIS)   E-Prescribing Status: Receipt confirmed by pharmacy (6/10/2021  3:13 PM CDT)   apixaban ANTICOAGULANT (ELIQUIS) 5 mg Tab tablet [[id number]]    Electronically signed by: Ely Torres FNP on 06/10/21 1512 Status: Active   Ordering user: Ely Torres FNP 06/10/21 1512 Authorized by: Ely Torres FNP   Frequency: BID 06/10/21 - Until Discontinued Released by: Keshia Manning CMA 06/10/21 1512   Diagnoses  PAF (paroxysmal atrial fibrillation) (H) [I48.0]       Routing refill request to provider for review/approval because:  Anticoag protocol  - route to provider    Last Written Prescription Date:  6/10/21  Last Fill Quantity: 180,  # refills: 0   Last office visit provider:  8/25/21     Requested Prescriptions   Pending Prescriptions Disp Refills     ELIQUIS ANTICOAGULANT 5 MG tablet [Pharmacy Med Name: ELIQUIS 5MG TABLETS] 180 tablet      Sig: TAKE 1 TABLET(5 MG) BY MOUTH TWICE DAILY       Direct Oral Anticoagulant Agents Passed - 9/5/2021  3:45 AM        Passed - Normal Platelets on file in past 12 months     Recent Labs   Lab Test 07/23/21  1009                  Passed - Medication is active on med list        Passed - Patient is 18-79 years of age        Passed - Serum creatinine less than or equal to 1.4 on file in past 12 mos     Recent Labs   Lab Test 07/23/21  1009   CR 0.87       Ok to refill medication if creatinine is low          Passed - Weight is greater than 60 kg for the past year     Wt Readings from Last 3 Encounters:   07/26/21 81.6 kg (180 lb)   07/23/21 81.2 kg (179 lb)   07/09/21 84.2 kg (185 lb 9.6 oz)             Passed - No active pregnancy on record        Passed - No positive pregnancy test within past 12 months         Passed - Recent (6 mo) or future (30 days) visit within the authorizing provider's specialty             Douglas Rosado RN 09/05/21 1:30 PM

## 2021-09-08 RX ORDER — APIXABAN 5 MG/1
TABLET, FILM COATED ORAL
Qty: 180 TABLET | Refills: 0 | Status: SHIPPED | OUTPATIENT
Start: 2021-09-08 | End: 2021-12-14

## 2021-09-14 ENCOUNTER — TRANSFERRED RECORDS (OUTPATIENT)
Dept: HEALTH INFORMATION MANAGEMENT | Facility: CLINIC | Age: 71
End: 2021-09-14

## 2021-09-27 ENCOUNTER — TELEPHONE (OUTPATIENT)
Dept: CARDIOLOGY | Facility: CLINIC | Age: 71
End: 2021-09-27

## 2021-09-27 NOTE — TELEPHONE ENCOUNTER
Incoming call from patient stating that she is losing her hair after her heart surgery. Is this expected? Is there anything that she can do about this hair loss? Supplements to take? MELINA,RN

## 2021-09-28 NOTE — TELEPHONE ENCOUNTER
Verbal discussion with provider whom states that there isn't any indication from the surgery aspect for hair loss. Recommendation to see PCP for any medications or any thing to check as a cause for the hair loss. MELINA,Rn

## 2021-09-28 NOTE — TELEPHONE ENCOUNTER
PC and discussion with patient. Informed of provider response. Discussion with patient to see PCP and make sure no vitamin deficiencies. Pt has spoken with a nutritionist whom stated to make sure she is getting enough protein in her diet. 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 Encouraged patient to just discuss with [facility] surgical team, and pose question if this has been documented or experienced by other patients. Pt verbalized understanding. Appreciative of return call. Pt will follow up with PCP, and keep asking questions. Reports no medication changes. She has started drinking Lisa tea as she was doing research she found that it can help with hair loss. No further questions at this time. Josy,Rn

## 2021-10-29 ENCOUNTER — TRANSFERRED RECORDS (OUTPATIENT)
Dept: HEALTH INFORMATION MANAGEMENT | Facility: CLINIC | Age: 71
End: 2021-10-29
Payer: MEDICARE

## 2021-11-04 ENCOUNTER — ANCILLARY PROCEDURE (OUTPATIENT)
Dept: CARDIOLOGY | Facility: CLINIC | Age: 71
End: 2021-11-04
Attending: INTERNAL MEDICINE
Payer: MEDICARE

## 2021-11-04 DIAGNOSIS — I42.9 CARDIOMYOPATHY (H): ICD-10-CM

## 2021-11-04 PROCEDURE — 93298 REM INTERROG DEV EVAL SCRMS: CPT | Performed by: INTERNAL MEDICINE

## 2021-11-04 PROCEDURE — G2066 INTER DEVC REMOTE 30D: HCPCS | Performed by: INTERNAL MEDICINE

## 2021-11-07 LAB
MDC_IDC_EPISODE_DTM: NORMAL
MDC_IDC_EPISODE_DURATION: 4.2 S
MDC_IDC_EPISODE_DURATION: 4.56 S
MDC_IDC_EPISODE_DURATION: 4.62 S
MDC_IDC_EPISODE_DURATION: 4.89 S
MDC_IDC_EPISODE_DURATION: 5.59 S
MDC_IDC_EPISODE_DURATION: 5.61 S
MDC_IDC_EPISODE_DURATION: 8.9 S
MDC_IDC_EPISODE_ID: 51
MDC_IDC_EPISODE_ID: 52
MDC_IDC_EPISODE_ID: 53
MDC_IDC_EPISODE_ID: 54
MDC_IDC_EPISODE_ID: 55
MDC_IDC_EPISODE_ID: 56
MDC_IDC_EPISODE_ID: 57
MDC_IDC_EPISODE_TYPE: NORMAL
MDC_IDC_MSMT_BATTERY_STATUS: NORMAL
MDC_IDC_PG_IMPLANT_DTM: NORMAL
MDC_IDC_PG_MFG: NORMAL
MDC_IDC_PG_MODEL: NORMAL
MDC_IDC_PG_SERIAL: NORMAL
MDC_IDC_PG_TYPE: NORMAL
MDC_IDC_SESS_CLINIC_NAME: NORMAL
MDC_IDC_SESS_DTM: NORMAL
MDC_IDC_SESS_TYPE: NORMAL
MDC_IDC_SET_ZONE_DETECTION_INTERVAL: 2000 MS
MDC_IDC_SET_ZONE_DETECTION_INTERVAL: 3000 MS
MDC_IDC_SET_ZONE_DETECTION_INTERVAL: 370 MS
MDC_IDC_SET_ZONE_TYPE: NORMAL
MDC_IDC_STAT_AT_BURDEN_PERCENT: 0 %
MDC_IDC_STAT_AT_DTM_END: NORMAL
MDC_IDC_STAT_AT_DTM_START: NORMAL
MDC_IDC_STAT_EPISODE_RECENT_COUNT: 0
MDC_IDC_STAT_EPISODE_RECENT_COUNT: 7
MDC_IDC_STAT_EPISODE_RECENT_COUNT_DTM_END: NORMAL
MDC_IDC_STAT_EPISODE_RECENT_COUNT_DTM_START: NORMAL
MDC_IDC_STAT_EPISODE_TOTAL_COUNT: 0
MDC_IDC_STAT_EPISODE_TOTAL_COUNT: 1
MDC_IDC_STAT_EPISODE_TOTAL_COUNT: 23
MDC_IDC_STAT_EPISODE_TOTAL_COUNT: 33
MDC_IDC_STAT_EPISODE_TOTAL_COUNT_DTM_END: NORMAL
MDC_IDC_STAT_EPISODE_TOTAL_COUNT_DTM_START: NORMAL
MDC_IDC_STAT_EPISODE_TYPE: NORMAL

## 2021-11-10 ENCOUNTER — TELEPHONE (OUTPATIENT)
Dept: CARDIOLOGY | Facility: CLINIC | Age: 71
End: 2021-11-10
Payer: MEDICARE

## 2021-11-10 NOTE — TELEPHONE ENCOUNTER
Incoming form from [employer] for participation in [employer]. Form to be filled out/signed by JACQUIE. Form completed and return faxed. Sent to [facility] to have scanned into EHR. CMM,RN

## 2021-12-10 ENCOUNTER — TELEPHONE (OUTPATIENT)
Dept: CARDIOLOGY | Facility: CLINIC | Age: 71
End: 2021-12-10
Payer: MEDICARE

## 2021-12-10 NOTE — TELEPHONE ENCOUNTER
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       The lab will notify us if this variant is reclassified and I will pass that on to her.    Tawana Guerrero MS, [facility]  Licensed, Certified Genetic Counselor  [facility]  [facility]

## 2021-12-12 DIAGNOSIS — I48.0 PAROXYSMAL ATRIAL FIBRILLATION (H): ICD-10-CM

## 2021-12-14 RX ORDER — APIXABAN 5 MG/1
TABLET, FILM COATED ORAL
Qty: 180 TABLET | Refills: 0 | Status: SHIPPED | OUTPATIENT
Start: 2021-12-14 | End: 2022-03-28

## 2021-12-14 NOTE — TELEPHONE ENCOUNTER
Routing refill request to provider for review/approval because:  Anticoagulation protocol - route to provider.    Last Written Prescription Date:  9/8/21  Last Fill Quantity: 180,  # refills: 0   Last office visit provider:  7/9/21     Requested Prescriptions   Pending Prescriptions Disp Refills     ELIQUIS ANTICOAGULANT 5 MG tablet [Pharmacy Med Name: ELIQUIS 5MG TABLETS] 180 tablet 0     Sig: TAKE 1 TABLET(5 MG) BY MOUTH TWICE DAILY       Direct Oral Anticoagulant Agents Passed - 12/12/2021  3:45 AM        Passed - Normal Platelets on file in past 12 months     Recent Labs   Lab Test 07/23/21  1009                  Passed - Medication is active on med list        Passed - Patient is 18-79 years of age        Passed - Serum creatinine less than or equal to 1.4 on file in past 12 mos     Recent Labs   Lab Test 07/23/21  1009   CR 0.87       Ok to refill medication if creatinine is low          Passed - Weight is greater than 60 kg for the past year     Wt Readings from Last 3 Encounters:   07/26/21 81.6 kg (180 lb)   07/23/21 81.2 kg (179 lb)   07/09/21 84.2 kg (185 lb 9.6 oz)             Passed - No active pregnancy on record        Passed - No positive pregnancy test within past 12 months        Passed - Recent (6 mo) or future (30 days) visit within the authorizing provider's specialty             Douglas Rosado RN 12/14/21 7:49 AM

## 2022-01-21 ENCOUNTER — TELEPHONE (OUTPATIENT)
Facility: CLINIC | Age: 72
End: 2022-01-21
Payer: COMMERCIAL

## 2022-01-21 NOTE — TELEPHONE ENCOUNTER
Dr Arnold - pt s/p MYECTOMY, SEPTAL., ISOLATION PULMONARY VEIN., LIGATION LEFT ATRIAL APPENDAGE 5/26/21 at [place].    Does she need antibiotics prior to dental visits?  I don't see that she had valve replaced.  What is your recommendation?  -mana

## 2022-01-21 NOTE — TELEPHONE ENCOUNTER
[facility] Call Center    Phone Message    May a detailed message be left on voicemail: yes     Reason for Call: Other: Pt would like a call back asap as she has a dentist appt on monday and knows she is suppose to stop medication sheis just not sure for how long and what mnedication     Action Taken: Message routed to:  [facility] Cardio    Travel Screening: Not Applicable

## 2022-01-21 NOTE — TELEPHONE ENCOUNTER
She shouldn't need antibiotic prophylaxis prior to dental work.  No implanted material within her heart (ie prosthetic heart valves).  JACQUIE

## 2022-01-28 ENCOUNTER — ANCILLARY PROCEDURE (OUTPATIENT)
Dept: CARDIOLOGY | Facility: CLINIC | Age: 72
End: 2022-01-28
Attending: INTERNAL MEDICINE
Payer: COMMERCIAL

## 2022-01-28 DIAGNOSIS — I42.9 CARDIOMYOPATHY (H): ICD-10-CM

## 2022-01-28 DIAGNOSIS — Z45.09 ENCOUNTER FOR LOOP RECORDER CHECK: ICD-10-CM

## 2022-01-28 PROCEDURE — G2066 INTER DEVC REMOTE 30D: HCPCS | Performed by: INTERNAL MEDICINE

## 2022-01-28 PROCEDURE — 93298 REM INTERROG DEV EVAL SCRMS: CPT | Performed by: INTERNAL MEDICINE

## 2022-01-31 ENCOUNTER — OFFICE VISIT (OUTPATIENT)
Dept: CARDIOLOGY | Facility: CLINIC | Age: 72
End: 2022-01-31
Payer: COMMERCIAL

## 2022-01-31 VITALS
RESPIRATION RATE: 16 BRPM | WEIGHT: 190 LBS | DIASTOLIC BLOOD PRESSURE: 78 MMHG | SYSTOLIC BLOOD PRESSURE: 110 MMHG | BODY MASS INDEX: 33.66 KG/M2 | HEART RATE: 72 BPM

## 2022-01-31 DIAGNOSIS — I50.32 CHRONIC DIASTOLIC CONGESTIVE HEART FAILURE (H): ICD-10-CM

## 2022-01-31 DIAGNOSIS — I48.0 PAROXYSMAL ATRIAL FIBRILLATION (H): ICD-10-CM

## 2022-01-31 DIAGNOSIS — I10 ESSENTIAL HYPERTENSION: ICD-10-CM

## 2022-01-31 DIAGNOSIS — I42.2 HYPERTROPHIC CARDIOMYOPATHY (H): Primary | ICD-10-CM

## 2022-01-31 LAB
MDC_IDC_EPISODE_DTM: NORMAL
MDC_IDC_EPISODE_DURATION: 10.56 S
MDC_IDC_EPISODE_DURATION: 10.87 S
MDC_IDC_EPISODE_DURATION: 3.02 S
MDC_IDC_EPISODE_DURATION: 3.05 S
MDC_IDC_EPISODE_DURATION: 3.06 S
MDC_IDC_EPISODE_DURATION: 3.19 S
MDC_IDC_EPISODE_DURATION: 3.28 S
MDC_IDC_EPISODE_DURATION: 3.3 S
MDC_IDC_EPISODE_DURATION: 3.59 S
MDC_IDC_EPISODE_DURATION: 3.82 S
MDC_IDC_EPISODE_DURATION: 4.25 S
MDC_IDC_EPISODE_DURATION: 4.5 S
MDC_IDC_EPISODE_DURATION: 4.56 S
MDC_IDC_EPISODE_DURATION: 4.66 S
MDC_IDC_EPISODE_DURATION: 4.81 S
MDC_IDC_EPISODE_DURATION: 4.89 S
MDC_IDC_EPISODE_DURATION: 4.92 S
MDC_IDC_EPISODE_DURATION: 5.22 S
MDC_IDC_EPISODE_DURATION: 5.86 S
MDC_IDC_EPISODE_DURATION: 6.64 S
MDC_IDC_EPISODE_DURATION: 7.06 S
MDC_IDC_EPISODE_DURATION: 7.1 S
MDC_IDC_EPISODE_DURATION: 7.31 S
MDC_IDC_EPISODE_DURATION: 7.54 S
MDC_IDC_EPISODE_DURATION: 7.66 S
MDC_IDC_EPISODE_DURATION: 9.84 S
MDC_IDC_EPISODE_ID: 58
MDC_IDC_EPISODE_ID: 59
MDC_IDC_EPISODE_ID: 60
MDC_IDC_EPISODE_ID: 61
MDC_IDC_EPISODE_ID: 62
MDC_IDC_EPISODE_ID: 63
MDC_IDC_EPISODE_ID: 64
MDC_IDC_EPISODE_ID: 65
MDC_IDC_EPISODE_ID: 66
MDC_IDC_EPISODE_ID: 67
MDC_IDC_EPISODE_ID: 68
MDC_IDC_EPISODE_ID: 69
MDC_IDC_EPISODE_ID: 70
MDC_IDC_EPISODE_ID: 71
MDC_IDC_EPISODE_ID: 72
MDC_IDC_EPISODE_ID: 73
MDC_IDC_EPISODE_ID: 74
MDC_IDC_EPISODE_ID: 75
MDC_IDC_EPISODE_ID: 76
MDC_IDC_EPISODE_ID: 77
MDC_IDC_EPISODE_ID: 78
MDC_IDC_EPISODE_ID: 79
MDC_IDC_EPISODE_ID: 80
MDC_IDC_EPISODE_ID: 81
MDC_IDC_EPISODE_ID: 82
MDC_IDC_EPISODE_ID: 83
MDC_IDC_EPISODE_TYPE: NORMAL
MDC_IDC_MSMT_BATTERY_STATUS: NORMAL
MDC_IDC_PG_IMPLANT_DTM: NORMAL
MDC_IDC_PG_MFG: NORMAL
MDC_IDC_PG_MODEL: NORMAL
MDC_IDC_PG_SERIAL: NORMAL
MDC_IDC_PG_TYPE: NORMAL
MDC_IDC_SESS_CLINIC_NAME: NORMAL
MDC_IDC_SESS_DTM: NORMAL
MDC_IDC_SESS_TYPE: NORMAL
MDC_IDC_SET_ZONE_DETECTION_INTERVAL: 2000 MS
MDC_IDC_SET_ZONE_DETECTION_INTERVAL: 3000 MS
MDC_IDC_SET_ZONE_DETECTION_INTERVAL: 370 MS
MDC_IDC_SET_ZONE_TYPE: NORMAL
MDC_IDC_STAT_AT_BURDEN_PERCENT: 0 %
MDC_IDC_STAT_AT_DTM_END: NORMAL
MDC_IDC_STAT_AT_DTM_START: NORMAL
MDC_IDC_STAT_EPISODE_RECENT_COUNT: 0
MDC_IDC_STAT_EPISODE_RECENT_COUNT: 26
MDC_IDC_STAT_EPISODE_RECENT_COUNT_DTM_END: NORMAL
MDC_IDC_STAT_EPISODE_RECENT_COUNT_DTM_START: NORMAL
MDC_IDC_STAT_EPISODE_TOTAL_COUNT: 0
MDC_IDC_STAT_EPISODE_TOTAL_COUNT: 1
MDC_IDC_STAT_EPISODE_TOTAL_COUNT: 33
MDC_IDC_STAT_EPISODE_TOTAL_COUNT: 49
MDC_IDC_STAT_EPISODE_TOTAL_COUNT_DTM_END: NORMAL
MDC_IDC_STAT_EPISODE_TOTAL_COUNT_DTM_START: NORMAL
MDC_IDC_STAT_EPISODE_TYPE: NORMAL

## 2022-01-31 PROCEDURE — 99214 OFFICE O/P EST MOD 30 MIN: CPT | Performed by: INTERNAL MEDICINE

## 2022-01-31 RX ORDER — MULTIVIT WITH MINERALS/LUTEIN
2 TABLET ORAL DAILY
COMMUNITY

## 2022-01-31 RX ORDER — BIOTIN 10000 MCG
1 CAPSULE ORAL DAILY
COMMUNITY

## 2022-01-31 NOTE — PATIENT INSTRUCTIONS
It was a pleasure to meet with you today.      Below is a summary of your visit.   1. Continue your medications without changes.  2. Continue your regular exercise  3. Follow up with me in about 6 months or sooner if needed.     Please do not hesitate to call the [facility] clinic with any questions or concerns at [phone]. You can also reach my nurse, Jeana, during normal business hours at [phone].    Sincerely,

## 2022-01-31 NOTE — PROGRESS NOTES
Thank you, Diana Mahoney, for asking the [facility] team to see Ms. Romy Izquierdo to evaluate Follow Up (heart failure, HCM)        Assessment/Recommendations   Assessment:    Hypertrophic cardiomyopathy with dynamic LVOT gradient -  Now s/p septal myectomy at [facility] on 5/26/21. Stable.  Hypertension - well-controlled.  Paroxysmal atrial fibrillation - with RVR - s/p surgical pulmonary vein isolation. No recent afib on ILR interrogation.  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 Has not been confirmed by [facility]. Remains on Eliquis.  Mild coronary artery disease with stenosis of 20-30% in all major coronary arteries - stable without angina.  Parsonage-monet syndrome with paralyzed right hemidiaphragm  chronic kidney disease stage II - stable.  chronic congestive heart failure with preserved LVEF - currently compensated    Plan:  Continue medications without changes.  Encouraged regular exercise  Follow up in 6 months or sooner if needed.    A total of 31 minutes were spent on today's encounter.         History of Present Illness   Ms. Romy Izquierdo is a 71 year old female with a significant past history of hypertension who presents for follow-up of HOCM and atrial fibrillation.      has hypertrophic obstructive cardiomyopathy with a prior echo performed on 8/20/2020 that demonstrated progression of the LVOT gradient, peaking at 100 mmHg, which is increased from 40 mmHg a year ago. This does not result in significant mitral valve regurgitation but asymmetric septal hypertrophy was noted. She has had one episode of syncope in her lifetime that was attributed to Parsonage-Monet syndrome. A cardiac MRI confirmed the diagnosis of HOCM. With an LVOT peak gradient of 80 mmHg and paroxysmal atrial fibrillation she was referred for septal myectomy. Her surgery occurred on 5/26/21 and consisted of a septal myectomy with pulmonary vein isolation and left  atrial appendage excision. Her post-operative course was notable for paroxysmal atrial fibrillation treated with amiodarone for several weeks. She has not had recurrent afib and is monitored with an implantable loop recorder.    Today, Romy is feeling well. She is exercising nearly daily (2 days per week at [employer], the rest at home). She has no exertional symptoms or limitations. She does note some difficulties breathing when bending over at the waist.    Loop recorder interrogation from 1/28/21 revealed no significant arrhythmias or true pauses.    Other than noted above, Ms. Izquierdo denies any chest pain/pressure/tightness, shortness of breath at rest or with exertion, light headedness/dizziness, pre-syncope, syncope, lower extremity swelling, palpitations, paroxysmal nocturnal dyspnea (PND), or orthopnea.     Cardiac Problems and Cardiac Diagnostics     Most Recent Cardiac testing:  ECG dated 7/23/21 (personaly reviewed and interpreted): sinus rhythm with first degree AV block, left axis deviation and LBBB    Cardiac rhythm monitor 11/16/2020  CONCLUSION:  Symptoms of heart racing on 1 occasion associated with atrial flutter.   Symptoms of shortness of breath on 9 occasions, associated with sinus rhythm and  first-degree AV block. Paroxysmal atrial fibrillation was present on 2 days with a  total of 5 hours and 6 hours, respectively with average ventricular response of  approximately 85 beats per minute and peak ventricular response of approximately 110  beats per minute.  A single short run of accelerated idioventricular rhythm was  noted on auto transmissions.  Rather marked nocturnal sinus bradycardia with heart  rates in the 30s were noted on multiple days.     ECHO (report reviewed):   TTE 7/1/21    Normal left ventricular size with severe hypertrophy.    Left ventricle ejection fraction is normal. The calculated left ventricular ejection fraction is 64%.    Normal right ventricular size and  systolic function.    Systolic anterior motion of the anterior leaflet of the mitral valve is noted without significant mitral regurgitation.    The peak gradient across the LVOT and aortic valve is 11 mmHg without evidence of dynamic LVOT obstruction.    A left pleural effusion and trivial pericardial effusion are noted.    When compared to the previous study dated 1/7/2021, a dynamic LVOT obstruction is no longer present. The peak outflow tract gradient has reduced from 81 to 11 mmHg. The pleural and pericardial effusions are new.     Cardiac MRI 9/21/2020  IMPRESSIONS:    1.  Normal left ventricular size, thickened septum with maximal measurement 18-20 mm, inferolateral wall 7-8 mm. The quantified left ventricular ejection fraction is 69%. It appears less deformation of thickened septum per tagging images. There is no rest perfusion defect. It is evident that there is LVOT obstruction by flow turbulence and EMELYN.  The late delayed gadolinium enhancement study demonstrated that there are patchy gadolinium enhancement in thickened basal septal segment and also in basal lateral segment. Put all together, the findings are consistent with hypertrophic cardiomyopathy.   2.  Normal right ventricular size function.    3.  Mildly enlarged both atria.  4.  Moderate mitral valve regurgitation.  5.  Mildly dilated mid ascending aorta 40 mm.     Cardiac cath: from 5/25/21 at [facility] demonstrated   1.  CORONARY ANGIOGRAPHY   FINAL DIAGNOSIS   1.  Mild coronary artery atherosclerosis   PRE-PROCEDURE DIAGNOSIS   1.  Cardiomyopathy Hypertrophic (HCC)   CORONARY DIAGNOSTIC SUMMARY   Coronary artery dominance is right. Normal right coronary.   The left main coronary artery is 30% obstructed by a discrete lesion and 20% obstructed by a discrete lesion.   The proximal left anterior descending artery is 20% obstructed by a discrete lesion.   The middle left anterior descending artery is 20% obstructed by a tubular lesion.   The  distal left anterior descending artery is 20% obstructed by a discrete lesion.   Noted systolic compression of septal  arteries, likely consistent with patient's known hypertrophic cardiomyopathy.          Medications  Allergies   Current Outpatient Medications   Medication Sig Dispense Refill     Biotin 10 MG CAPS Take 1 capsule by mouth daily       calcium carbonate-vitamin D (OSCAL W/D) 500-200 MG-UNIT tablet Take 2 tablets by mouth       Cetaphil Moisturizing (CETAPHIL) external lotion Apply 1 Application topically daily       cycloSPORINE (RESTASIS) 0.05 % ophthalmic emulsion 1 drop       ELIQUIS ANTICOAGULANT 5 MG tablet TAKE 1 TABLET(5 MG) BY MOUTH TWICE DAILY 180 tablet 0     furosemide (LASIX) 40 MG tablet Take 40 mg by mouth daily       metoprolol succinate ER (TOPROL-XL) 50 MG 24 hr tablet TAKE 1 TABLET(50 MG) BY MOUTH TWICE DAILY 180 tablet 0     multivitamin (CENTRUM SILVER) tablet Take 2 tablets by mouth daily       Omega-3 Fatty Acids (RA FISH OIL) 1000 MG CAPS 2 capsules in the morning and 1 capsule at night       potassium chloride ER (KLOR-CON M) 20 MEQ CR tablet Take 20 mEq by mouth daily       sodium chloride (OCEAN) 0.65 % nasal spray 1 spray       SUMAtriptan (IMITREX) 50 MG tablet Take 50 mg by mouth as needed       Turmeric Curcumin 500 MG CAPS Take 1 tablet by mouth daily        Allergies   Allergen Reactions     Shellfish Allergy Shortness Of Breath     Midazolam Hives     Welts     Acetaminophen      Dust Mites Other (See Comments)     Stuffy nose     Adhesive Tape Rash        Physical Examination Review of Systems   Vitals: /78 (BP Location: Left arm, Patient Position: Sitting, Cuff Size: Adult Regular)   Pulse 72   Resp 16   Wt 86.2 kg (190 lb)   BMI 33.66 kg/m    BMI= Body mass index is 33.66 kg/m .  Wt Readings from Last 3 Encounters:   01/31/22 86.2 kg (190 lb)   07/26/21 81.6 kg (180 lb)   07/23/21 81.2 kg (179 lb)       General Appearance:   Pleasant female,  appears stated age. no acute distress, overweight body habitus   ENT/Mouth: membranes moist, no apparent gingival bleeding.      EYES:  no scleral icterus, normal conjunctivae   Neck: no carotid bruits. supple   Respiratory:   lungs are clear to auscultation, no rales or wheezing, equal chest wall expansion    Cardiovascular:   Regular rhythm, normal rate. Normal first and second heart sounds with 1/6 systolic murmur at upper sternal border. no rubs or gallops; Jugular venous pressure normal, no edema bilaterally    Abdomen/GI:  Soft, non-tender   Extremities: no cyanosis or clubbing   Skin: no xanthelasma, warm.    Heme/lymph/ Immunology No apparent bleeding noted.   Neurologic: Alert and oriented. normal gait, no tremors   Psychiatric: Pleasant, calm, appropriate affect.         Please refer above for cardiac ROS details.       Past History   Past Medical History:   Past Medical History:   Diagnosis Date     Hypertrophic cardiomyopathy (H) 1/15/2021    Severe outflow tract obstruction Preserved LV systolic performance Basal septum: 20 mm Gadolinium enhancement positive: Basal septum No major positive and no other minor positive risk factors identified.     Paroxysmal atrial fibrillation (H) 1/15/2021    Dx Nov 2020 (NAIF) [id number] score = 3 Rx apixaban     Parsonage-Mcknight syndrome         Past Surgical History:   Past Surgical History:   Procedure Laterality Date     EP LOOP RECORDER IMPLANT N/A 1/29/2021    Procedure: EP Loop Recorder Insertion;  Surgeon: Paul Garza MD;  Location: [facility] Cardiac Cath Lab;  Service: Cardiology     EXCISE BREAST CYST/FIBROADENOMA/TUMOR/DUCT LESION/NIPPLE LESION/AREOLAR LESION      Description: Breast Surgery Lumpectomy;  Recorded: 03/07/2012;  Comments: adenoma      EXCISION NASAL POLYP(S), EXTENSIVE      Description: Extensive Excision Of Nasal Polyps;  Recorded: 03/07/2012;      KNEE SCOPE, DIAGNOSTIC      Description: Arthroscopy Knee Left;  Recorded:  03/07/2012;     [place] EXCIS TENDON SHEATH LESION, HAND/FINGER      Description: Hand Excision Of A Tendon Cyst;  Recorded: 03/07/2012;     US GUIDED NEEDLE PLACEMENT  7/20/2020     ZZC REMOVAL OF OVARY(S)      Description: Oophorectomy;  Recorded: 06/26/2013;        Family History:   Family History   Problem Relation Age of Onset     Pacemaker Father      Coronary Artery Disease Father 65.00     Colon Cancer Father         XRT and chemo     Heart Failure Father 79.00     Cerebrovascular Disease Mother 89.00     Hypertension Mother      Endometrial Cancer Mother      Other - See Comments Mother         Scarlet fever vs rheumatic fever     Other - See Comments Cousin 16.00        Drown, Hypertrophic (apparently via father - not realted)     Cardiomyopathy Cousin         Hypertrophic (apparently via father - not realted)     Cardiomyopathy Child         Hypertrophic (apparently via father - not realted)     Sudden Death No family hx of         Social History:   Social History     Socioeconomic History     Marital status:      Spouse name: Not on file     Number of children: Not on file     Years of education: Not on file     Highest education level: Not on file   Occupational History     Not on file   Tobacco Use     Smoking status: Never Smoker     Smokeless tobacco: Never Used   Substance and Sexual Activity     Alcohol use: Never     Drug use: Never     Sexual activity: Not on file   Other Topics Concern     Not on file   Social History Narrative     Not on file     Social Determinants of Health     Financial Resource Strain: Not on file   Food Insecurity: Not on file   Transportation Needs: Not on file   Physical Activity: Not on file   Stress: Not on file   Social Connections: Not on file   Intimate Partner Violence: Not on file   Housing Stability: Not on file            Lab Results    Chemistry/lipid CBC Cardiac Enzymes/BNP/TSH/INR   Lab Results   Component Value Date    CHOL 225 (H) 07/29/2020    HDL 64  07/29/2020    TRIG 109 07/29/2020    BUN 21 07/23/2021     07/23/2021    CO2 30 07/23/2021    Lab Results   Component Value Date    WBC 8.0 07/23/2021    HGB 12.4 07/23/2021    HCT 40.3 07/23/2021    MCV 88 07/23/2021     07/23/2021    Lab Results   Component Value Date    TROPONINI 0.03 07/23/2021     (H) 07/23/2021    TSH 2.48 05/27/2020

## 2022-01-31 NOTE — LETTER
1/31/2022    Diana Coello, CLIVE  [address] [facility] [address]    RE: Romy Izquierdo       Dear Colleague,     I had the pleasure of seeing Romy Izquierdo in the [facility].      Thank you, Diana Mahoney, for asking the [facility] team to see Ms. Romy Izquierdo to evaluate Follow Up (heart failure, HCM)        Assessment/Recommendations   Assessment:    1. Hypertrophic cardiomyopathy with dynamic LVOT gradient -  Now s/p septal myectomy at [facility] on 5/26/21. Stable.  2. Hypertension - well-controlled.  3. Paroxysmal atrial fibrillation - with RVR - s/p surgical pulmonary vein isolation. No recent afib on ILR interrogation.  4. 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 Has not been confirmed by CHARISMA. Remains on Eliquis.  5. Mild coronary artery disease with stenosis of 20-30% in all major coronary arteries - stable without angina.  6. Parsonage-monet syndrome with paralyzed right hemidiaphragm  7. chronic kidney disease stage II - stable.  8. chronic congestive heart failure with preserved LVEF - currently compensated    Plan:  1. Continue medications without changes.  2. Encouraged regular exercise  3. Follow up in 6 months or sooner if needed.    A total of 31 minutes were spent on today's encounter.         History of Present Illness   Ms. Romy Izquierdo is a 71 year old female with a significant past history of hypertension who presents for follow-up of HOCM and atrial fibrillation.      has hypertrophic obstructive cardiomyopathy with a prior echo performed on 8/20/2020 that demonstrated progression of the LVOT gradient, peaking at 100 mmHg, which is increased from 40 mmHg a year ago. This does not result in significant mitral valve regurgitation but asymmetric septal hypertrophy was noted. She has had one episode of syncope in her lifetime that was attributed to Parsonage-Monet syndrome. A  cardiac MRI confirmed the diagnosis of HOCM. With an LVOT peak gradient of 80 mmHg and paroxysmal atrial fibrillation she was referred for septal myectomy. Her surgery occurred on 5/26/21 and consisted of a septal myectomy with pulmonary vein isolation and left atrial appendage excision. Her post-operative course was notable for paroxysmal atrial fibrillation treated with amiodarone for several weeks. She has not had recurrent afib and is monitored with an implantable loop recorder.    Today, Romy is feeling well. She is exercising nearly daily (2 days per week at [employer], the rest at home). She has no exertional symptoms or limitations. She does note some difficulties breathing when bending over at the waist.    Loop recorder interrogation from 1/28/21 revealed no significant arrhythmias or true pauses.    Other than noted above, Ms. Izquierdo denies any chest pain/pressure/tightness, shortness of breath at rest or with exertion, light headedness/dizziness, pre-syncope, syncope, lower extremity swelling, palpitations, paroxysmal nocturnal dyspnea (PND), or orthopnea.     Cardiac Problems and Cardiac Diagnostics     Most Recent Cardiac testing:  ECG dated 7/23/21 (personaly reviewed and interpreted): sinus rhythm with first degree AV block, left axis deviation and LBBB    Cardiac rhythm monitor 11/16/2020  CONCLUSION:  Symptoms of heart racing on 1 occasion associated with atrial flutter.   Symptoms of shortness of breath on 9 occasions, associated with sinus rhythm and  first-degree AV block. Paroxysmal atrial fibrillation was present on 2 days with a  total of 5 hours and 6 hours, respectively with average ventricular response of  approximately 85 beats per minute and peak ventricular response of approximately 110  beats per minute.  A single short run of accelerated idioventricular rhythm was  noted on auto transmissions.  Rather marked nocturnal sinus bradycardia with heart  rates in the 30s were noted  on multiple days.     ECHO (report reviewed):   TTE 7/1/21    Normal left ventricular size with severe hypertrophy.    Left ventricle ejection fraction is normal. The calculated left ventricular ejection fraction is 64%.    Normal right ventricular size and systolic function.    Systolic anterior motion of the anterior leaflet of the mitral valve is noted without significant mitral regurgitation.    The peak gradient across the LVOT and aortic valve is 11 mmHg without evidence of dynamic LVOT obstruction.    A left pleural effusion and trivial pericardial effusion are noted.    When compared to the previous study dated 1/7/2021, a dynamic LVOT obstruction is no longer present. The peak outflow tract gradient has reduced from 81 to 11 mmHg. The pleural and pericardial effusions are new.     Cardiac MRI 9/21/2020  IMPRESSIONS:    1.  Normal left ventricular size, thickened septum with maximal measurement 18-20 mm, inferolateral wall 7-8 mm. The quantified left ventricular ejection fraction is 69%. It appears less deformation of thickened septum per tagging images. There is no rest perfusion defect. It is evident that there is LVOT obstruction by flow turbulence and EMELYN.  The late delayed gadolinium enhancement study demonstrated that there are patchy gadolinium enhancement in thickened basal septal segment and also in basal lateral segment. Put all together, the findings are consistent with hypertrophic cardiomyopathy.   2.  Normal right ventricular size function.    3.  Mildly enlarged both atria.  4.  Moderate mitral valve regurgitation.  5.  Mildly dilated mid ascending aorta 40 mm.     Cardiac cath: from 5/25/21 at [facility] demonstrated   1.  CORONARY ANGIOGRAPHY   FINAL DIAGNOSIS   1.  Mild coronary artery atherosclerosis   PRE-PROCEDURE DIAGNOSIS   1.  Cardiomyopathy Hypertrophic (HCC)   CORONARY DIAGNOSTIC SUMMARY   Coronary artery dominance is right. Normal right coronary.   The left main coronary artery is  30% obstructed by a discrete lesion and 20% obstructed by a discrete lesion.   The proximal left anterior descending artery is 20% obstructed by a discrete lesion.   The middle left anterior descending artery is 20% obstructed by a tubular lesion.   The distal left anterior descending artery is 20% obstructed by a discrete lesion.   Noted systolic compression of septal  arteries, likely consistent with patient's known hypertrophic cardiomyopathy.          Medications  Allergies   Current Outpatient Medications   Medication Sig Dispense Refill     Biotin 10 MG CAPS Take 1 capsule by mouth daily       calcium carbonate-vitamin D (OSCAL W/D) 500-200 MG-UNIT tablet Take 2 tablets by mouth       Cetaphil Moisturizing (CETAPHIL) external lotion Apply 1 Application topically daily       cycloSPORINE (RESTASIS) 0.05 % ophthalmic emulsion 1 drop       ELIQUIS ANTICOAGULANT 5 MG tablet TAKE 1 TABLET(5 MG) BY MOUTH TWICE DAILY 180 tablet 0     furosemide (LASIX) 40 MG tablet Take 40 mg by mouth daily       metoprolol succinate ER (TOPROL-XL) 50 MG 24 hr tablet TAKE 1 TABLET(50 MG) BY MOUTH TWICE DAILY 180 tablet 0     multivitamin (CENTRUM SILVER) tablet Take 2 tablets by mouth daily       Omega-3 Fatty Acids (RA FISH OIL) 1000 MG CAPS 2 capsules in the morning and 1 capsule at night       potassium chloride ER (KLOR-CON M) 20 MEQ CR tablet Take 20 mEq by mouth daily       sodium chloride (OCEAN) 0.65 % nasal spray 1 spray       SUMAtriptan (IMITREX) 50 MG tablet Take 50 mg by mouth as needed       Turmeric Curcumin 500 MG CAPS Take 1 tablet by mouth daily        Allergies   Allergen Reactions     Shellfish Allergy Shortness Of Breath     Midazolam Hives     Welts     Acetaminophen      Dust Mites Other (See Comments)     Stuffy nose     Adhesive Tape Rash        Physical Examination Review of Systems   Vitals: /78 (BP Location: Left arm, Patient Position: Sitting, Cuff Size: Adult Regular)   Pulse 72   Resp  16   Wt 86.2 kg (190 lb)   BMI 33.66 kg/m    BMI= Body mass index is 33.66 kg/m .  Wt Readings from Last 3 Encounters:   01/31/22 86.2 kg (190 lb)   07/26/21 81.6 kg (180 lb)   07/23/21 81.2 kg (179 lb)       General Appearance:   Pleasant female, appears stated age. no acute distress, overweight body habitus   ENT/Mouth: membranes moist, no apparent gingival bleeding.      EYES:  no scleral icterus, normal conjunctivae   Neck: no carotid bruits. supple   Respiratory:   lungs are clear to auscultation, no rales or wheezing, equal chest wall expansion    Cardiovascular:   Regular rhythm, normal rate. Normal first and second heart sounds with 1/6 systolic murmur at upper sternal border. no rubs or gallops; Jugular venous pressure normal, no edema bilaterally    Abdomen/GI:  Soft, non-tender   Extremities: no cyanosis or clubbing   Skin: no xanthelasma, warm.    Heme/lymph/ Immunology No apparent bleeding noted.   Neurologic: Alert and oriented. normal gait, no tremors   Psychiatric: Pleasant, calm, appropriate affect.         Please refer above for cardiac ROS details.       Past History   Past Medical History:   Past Medical History:   Diagnosis Date     Hypertrophic cardiomyopathy (H) 1/15/2021    Severe outflow tract obstruction Preserved LV systolic performance Basal septum: 20 mm Gadolinium enhancement positive: Basal septum No major positive and no other minor positive risk factors identified.     Paroxysmal atrial fibrillation (H) 1/15/2021    Dx Nov 2020 (NAIF) [id number] score = 3 Rx apixaban     Parsonage-Mcknight syndrome         Past Surgical History:   Past Surgical History:   Procedure Laterality Date     EP LOOP RECORDER IMPLANT N/A 1/29/2021    Procedure: EP Loop Recorder Insertion;  Surgeon: Paul Garza MD;  Location: [facility] Cardiac Cath Lab;  Service: Cardiology     EXCISE BREAST CYST/FIBROADENOMA/TUMOR/DUCT LESION/NIPPLE LESION/AREOLAR LESION      Description: Breast Surgery Lumpectomy;   Recorded: 03/07/2012;  Comments: adenoma     HC EXCISION NASAL POLYP(S), EXTENSIVE      Description: Extensive Excision Of Nasal Polyps;  Recorded: 03/07/2012;     HC KNEE SCOPE, DIAGNOSTIC      Description: Arthroscopy Knee Left;  Recorded: 03/07/2012;     WY EXCIS TENDON SHEATH LESION, HAND/FINGER      Description: Hand Excision Of A Tendon Cyst;  Recorded: 03/07/2012;     US GUIDED NEEDLE PLACEMENT  7/20/2020     ZZC REMOVAL OF OVARY(S)      Description: Oophorectomy;  Recorded: 06/26/2013;        Family History:   Family History   Problem Relation Age of Onset     Pacemaker Father      Coronary Artery Disease Father 65.00     Colon Cancer Father         XRT and chemo     Heart Failure Father 79.00     Cerebrovascular Disease Mother 89.00     Hypertension Mother      Endometrial Cancer Mother      Other - See Comments Mother         Scarlet fever vs rheumatic fever     Other - See Comments Cousin 16.00        Drown, Hypertrophic (apparently via father - not realted)     Cardiomyopathy Cousin         Hypertrophic (apparently via father - not realted)     Cardiomyopathy Child         Hypertrophic (apparently via father - not realted)     Sudden Death No family hx of         Social History:   Social History     Socioeconomic History     Marital status:      Spouse name: Not on file     Number of children: Not on file     Years of education: Not on file     Highest education level: Not on file   Occupational History     Not on file   Tobacco Use     Smoking status: Never Smoker     Smokeless tobacco: Never Used   Substance and Sexual Activity     Alcohol use: Never     Drug use: Never     Sexual activity: Not on file   Other Topics Concern     Not on file   Social History Narrative     Not on file     Social Determinants of Health     Financial Resource Strain: Not on file   Food Insecurity: Not on file   Transportation Needs: Not on file   Physical Activity: Not on file   Stress: Not on file   Social  Connections: Not on file   Intimate Partner Violence: Not on file   Housing Stability: Not on file            Lab Results    Chemistry/lipid CBC Cardiac Enzymes/BNP/TSH/INR   Lab Results   Component Value Date    CHOL 225 (H) 07/29/2020    HDL 64 07/29/2020    TRIG 109 07/29/2020    BUN 21 07/23/2021     07/23/2021    CO2 30 07/23/2021    Lab Results   Component Value Date    WBC 8.0 07/23/2021    HGB 12.4 07/23/2021    HCT 40.3 07/23/2021    MCV 88 07/23/2021     07/23/2021    Lab Results   Component Value Date    TROPONINI 0.03 07/23/2021     (H) 07/23/2021    TSH 2.48 05/27/2020                Thank you for allowing me to participate in the care of your patient.      Sincerely,     Hossein Arnold MD     [facility]  cc:   No referring provider defined for this encounter.

## 2022-03-10 ENCOUNTER — TRANSFERRED RECORDS (OUTPATIENT)
Dept: HEALTH INFORMATION MANAGEMENT | Facility: CLINIC | Age: 72
End: 2022-03-10
Payer: COMMERCIAL

## 2022-03-28 DIAGNOSIS — I48.0 PAROXYSMAL ATRIAL FIBRILLATION (H): ICD-10-CM

## 2022-05-06 ENCOUNTER — TRANSFERRED RECORDS (OUTPATIENT)
Dept: HEALTH INFORMATION MANAGEMENT | Facility: CLINIC | Age: 72
End: 2022-05-06
Payer: COMMERCIAL

## 2022-05-09 ENCOUNTER — ANCILLARY PROCEDURE (OUTPATIENT)
Dept: CARDIOLOGY | Facility: CLINIC | Age: 72
End: 2022-05-09
Attending: INTERNAL MEDICINE
Payer: COMMERCIAL

## 2022-05-09 DIAGNOSIS — I42.2 HYPERTROPHIC CARDIOMYOPATHY (H): ICD-10-CM

## 2022-05-09 DIAGNOSIS — Z45.09 ENCOUNTER FOR LOOP RECORDER CHECK: ICD-10-CM

## 2022-05-09 DIAGNOSIS — I48.0 PAF (PAROXYSMAL ATRIAL FIBRILLATION) (H): ICD-10-CM

## 2022-05-09 LAB
MDC_IDC_EPISODE_DTM: NORMAL
MDC_IDC_EPISODE_DURATION: 19.4 S
MDC_IDC_EPISODE_DURATION: 3.34 S
MDC_IDC_EPISODE_DURATION: 3.46 S
MDC_IDC_EPISODE_DURATION: 3.48 S
MDC_IDC_EPISODE_DURATION: 3.53 S
MDC_IDC_EPISODE_DURATION: 3.54 S
MDC_IDC_EPISODE_DURATION: 3.63 S
MDC_IDC_EPISODE_DURATION: 3.64 S
MDC_IDC_EPISODE_DURATION: 3.64 S
MDC_IDC_EPISODE_DURATION: 3.68 S
MDC_IDC_EPISODE_DURATION: 3.69 S
MDC_IDC_EPISODE_DURATION: 3.71 S
MDC_IDC_EPISODE_DURATION: 3.75 S
MDC_IDC_EPISODE_DURATION: 3.8 S
MDC_IDC_EPISODE_DURATION: 3.81 S
MDC_IDC_EPISODE_DURATION: 3.84 S
MDC_IDC_EPISODE_DURATION: 3.91 S
MDC_IDC_EPISODE_DURATION: 4.1 S
MDC_IDC_EPISODE_DURATION: 4.36 S
MDC_IDC_EPISODE_DURATION: 4.37 S
MDC_IDC_EPISODE_DURATION: 4.54 S
MDC_IDC_EPISODE_DURATION: 4.71 S
MDC_IDC_EPISODE_DURATION: 4.89 S
MDC_IDC_EPISODE_DURATION: 5.12 S
MDC_IDC_EPISODE_DURATION: 5.38 S
MDC_IDC_EPISODE_DURATION: 5.54 S
MDC_IDC_EPISODE_DURATION: 5.68 S
MDC_IDC_EPISODE_DURATION: 6.21 S
MDC_IDC_EPISODE_DURATION: 80.12 S
MDC_IDC_EPISODE_DURATION: 9.02 S
MDC_IDC_EPISODE_ID: 141
MDC_IDC_EPISODE_ID: 142
MDC_IDC_EPISODE_ID: 143
MDC_IDC_EPISODE_ID: 144
MDC_IDC_EPISODE_ID: 145
MDC_IDC_EPISODE_ID: 146
MDC_IDC_EPISODE_ID: 147
MDC_IDC_EPISODE_ID: 148
MDC_IDC_EPISODE_ID: 149
MDC_IDC_EPISODE_ID: 150
MDC_IDC_EPISODE_ID: 151
MDC_IDC_EPISODE_ID: 152
MDC_IDC_EPISODE_ID: 153
MDC_IDC_EPISODE_ID: 154
MDC_IDC_EPISODE_ID: 155
MDC_IDC_EPISODE_ID: 156
MDC_IDC_EPISODE_ID: 157
MDC_IDC_EPISODE_ID: 158
MDC_IDC_EPISODE_ID: 159
MDC_IDC_EPISODE_ID: 160
MDC_IDC_EPISODE_ID: 161
MDC_IDC_EPISODE_ID: 162
MDC_IDC_EPISODE_ID: 163
MDC_IDC_EPISODE_ID: 164
MDC_IDC_EPISODE_ID: 165
MDC_IDC_EPISODE_ID: 166
MDC_IDC_EPISODE_ID: 167
MDC_IDC_EPISODE_ID: 168
MDC_IDC_EPISODE_ID: 169
MDC_IDC_EPISODE_ID: 170
MDC_IDC_EPISODE_TYPE: NORMAL
MDC_IDC_MSMT_BATTERY_STATUS: NORMAL
MDC_IDC_PG_IMPLANT_DTM: NORMAL
MDC_IDC_PG_MFG: NORMAL
MDC_IDC_PG_MODEL: NORMAL
MDC_IDC_PG_SERIAL: NORMAL
MDC_IDC_PG_TYPE: NORMAL
MDC_IDC_SESS_CLINIC_NAME: NORMAL
MDC_IDC_SESS_DTM: NORMAL
MDC_IDC_SESS_TYPE: NORMAL
MDC_IDC_SET_ZONE_DETECTION_INTERVAL: 2000 MS
MDC_IDC_SET_ZONE_DETECTION_INTERVAL: 3000 MS
MDC_IDC_SET_ZONE_DETECTION_INTERVAL: 370 MS
MDC_IDC_SET_ZONE_TYPE: NORMAL
MDC_IDC_STAT_AT_BURDEN_PERCENT: 0 %
MDC_IDC_STAT_AT_DTM_END: NORMAL
MDC_IDC_STAT_AT_DTM_START: NORMAL
MDC_IDC_STAT_EPISODE_RECENT_COUNT: 0
MDC_IDC_STAT_EPISODE_RECENT_COUNT: 87
MDC_IDC_STAT_EPISODE_RECENT_COUNT_DTM_END: NORMAL
MDC_IDC_STAT_EPISODE_RECENT_COUNT_DTM_START: NORMAL
MDC_IDC_STAT_EPISODE_TOTAL_COUNT: 0
MDC_IDC_STAT_EPISODE_TOTAL_COUNT: 1
MDC_IDC_STAT_EPISODE_TOTAL_COUNT: 136
MDC_IDC_STAT_EPISODE_TOTAL_COUNT: 33
MDC_IDC_STAT_EPISODE_TOTAL_COUNT_DTM_END: NORMAL
MDC_IDC_STAT_EPISODE_TOTAL_COUNT_DTM_START: NORMAL
MDC_IDC_STAT_EPISODE_TYPE: NORMAL

## 2022-05-09 PROCEDURE — G2066 INTER DEVC REMOTE 30D: HCPCS | Performed by: INTERNAL MEDICINE

## 2022-05-09 PROCEDURE — 93297 REM INTERROG DEV EVAL ICPMS: CPT | Performed by: INTERNAL MEDICINE

## 2022-05-12 NOTE — PROGRESS NOTES
Cely White would like to request a referral.   Reason: Dermatologist   Requested provider: [facility]   [facility]:   [facility]   for my yearly skin check    Thank you   Cely        Progress Notes by Hossein Arnold MD at 9/11/2020  1:50 PM     Author: Hossein Arnold MD Service: -- Author Type: Physician    Filed: 9/11/2020  2:43 PM Encounter Date: 9/11/2020 Status: Signed    : Hossein Arnold MD (Physician)           Thank you, Diana Mahoney, [facility], for asking the [facility] team to see Ms. Romy Perez to evaluate Cardiomyopathy.      Assessment/Recommendations   Assessment:    1. Asymmetric septal hypertrophy with a dynamic LVOT gradient (peak 100 mmHg) - suspicious for hypertrophic cardiomyopathy - some dyspnea with moderate exertion.  2. Hypertension - BP generally controlled but heart rate is faster than I would like to see it with her LVOT gradient.    Plan:  1. Cardiac MRI to look for hypertrophic cardiomyopathy  2. Increase metoprolol to 50 mg daily  3. Stop amlodipine  4. Follow up in 6 weeks         History of Present Illness   Ms. Romy Perez is a 70 y.o. female with a significant past history of hypertension who presents for evaluation of an abnormal echocardiogram.     recently had an echocardiogram performed to re-evaluate asymmetric septal hypertrophy with a dynamic LVOT gradient. The recent echo, performed on 8/20/2020 demonstrated progression of the LVOT gradient, peaking at 100 mmHg, which is increased from 40 mmHg a year ago. This does not result in significant mitral valve regurgitation but asymmetric septal hypertrophy was noted. She does report some mild dyspnea with walking up hills and with moderate or higher levels of exertion. She does exercise regularly at a low-moderate intensity and tolerates this without difficulties. She had one episode of syncope in her lifetime that was attributed to Parsonage-Mcknight syndrome. She does have a cousin that recently had surgery at [facility] for thickening of the heart muscle (HOCM?). Her father had an episode of sudden LOC or syncope  while sitting at the dinner table that was treated with ah pacemaker in 1977.      Other than noted above, Ms. Perez denies any chest pain/pressure/tightness, shortness of breath at rest or with exertion, light headedness/dizziness, pre-syncope, syncope, lower extremity swelling, palpitations, paroxysmal nocturnal dyspnea (PND), or orthopnea.     Cardiac Problems and Cardiac Diagnostics     Most Recent Cardiac testing:      ECHO (report reviewed):   Echo results:   Results for orders placed during the hospital encounter of 08/20/20   Echo Complete [ECH10] 08/20/2020    Addendum   1.Left ventricle ejection fraction is normal. The calculated left  ventricular ejection fraction is 65%.     2.Moderate concentric left ventricle hypertrophy with moderate to severe  left ventricular outflow tract gradient due to this as well as associated  systolic anterior motion of mitral valve and chordal apparatus. Estimated  left ventricular for tract gradient 100 mmHg.     3.TAPSE is normal, which is consistent with normal right ventricular  systolic function.     4.The ascending aorta is mildly dilated.     5.No hemodynamically significant valvular heart abnormalities.     6.When compared to the previous study dated 6/19/2019, no significant  change. Prior echo read mild to moderate aortic stenosis, doubt true  stenosis but increased left ventricular outflow tract gradient secondary  to systolic anterior motion as well as moderate left ventricular  hypertrophy, should consider hypertrophic cardiomyopathy in differential.  Suggest cardiac MRI. Left ventricular outflow tract gradient was felt to  be similar on review of prior study.          Milena Villanueva MD 8/20/2020 11:50 AM                  Medications  Allergies   Current Outpatient Medications   Medication Sig Dispense Refill   ? cycloSPORINE (RESTASIS) 0.05 % ophthalmic emulsion Administer 1 drop to both eyes 2 (two) times a day.     ? irbesartan (AVAPRO) 300 MG  tablet TAKE ONE-HALF TABLET BY MOUTH DAILY 45 tablet 0   ? metoprolol succinate (TOPROL-XL) 50 MG 24 hr tablet Take 1 tablet (50 mg total) by mouth daily. 30 tablet 11   ? NON FORMULARY 1 capsule daily. Candibactrin     ? NON FORMULARY Take 2 capsules by mouth daily. Nutridyrr-stress essentials balance      ? NON FORMULARY Take 1 capsule by mouth daily. Alleerplex     ? NON FORMULARY Take 1 packet by mouth daily. imminocal Platinum     ? NON FORMULARY Take 1 capsule by mouth daily. Magnesium Lactate     ? omega 3-dha-epa-fish oil 100-160-1,000 mg cap 2 capsules in the morning and 1 capsule at night     ? sodium chloride (OCEAN) 0.65 % nasal spray Apply 1 spray into each nostril as needed for congestion.     ? SUMAtriptan (IMITREX) 50 MG tablet Take 1 tablet (50 mg total) by mouth every 2 (two) hours as needed for migraine. 10 tablet 3   ? turmeric root extract 500 mg cap Take 1 tablet by mouth daily.     ? calcium-vitamin D (CALCIUM-VITAMIN D) 500 mg(1,250mg) -200 unit per tablet Take 2 tablets by mouth daily.     ? hydrocortisone 1 % cream Apply topically 2 (two) times a day.       No current facility-administered medications for this visit.       Allergies   Allergen Reactions   ? Shellfish Containing Products Shortness Of Breath   ? Acetaminophen         Physical Examination Review of Systems   Vitals:    09/11/20 1401   BP: 136/80   Pulse: 72   Resp: 16     Body mass index is 35 kg/m .  Wt Readings from Last 3 Encounters:   09/11/20 196 lb (88.9 kg)   08/20/20 195 lb (88.5 kg)   07/29/20 195 lb (88.5 kg)       General Appearance:   Pleasant  female, appears stated age. no acute distress, overweight body habitus   ENT/Mouth: membranes moist, no apparent gingival bleeding.      EYES:  no scleral icterus, normal conjunctivae   Neck: no carotid bruits. supple   Respiratory:   lungs are clear to auscultation, no rales or wheezing, equal chest wall expansion    Cardiovascular:   Regular rhythm, normal rate. Normal  first and second heart sounds with 4/6 sytolic murmur at RUSB. no rubs or gallops; the carotid, radial and posterior tibial pulses are intact, Jugular venous pressure normal, no edema bilaterally    Abdomen/GI:  Soft, non-tender   Extremities: no cyanosis or clubbing   Skin: no xanthelasma, warm.    Heme/lymph/ Immunology No apparent bleeding noted.   Neurologic: Alert and oriented. normal gait, no tremors   Psychiatric: Pleasant, calm, appropriate affect.    A complete 10 system review of systems was performed and is negative except as mentioned in the HPI or below:  General: WNL  Eyes: WNL  Ears/Nose/Throat: WNL  Lungs: WNL  Heart: Shortness of Breath with activity  Stomach: Constipation  Bladder: WNL  Muscle/Joints: WNL  Skin: WNL  Nervous System: WNL  Mental Health: WNL     Blood: WNL       Past History   Past Medical History:   Patient Active Problem List    Diagnosis Date Noted   ? Migraine Headache    ? 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 Essential Hypertension    ? Aortic Stenosis        Past Surgical History:   Past Surgical History:   Procedure Laterality Date   ? LA EXCIS TENDON SHEATH LESION, HAND/FINGER      Description: Hand Excision Of A Tendon Cyst;  Recorded: 03/07/2012;   ? LA EXCISE BREAST CYST      Description: Breast Surgery Lumpectomy;  Recorded: 03/07/2012;  Comments: adenoma   ? LA EXCISION NOSE POLYP(S),EXTENSIVE      Description: Extensive Excision Of Nasal Polyps;  Recorded: 03/07/2012;   ? LA KNEE SCOPE,DIAGNOSTIC      Description: Arthroscopy Knee Left;  Recorded: 03/07/2012;   ? LA REMOVAL OF OVARY(S)      Description: Oophorectomy;  Recorded: 06/26/2013;   ? US GUIDED NEEDLE PLACEMENT  7/20/2020       Family History:   Family History   Problem Relation Age of Onset   ? Pacemaker Father    ? Sudden death Neg Hx     cousin possibly with HCM.    Social History:   Social History     Socioeconomic History   ? Marital status:      Spouse name: Not on file   ? Number of children: Not on  file   ? Years of education: Not on file   ? Highest education level: Not on file   Occupational History   ? Not on file   Social Needs   ? Financial resource strain: Not on file   ? Food insecurity     Worry: Not on file     Inability: Not on file   ? Transportation needs     Medical: Not on file     Non-medical: Not on file   Tobacco Use   ? Smoking status: Never Smoker   ? Smokeless tobacco: Never Used   Substance and Sexual Activity   ? Alcohol use: Not on file   ? Drug use: Not on file   ? Sexual activity: Not on file   Lifestyle   ? Physical activity     Days per week: Not on file     Minutes per session: Not on file   ? Stress: Not on file   Relationships   ? Social connections     Talks on phone: Not on file     Gets together: Not on file     Attends Sikhism service: Not on file     Active member of club or organization: Not on file     Attends meetings of clubs or organizations: Not on file     Relationship status: Not on file   ? Intimate partner violence     Fear of current or ex partner: Not on file     Emotionally abused: Not on file     Physically abused: Not on file     Forced sexual activity: Not on file   Other Topics Concern   ? Not on file   Social History Narrative   ? Not on file              Lab Results    Chemistry/lipid CBC Cardiac Enzymes/BNP/TSH/INR   Lab Results   Component Value Date    CHOL 225 (H) 07/29/2020    HDL 64 07/29/2020    LDLCALC 139 (H) 07/29/2020    TRIG 109 07/29/2020    CREATININE 0.74 07/29/2020    BUN 20 07/29/2020    K 4.0 07/29/2020     07/29/2020     07/29/2020    CO2 25 07/29/2020    Lab Results   Component Value Date    WBC 7.3 05/27/2020    HGB 14.4 05/27/2020    HCT 45.3 05/27/2020    MCV 89 05/27/2020     05/27/2020    Lab Results   Component Value Date    TSH 2.48 05/27/2020

## 2022-06-09 ENCOUNTER — TELEPHONE (OUTPATIENT)
Dept: CARDIOLOGY | Facility: CLINIC | Age: 72
End: 2022-06-09
Payer: COMMERCIAL

## 2022-06-09 NOTE — TELEPHONE ENCOUNTER
Left msg for patient requesting call back to address Rx refill for Metoprolol Tart 50mg BID - last visit note from Dr. Arnold on 1-31-22 reflects patient taking Metoprolol Succ 50mg BID.  mg

## 2022-06-09 NOTE — TELEPHONE ENCOUNTER
Please review medication update and address whether patient should continue taking Metoprolol Succ 50mg BID or Tartrate 50mg BID - any new orders?  mg

## 2022-06-09 NOTE — TELEPHONE ENCOUNTER
"Follow-up call to patient who confirmed she is presently taking Metoprolol Succ 50mg BID and was told by pharmacy that the only difference between Tartrate and Succinate was one needed to be taken with food.    Explained to patient that Metoprolol Succinate is long acting, Tartrate is short acting.    Informed her that Dr. Arnold's 1-31-22 visit note indicated she was taking Metoprolol Succinate which was last filled 3-10-22 - refill request from pharmacy was for Metoprolol Tartrate which was last listed on her med list at 7-26-21 visit - patient stated \"that may be that is why she feels more tired some days.\"    Patient was uncertain of when changes occurred but suggested it could have occurred after her heart surgery at [place] 5-26-21.    Patient confirmed she presently has Rx bottles for Tartrate and Succinate prescribed by Dr. Arnold - patient also reported that Rx's are on auto-fill and she had not requested refill for Metoprolol Tart and does not need refill for Succinate at this time.    Informed patient that Rx refill for Metoprolol Tart would be denied and that update would be forwarded to Dr. Arnold for any new recommendations - understanding verbalized.  mg  "

## 2022-06-10 NOTE — TELEPHONE ENCOUNTER
Return call to patient - informed her of Dr. Arnold's response/recommendations - patient verbalized understanding and confirmed again that she is presently taking Metoprolol Succ 50mg BID as reflected at last visit - follow-up sched on 7-22-22.  mg

## 2022-07-06 ENCOUNTER — TELEPHONE (OUTPATIENT)
Dept: CARDIOLOGY | Facility: CLINIC | Age: 72
End: 2022-07-06

## 2022-07-06 NOTE — TELEPHONE ENCOUNTER
Returning vmm from patient. Pt discussed having hives on fingers. Pt concerned it is her furosemide. Pt has been on furosemide 06/2021 without reactions. After further discussion with patient for possible allergens pt has started using a new hand soap. Discussed with patient to cease using the soap and continue using the anti-itch cream and observe if it clears up. Pt has follow up with Dr. Arnold on 07/22. No further questions or concerns noted. -JOLEEN

## 2022-07-22 ENCOUNTER — OFFICE VISIT (OUTPATIENT)
Dept: CARDIOLOGY | Facility: CLINIC | Age: 72
End: 2022-07-22
Payer: COMMERCIAL

## 2022-07-22 VITALS
RESPIRATION RATE: 17 BRPM | SYSTOLIC BLOOD PRESSURE: 98 MMHG | WEIGHT: 193.9 LBS | HEART RATE: 66 BPM | DIASTOLIC BLOOD PRESSURE: 66 MMHG | OXYGEN SATURATION: 95 % | BODY MASS INDEX: 34.35 KG/M2

## 2022-07-22 DIAGNOSIS — I10 ESSENTIAL HYPERTENSION: ICD-10-CM

## 2022-07-22 DIAGNOSIS — I50.32 CHRONIC DIASTOLIC CONGESTIVE HEART FAILURE (H): ICD-10-CM

## 2022-07-22 DIAGNOSIS — I42.2 HYPERTROPHIC CARDIOMYOPATHY (H): ICD-10-CM

## 2022-07-22 DIAGNOSIS — E87.6 HYPOKALEMIA: ICD-10-CM

## 2022-07-22 DIAGNOSIS — I48.0 PAROXYSMAL ATRIAL FIBRILLATION (H): ICD-10-CM

## 2022-07-22 PROCEDURE — 99214 OFFICE O/P EST MOD 30 MIN: CPT | Performed by: INTERNAL MEDICINE

## 2022-07-22 RX ORDER — METOPROLOL SUCCINATE 50 MG/1
50 TABLET, EXTENDED RELEASE ORAL 2 TIMES DAILY
Qty: 180 TABLET | Refills: 3 | Status: SHIPPED | OUTPATIENT
Start: 2022-07-22 | End: 2022-09-21

## 2022-07-22 RX ORDER — FUROSEMIDE 40 MG
40 TABLET ORAL DAILY
Qty: 90 TABLET | Refills: 3 | Status: SHIPPED | OUTPATIENT
Start: 2022-07-22 | End: 2023-01-12

## 2022-07-22 RX ORDER — POTASSIUM CHLORIDE 1500 MG/1
TABLET, EXTENDED RELEASE ORAL
Qty: 90 TABLET | Refills: 3 | Status: SHIPPED | OUTPATIENT
Start: 2022-07-22 | End: 2023-01-12

## 2022-07-22 NOTE — PROGRESS NOTES
Thank you, Diana Mahoney, for asking the [facility] team to see Ms. Romy Izquierdo to evaluate Follow Up (HCM)        Assessment/Recommendations   Assessment:    Hypertrophic cardiomyopathy with dynamic LVOT gradient -  Now s/p septal myectomy at [facility] on 5/26/21. Stable.  Hypertension - well-controlled.  Paroxysmal atrial fibrillation - with RVR - s/p surgical pulmonary vein isolation. No subsequent afib on ILR interrogation.  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 Has not been confirmed by CHARISMA. Remains on Eliquis.  Mild coronary artery disease with stenosis of 20-30% in all major coronary arteries - stable without angina.  Parsonage-monet syndrome with paralyzed right hemidiaphragm  chronic congestive heart failure with preserved LVEF - currently compensated    Plan:  Continue medications without changes.  Offered CHARISMA to confirm CARLOTTA closure and discontinuation of eliquis. She is considering this.  Also stated that if she desires she could try stopping furosemide and potassium and only taking them as needed. Watch carefully for fluid retention.  Follow up with me in 6 months or sooner if needed.         History of Present Illness   Ms. Romy Izquierdo is a 71 year old female with a significant past history of hypertension who presents for follow-up of HOCM and atrial fibrillation.      has hypertrophic obstructive cardiomyopathy with a prior echo performed on 8/20/2020 that demonstrated progression of the LVOT gradient, peaking at 100 mmHg, which is increased from 40 mmHg a year ago. This does not result in significant mitral valve regurgitation but asymmetric septal hypertrophy was noted. She has had one episode of syncope in her lifetime that was attributed to Parsonage-Monet syndrome. A cardiac MRI confirmed the diagnosis of HOCM. With an LVOT peak gradient of 80 mmHg and paroxysmal atrial fibrillation she was referred for septal myectomy.  "Her surgery occurred on 5/26/21 and consisted of a septal myectomy with pulmonary vein isolation and left atrial appendage excision. Her post-operative course was notable for paroxysmal atrial fibrillation treated with amiodarone for several weeks. She has not had recurrent afib and is monitored with an implantable loop recorder.    Today, Romy is feeling \"better than [she] has in a long time\". She is exercising regularly and able to swim 7 laps in her [employer] pool where several months ago she couldn't swim one lap. She is eating healthier. She now denies exertional symptoms or limitations.    Loop recorder interrogation from 5/9/22 revealed no significant arrhythmias or true pauses.    Other than noted above, Ms. Izquierdo denies any chest pain/pressure/tightness, shortness of breath at rest or with exertion, light headedness/dizziness, pre-syncope, syncope, lower extremity swelling, palpitations, paroxysmal nocturnal dyspnea (PND), or orthopnea.     Cardiac Problems and Cardiac Diagnostics     Most Recent Cardiac testing:  ECG dated 7/23/21 (personaly reviewed and interpreted): sinus rhythm with first degree AV block, left axis deviation and LBBB    Cardiac rhythm monitor 11/16/2020  CONCLUSION:  Symptoms of heart racing on 1 occasion associated with atrial flutter.   Symptoms of shortness of breath on 9 occasions, associated with sinus rhythm and  first-degree AV block. Paroxysmal atrial fibrillation was present on 2 days with a  total of 5 hours and 6 hours, respectively with average ventricular response of  approximately 85 beats per minute and peak ventricular response of approximately 110  beats per minute.  A single short run of accelerated idioventricular rhythm was  noted on auto transmissions.  Rather marked nocturnal sinus bradycardia with heart  rates in the 30s were noted on multiple days.     ECHO (report reviewed):   TTE 7/1/21    Normal left ventricular size with severe hypertrophy.    Left " ventricle ejection fraction is normal. The calculated left ventricular ejection fraction is 64%.    Normal right ventricular size and systolic function.    Systolic anterior motion of the anterior leaflet of the mitral valve is noted without significant mitral regurgitation.    The peak gradient across the LVOT and aortic valve is 11 mmHg without evidence of dynamic LVOT obstruction.    A left pleural effusion and trivial pericardial effusion are noted.    When compared to the previous study dated 1/7/2021, a dynamic LVOT obstruction is no longer present. The peak outflow tract gradient has reduced from 81 to 11 mmHg. The pleural and pericardial effusions are new.     Cardiac MRI 9/21/2020  IMPRESSIONS:    1.  Normal left ventricular size, thickened septum with maximal measurement 18-20 mm, inferolateral wall 7-8 mm. The quantified left ventricular ejection fraction is 69%. It appears less deformation of thickened septum per tagging images. There is no rest perfusion defect. It is evident that there is LVOT obstruction by flow turbulence and EMELYN.  The late delayed gadolinium enhancement study demonstrated that there are patchy gadolinium enhancement in thickened basal septal segment and also in basal lateral segment. Put all together, the findings are consistent with hypertrophic cardiomyopathy.   2.  Normal right ventricular size function.    3.  Mildly enlarged both atria.  4.  Moderate mitral valve regurgitation.  5.  Mildly dilated mid ascending aorta 40 mm.     Cardiac cath: from 5/25/21 at [facility] demonstrated   1.  CORONARY ANGIOGRAPHY   FINAL DIAGNOSIS   1.  Mild coronary artery atherosclerosis   PRE-PROCEDURE DIAGNOSIS   1.  Cardiomyopathy Hypertrophic (HCC)   CORONARY DIAGNOSTIC SUMMARY   Coronary artery dominance is right. Normal right coronary.   The left main coronary artery is 30% obstructed by a discrete lesion and 20% obstructed by a discrete lesion.   The proximal left anterior descending artery  is 20% obstructed by a discrete lesion.   The middle left anterior descending artery is 20% obstructed by a tubular lesion.   The distal left anterior descending artery is 20% obstructed by a discrete lesion.   Noted systolic compression of septal  arteries, likely consistent with patient's known hypertrophic cardiomyopathy.          Medications  Allergies   Current Outpatient Medications   Medication Sig Dispense Refill     apixaban ANTICOAGULANT (ELIQUIS ANTICOAGULANT) 5 MG tablet Take 1 tablet (5 mg) by mouth 2 times daily 180 tablet 3     Biotin 10 MG CAPS Take 1 capsule by mouth daily       Cetaphil Moisturizing (CETAPHIL) external lotion Apply 1 Application topically daily       cycloSPORINE (RESTASIS) 0.05 % ophthalmic emulsion Place 1 drop into both eyes       furosemide (LASIX) 40 MG tablet Take 1 tablet (40 mg) by mouth daily 90 tablet 3     metoprolol succinate ER (TOPROL XL) 50 MG 24 hr tablet Take 1 tablet (50 mg) by mouth 2 times daily 180 tablet 3     multivitamin (CENTRUM SILVER) tablet Take 2 tablets by mouth daily       Omega-3 Fatty Acids (RA FISH OIL) 1000 MG CAPS 2 capsules in the morning and 1 capsule at night       potassium chloride ER (KLOR-CON M) 20 MEQ CR tablet TAKE 1 TABLET(20 MEQ) BY MOUTH DAILY 90 tablet 3     sodium chloride (OCEAN) 0.65 % nasal spray 1 spray daily as needed       SUMAtriptan (IMITREX) 50 MG tablet Take 50 mg by mouth as needed       Turmeric Curcumin 500 MG CAPS Take 1 tablet by mouth daily       calcium carbonate-vitamin D (OSCAL W/D) 500-200 MG-UNIT tablet Take 2 tablets by mouth (Patient not taking: Reported on 7/22/2022)        Allergies   Allergen Reactions     Shellfish Allergy Shortness Of Breath     Midazolam Hives     Welts     Acetaminophen      Dust Mites Other (See Comments)     Stuffy nose     Molds & Smuts      Other reaction(s): Wheezing  [place] blue grass,dust     Adhesive Tape Rash        Physical Examination Review of Systems   Vitals:  BP 98/66 (BP Location: Right arm, Patient Position: Sitting, Cuff Size: Adult Large)   Pulse 66   Resp 17   Wt 88 kg (193 lb 14.4 oz)   SpO2 95%   BMI 34.35 kg/m    BMI= Body mass index is 34.35 kg/m .  Wt Readings from Last 3 Encounters:   07/22/22 88 kg (193 lb 14.4 oz)   01/31/22 86.2 kg (190 lb)   07/26/21 81.6 kg (180 lb)       General Appearance:   Pleasant female, appears stated age. no acute distress, overweight body habitus   ENT/Mouth: membranes moist, no apparent gingival bleeding.      EYES:  no scleral icterus, normal conjunctivae   Neck: supple   Respiratory:   lungs are clear to auscultation, no rales or wheezing, equal chest wall expansion    Cardiovascular:   Regular rhythm, normal rate. Normal first and second heart sounds with 2/6 systolic murmur at upper sternal border. no rubs or gallops; Jugular venous pressure normal, no edema bilaterally    Extremities: no cyanosis or clubbing   Skin: no xanthelasma, warm.    Heme/lymph/ Immunology No apparent bleeding noted.   Neurologic: Alert and oriented. normal gait, no tremors   Psychiatric: Pleasant, calm, appropriate affect.         Please refer above for cardiac ROS details.       Past History   Past Medical History:   Past Medical History:   Diagnosis Date     Hypertrophic cardiomyopathy (H) 1/15/2021    Severe outflow tract obstruction Preserved LV systolic performance Basal septum: 20 mm Gadolinium enhancement positive: Basal septum No major positive and no other minor positive risk factors identified.     Paroxysmal atrial fibrillation (H) 1/15/2021    Dx Nov 2020 (NAIF) [id number] score = 3 Rx apixaban     Parsonage-Mcknight syndrome         Past Surgical History:   Past Surgical History:   Procedure Laterality Date     EP LOOP RECORDER IMPLANT N/A 1/29/2021    Procedure: EP Loop Recorder Insertion;  Surgeon: Paul Garza MD;  Location: [facility] Cardiac Cath Lab;  Service: Cardiology     EXCISE BREAST CYST/FIBROADENOMA/TUMOR/DUCT  LESION/NIPPLE LESION/AREOLAR LESION      Description: Breast Surgery Lumpectomy;  Recorded: 03/07/2012;  Comments: adenoma     HC EXCISION NASAL POLYP(S), EXTENSIVE      Description: Extensive Excision Of Nasal Polyps;  Recorded: 03/07/2012;     HC KNEE SCOPE, DIAGNOSTIC      Description: Arthroscopy Knee Left;  Recorded: 03/07/2012;     IA EXCIS TENDON SHEATH LESION, HAND/FINGER      Description: Hand Excision Of A Tendon Cyst;  Recorded: 03/07/2012;     US GUIDED NEEDLE PLACEMENT  7/20/2020     ZZC REMOVAL OF OVARY(S)      Description: Oophorectomy;  Recorded: 06/26/2013;        Family History:   Family History   Problem Relation Age of Onset     Pacemaker Father      Coronary Artery Disease Father 65.00     Colon Cancer Father         XRT and chemo     Heart Failure Father 79.00     Cerebrovascular Disease Mother 89.00     Hypertension Mother      Endometrial Cancer Mother      Other - See Comments Mother         Scarlet fever vs rheumatic fever     Other - See Comments Cousin 16.00        Drown, Hypertrophic (apparently via father - not realted)     Cardiomyopathy Cousin         Hypertrophic (apparently via father - not realted)     Cardiomyopathy Child         Hypertrophic (apparently via father - not realted)     Sudden Death No family hx of         Social History:   Social History     Socioeconomic History     Marital status:      Spouse name: Not on file     Number of children: Not on file     Years of education: Not on file     Highest education level: Not on file   Occupational History     Not on file   Tobacco Use     Smoking status: Never Smoker     Smokeless tobacco: Never Used   Substance and Sexual Activity     Alcohol use: Never     Drug use: Never     Sexual activity: Not on file   Other Topics Concern     Not on file   Social History Narrative     Not on file     Social Determinants of Health     Financial Resource Strain: Not on file   Food Insecurity: Not on file   Transportation Needs:  Not on file   Physical Activity: Not on file   Stress: Not on file   Social Connections: Not on file   Intimate Partner Violence: Not on file   Housing Stability: Not on file            Lab Results    Chemistry/lipid CBC Cardiac Enzymes/BNP/TSH/INR   Lab Results   Component Value Date    CHOL 225 (H) 07/29/2020    HDL 64 07/29/2020    TRIG 109 07/29/2020    BUN 21 07/23/2021     07/23/2021    CO2 30 07/23/2021    Lab Results   Component Value Date    WBC 8.0 07/23/2021    HGB 12.4 07/23/2021    HCT 40.3 07/23/2021    MCV 88 07/23/2021     07/23/2021    Lab Results   Component Value Date    TROPONINI 0.03 07/23/2021     (H) 07/23/2021    TSH 2.48 05/27/2020

## 2022-07-22 NOTE — PATIENT INSTRUCTIONS
It was a pleasure to meet with you today.      Below is a summary of your visit.   Continue your medications without changes.  If you want to stop taking Eliquis I would recommend a CHARISMA (ultrasound of your heart where the probe is in your throat under sedation) to confirm that the atrial appendage was successfully ligated.  Continue your regular exercise  Follow up in 6 months.       Please do not hesitate to call the [facility] clinic with any questions or concerns at [phone].     Sincerely,

## 2022-07-22 NOTE — LETTER
7/22/2022    Diana Coello, CLIVE  [address] [facility] [address]    RE: Romy Izquierdo       Dear Colleague,     I had the pleasure of seeing Romy Izquierdo in the [facility].      Thank you, Diana Mahoney, for asking the [facility] team to see Ms. Romy Izquierdo to evaluate Follow Up (HCM)        Assessment/Recommendations   Assessment:    1. Hypertrophic cardiomyopathy with dynamic LVOT gradient -  Now s/p septal myectomy at [facility] on 5/26/21. Stable.  2. Hypertension - well-controlled.  3. Paroxysmal atrial fibrillation - with RVR - s/p surgical pulmonary vein isolation. No subsequent afib on ILR interrogation.  4. 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 Has not been confirmed by CHARISMA. Remains on Eliquis.  5. Mild coronary artery disease with stenosis of 20-30% in all major coronary arteries - stable without angina.  6. Parsonage-monet syndrome with paralyzed right hemidiaphragm  7. chronic congestive heart failure with preserved LVEF - currently compensated    Plan:  1. Continue medications without changes.  2. Offered CHARISMA to confirm CARLOTTA closure and discontinuation of eliquis. She is considering this.  3. Also stated that if she desires she could try stopping furosemide and potassium and only taking them as needed. Watch carefully for fluid retention.  4. Follow up with me in 6 months or sooner if needed.         History of Present Illness   Ms. Romy Izquierdo is a 71 year old female with a significant past history of hypertension who presents for follow-up of HOCM and atrial fibrillation.      has hypertrophic obstructive cardiomyopathy with a prior echo performed on 8/20/2020 that demonstrated progression of the LVOT gradient, peaking at 100 mmHg, which is increased from 40 mmHg a year ago. This does not result in significant mitral valve regurgitation but asymmetric septal hypertrophy was  "noted. 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 A cardiac MRI confirmed the diagnosis of HOCM. With an LVOT peak gradient of 80 mmHg and paroxysmal atrial fibrillation she was referred for septal myectomy. Her surgery occurred on 5/26/21 and consisted of a septal myectomy with pulmonary vein isolation and left atrial appendage excision. Her post-operative course was notable for paroxysmal atrial fibrillation treated with amiodarone for several weeks. She has not had recurrent afib and is monitored with an implantable loop recorder.    Today, Romy is feeling \"better than [she] has in a long time\". She is exercising regularly and able to swim 7 laps in her [place] pool where several months ago she couldn't swim one lap. She is eating healthier. She now denies exertional symptoms or limitations.    Loop recorder interrogation from 5/9/22 revealed no significant arrhythmias or true pauses.    Other than noted above, Ms. Izquierdo denies any chest pain/pressure/tightness, shortness of breath at rest or with exertion, light headedness/dizziness, pre-syncope, syncope, lower extremity swelling, palpitations, paroxysmal nocturnal dyspnea (PND), or orthopnea.     Cardiac Problems and Cardiac Diagnostics     Most Recent Cardiac testing:  ECG dated 7/23/21 (personaly reviewed and interpreted): sinus rhythm with first degree AV block, left axis deviation and LBBB    Cardiac rhythm monitor 11/16/2020  CONCLUSION:  Symptoms of heart racing on 1 occasion associated with atrial flutter.   Symptoms of shortness of breath on 9 occasions, associated with sinus rhythm and  first-degree AV block. Paroxysmal atrial fibrillation was present on 2 days with a  total of 5 hours and 6 hours, respectively with average ventricular response of  approximately 85 beats per minute and peak ventricular response of approximately 110  beats per minute.  A single short run of accelerated " idioventricular rhythm was  noted on auto transmissions.  Rather marked nocturnal sinus bradycardia with heart  rates in the 30s were noted on multiple days.     ECHO (report reviewed):   TTE 7/1/21    Normal left ventricular size with severe hypertrophy.    Left ventricle ejection fraction is normal. The calculated left ventricular ejection fraction is 64%.    Normal right ventricular size and systolic function.    Systolic anterior motion of the anterior leaflet of the mitral valve is noted without significant mitral regurgitation.    The peak gradient across the LVOT and aortic valve is 11 mmHg without evidence of dynamic LVOT obstruction.    A left pleural effusion and trivial pericardial effusion are noted.    When compared to the previous study dated 1/7/2021, a dynamic LVOT obstruction is no longer present. The peak outflow tract gradient has reduced from 81 to 11 mmHg. The pleural and pericardial effusions are new.     Cardiac MRI 9/21/2020  IMPRESSIONS:    1.  Normal left ventricular size, thickened septum with maximal measurement 18-20 mm, inferolateral wall 7-8 mm. The quantified left ventricular ejection fraction is 69%. It appears less deformation of thickened septum per tagging images. There is no rest perfusion defect. It is evident that there is LVOT obstruction by flow turbulence and EMELYN.  The late delayed gadolinium enhancement study demonstrated that there are patchy gadolinium enhancement in thickened basal septal segment and also in basal lateral segment. Put all together, the findings are consistent with hypertrophic cardiomyopathy.   2.  Normal right ventricular size function.    3.  Mildly enlarged both atria.  4.  Moderate mitral valve regurgitation.  5.  Mildly dilated mid ascending aorta 40 mm.     Cardiac cath: from 5/25/21 at [facility] demonstrated   1.  CORONARY ANGIOGRAPHY   FINAL DIAGNOSIS   1.  Mild coronary artery atherosclerosis   PRE-PROCEDURE DIAGNOSIS   1.  Cardiomyopathy  Hypertrophic (HCC)   CORONARY DIAGNOSTIC SUMMARY   Coronary artery dominance is right. Normal right coronary.   The left main coronary artery is 30% obstructed by a discrete lesion and 20% obstructed by a discrete lesion.   The proximal left anterior descending artery is 20% obstructed by a discrete lesion.   The middle left anterior descending artery is 20% obstructed by a tubular lesion.   The distal left anterior descending artery is 20% obstructed by a discrete lesion.   Noted systolic compression of septal  arteries, likely consistent with patient's known hypertrophic cardiomyopathy.          Medications  Allergies   Current Outpatient Medications   Medication Sig Dispense Refill     apixaban ANTICOAGULANT (ELIQUIS ANTICOAGULANT) 5 MG tablet Take 1 tablet (5 mg) by mouth 2 times daily 180 tablet 3     Biotin 10 MG CAPS Take 1 capsule by mouth daily       Cetaphil Moisturizing (CETAPHIL) external lotion Apply 1 Application topically daily       cycloSPORINE (RESTASIS) 0.05 % ophthalmic emulsion Place 1 drop into both eyes       furosemide (LASIX) 40 MG tablet Take 1 tablet (40 mg) by mouth daily 90 tablet 3     metoprolol succinate ER (TOPROL XL) 50 MG 24 hr tablet Take 1 tablet (50 mg) by mouth 2 times daily 180 tablet 3     multivitamin (CENTRUM SILVER) tablet Take 2 tablets by mouth daily       Omega-3 Fatty Acids (RA FISH OIL) 1000 MG CAPS 2 capsules in the morning and 1 capsule at night       potassium chloride ER (KLOR-CON M) 20 MEQ CR tablet TAKE 1 TABLET(20 MEQ) BY MOUTH DAILY 90 tablet 3     sodium chloride (OCEAN) 0.65 % nasal spray 1 spray daily as needed       SUMAtriptan (IMITREX) 50 MG tablet Take 50 mg by mouth as needed       Turmeric Curcumin 500 MG CAPS Take 1 tablet by mouth daily       calcium carbonate-vitamin D (OSCAL W/D) 500-200 MG-UNIT tablet Take 2 tablets by mouth (Patient not taking: Reported on 7/22/2022)        Allergies   Allergen Reactions     Shellfish Allergy Shortness  Of Breath     Midazolam Hives     Welts     Acetaminophen      Dust Mites Other (See Comments)     Stuffy nose     Molds & Smuts      Other reaction(s): Wheezing  [place] blue grass,dust     Adhesive Tape Rash        Physical Examination Review of Systems   Vitals: BP 98/66 (BP Location: Right arm, Patient Position: Sitting, Cuff Size: Adult Large)   Pulse 66   Resp 17   Wt 88 kg (193 lb 14.4 oz)   SpO2 95%   BMI 34.35 kg/m    BMI= Body mass index is 34.35 kg/m .  Wt Readings from Last 3 Encounters:   07/22/22 88 kg (193 lb 14.4 oz)   01/31/22 86.2 kg (190 lb)   07/26/21 81.6 kg (180 lb)       General Appearance:   Pleasant female, appears stated age. no acute distress, overweight body habitus   ENT/Mouth: membranes moist, no apparent gingival bleeding.      EYES:  no scleral icterus, normal conjunctivae   Neck: supple   Respiratory:   lungs are clear to auscultation, no rales or wheezing, equal chest wall expansion    Cardiovascular:   Regular rhythm, normal rate. Normal first and second heart sounds with 2/6 systolic murmur at upper sternal border. no rubs or gallops; Jugular venous pressure normal, no edema bilaterally    Extremities: no cyanosis or clubbing   Skin: no xanthelasma, warm.    Heme/lymph/ Immunology No apparent bleeding noted.   Neurologic: Alert and oriented. normal gait, no tremors   Psychiatric: Pleasant, calm, appropriate affect.         Please refer above for cardiac ROS details.       Past History   Past Medical History:   Past Medical History:   Diagnosis Date     Hypertrophic cardiomyopathy (H) 1/15/2021    Severe outflow tract obstruction Preserved LV systolic performance Basal septum: 20 mm Gadolinium enhancement positive: Basal septum No major positive and no other minor positive risk factors identified.     Paroxysmal atrial fibrillation (H) 1/15/2021    Dx Nov 2020 (NAIF) [id number] score = 3 Rx apixaban     Parsonage-Mcknight syndrome         Past Surgical History:   Past Surgical  History:   Procedure Laterality Date     EP LOOP RECORDER IMPLANT N/A 1/29/2021    Procedure: EP Loop Recorder Insertion;  Surgeon: Paul Garza MD;  Location: [facility] Cardiac Cath Lab;  Service: Cardiology     EXCISE BREAST CYST/FIBROADENOMA/TUMOR/DUCT LESION/NIPPLE LESION/AREOLAR LESION      Description: Breast Surgery Lumpectomy;  Recorded: 03/07/2012;  Comments: adenoma     HC EXCISION NASAL POLYP(S), EXTENSIVE      Description: Extensive Excision Of Nasal Polyps;  Recorded: 03/07/2012;     HC KNEE SCOPE, DIAGNOSTIC      Description: Arthroscopy Knee Left;  Recorded: 03/07/2012;     OH EXCIS TENDON SHEATH LESION, HAND/FINGER      Description: Hand Excision Of A Tendon Cyst;  Recorded: 03/07/2012;     US GUIDED NEEDLE PLACEMENT  7/20/2020     ZZC REMOVAL OF OVARY(S)      Description: Oophorectomy;  Recorded: 06/26/2013;        Family History:   Family History   Problem Relation Age of Onset     Pacemaker Father      Coronary Artery Disease Father 65.00     Colon Cancer Father         XRT and chemo     Heart Failure Father 79.00     Cerebrovascular Disease Mother 89.00     Hypertension Mother      Endometrial Cancer Mother      Other - See Comments Mother         Scarlet fever vs rheumatic fever     Other - See Comments Cousin 16.00        Drown, Hypertrophic (apparently via father - not realted)     Cardiomyopathy Cousin         Hypertrophic (apparently via father - not realted)     Cardiomyopathy Child         Hypertrophic (apparently via father - not realted)     Sudden Death No family hx of         Social History:   Social History     Socioeconomic History     Marital status:      Spouse name: Not on file     Number of children: Not on file     Years of education: Not on file     Highest education level: Not on file   Occupational History     Not on file   Tobacco Use     Smoking status: Never Smoker     Smokeless tobacco: Never Used   Substance and Sexual Activity     Alcohol use: Never     Drug  use: Never     Sexual activity: Not on file   Other Topics Concern     Not on file   Social History Narrative     Not on file     Social Determinants of Health     Financial Resource Strain: Not on file   Food Insecurity: Not on file   Transportation Needs: Not on file   Physical Activity: Not on file   Stress: Not on file   Social Connections: Not on file   Intimate Partner Violence: Not on file   Housing Stability: Not on file            Lab Results    Chemistry/lipid CBC Cardiac Enzymes/BNP/TSH/INR   Lab Results   Component Value Date    CHOL 225 (H) 07/29/2020    HDL 64 07/29/2020    TRIG 109 07/29/2020    BUN 21 07/23/2021     07/23/2021    CO2 30 07/23/2021    Lab Results   Component Value Date    WBC 8.0 07/23/2021    HGB 12.4 07/23/2021    HCT 40.3 07/23/2021    MCV 88 07/23/2021     07/23/2021    Lab Results   Component Value Date    TROPONINI 0.03 07/23/2021     (H) 07/23/2021    TSH 2.48 05/27/2020                Thank you for allowing me to participate in the care of your patient.      Sincerely,     Hossein Arnold MD     [facility]  cc:   Hossein Arnold MD  [address] [place] [address]

## 2022-08-04 ENCOUNTER — TRANSFERRED RECORDS (OUTPATIENT)
Dept: HEALTH INFORMATION MANAGEMENT | Facility: CLINIC | Age: 72
End: 2022-08-04

## 2022-08-04 LAB
ALT SERPL-CCNC: 16 U/L (ref 6–29)
AST SERPL-CCNC: 20 U/L (ref 10–35)
CREATININE (EXTERNAL): 0.83 MG/DL (ref 0.6–1)
GFR ESTIMATED (EXTERNAL): 76 ML/MIN/1.73M2
GLUCOSE (EXTERNAL): 85 MG/DL (ref 65–99)
POTASSIUM (EXTERNAL): 3.7 MMOL/L (ref 3.5–5.3)

## 2022-08-22 ENCOUNTER — TELEPHONE (OUTPATIENT)
Dept: CARDIOLOGY | Facility: CLINIC | Age: 72
End: 2022-08-22

## 2022-08-22 DIAGNOSIS — I48.0 PAROXYSMAL ATRIAL FIBRILLATION (H): Primary | ICD-10-CM

## 2022-08-22 NOTE — TELEPHONE ENCOUNTER
Spoke with patient regarding vmm patient.    CARLOTTA closure was done on 05/26/2021. Sewn shut not stapled. Stayed on eliquis due to afib. Further cares deferred to . in last office visit 07/22 CHARISMA was offered.     08/29 recorder will given readings. Advised patient CHARISMA is still needed to confirm closure of CARLOTTA.     Orders placed. Provided contact information for scheduling-JOLEEN

## 2022-08-23 ENCOUNTER — TELEPHONE (OUTPATIENT)
Dept: CARDIOLOGY | Facility: CLINIC | Age: 72
End: 2022-08-23

## 2022-08-23 DIAGNOSIS — I48.0 PAROXYSMAL ATRIAL FIBRILLATION (H): Primary | ICD-10-CM

## 2022-08-23 NOTE — TELEPHONE ENCOUNTER
Regarding recommendations from Dr. Arnold. Orders placed for ct scan. Message sent to scheduling team. -no further questions or concerns.

## 2022-08-23 NOTE — TELEPHONE ENCOUNTER
Received vmm from patient regarding the CHARISMA.     CARLOTTA closure was done on 05/26/2021. Patient indicated CARLOTTA was Sewn shut not stapled. Stayed on eliquis due to afib. Patient indicated further cares deferred to  per [facility]. in last office visit 07/22 CHARISMA was offered.         Patient requested a CT of the pulmonary vein in lieu of the CHARISMA to determine closure. Will forward to Dr. Arnold for review/orders.-JOLEEN

## 2022-08-23 NOTE — TELEPHONE ENCOUNTER
From: Hossein Arnold MD  Sent: 8/23/2022   4:33 PM CDT  To: Magui Conner    We can use a CT pulmonary vein study to evaluate closure of the CARLOTTA.  Thank you  JACQUIE

## 2022-08-24 ENCOUNTER — TELEPHONE (OUTPATIENT)
Dept: CARDIOLOGY | Facility: CLINIC | Age: 72
End: 2022-08-24

## 2022-08-24 NOTE — TELEPHONE ENCOUNTER
Patient called in, forgot to discuss blood clots in yesterday's telephone call    Educated patient on blood clots verses bruising. Pt expressed understanding. Pt not experiencing symptoms.    No further questions or concerns noted. -JOLEEN

## 2022-08-29 ENCOUNTER — ANCILLARY PROCEDURE (OUTPATIENT)
Dept: CARDIOLOGY | Facility: CLINIC | Age: 72
End: 2022-08-29
Attending: INTERNAL MEDICINE
Payer: COMMERCIAL

## 2022-08-29 DIAGNOSIS — Z98.890 HISTORY OF LOOP RECORDER: ICD-10-CM

## 2022-08-29 DIAGNOSIS — I42.9 CARDIOMYOPATHY (H): ICD-10-CM

## 2022-08-29 PROCEDURE — 93297 REM INTERROG DEV EVAL ICPMS: CPT | Performed by: INTERNAL MEDICINE

## 2022-08-29 PROCEDURE — G2066 INTER DEVC REMOTE 30D: HCPCS | Performed by: INTERNAL MEDICINE

## 2022-09-02 ENCOUNTER — HOSPITAL ENCOUNTER (OUTPATIENT)
Dept: CT IMAGING | Facility: CLINIC | Age: 72
Discharge: HOME | End: 2022-09-02
Attending: INTERNAL MEDICINE | Admitting: INTERNAL MEDICINE
Payer: COMMERCIAL

## 2022-09-02 DIAGNOSIS — I48.0 PAROXYSMAL ATRIAL FIBRILLATION (H): ICD-10-CM

## 2022-09-02 LAB
BSA FOR ECHO PROCEDURE: 1.91 M2
CCTA ASCENDING AORTA: 4
CCTA SINUS: 3.7
CREAT BLD-MCNC: 0.9 MG/DL (ref 0.6–1.1)
GFR SERPL CREATININE-BSD FRML MDRD: >60 ML/MIN/1.73M2

## 2022-09-02 PROCEDURE — 75572 CT HRT W/3D IMAGE: CPT | Mod: 26 | Performed by: GENERAL ACUTE CARE HOSPITAL

## 2022-09-02 PROCEDURE — 75572 CT HRT W/3D IMAGE: CPT

## 2022-09-02 PROCEDURE — 82565 ASSAY OF CREATININE: CPT

## 2022-09-02 PROCEDURE — 250N000011 HC RX IP 250 OP 636: Performed by: INTERNAL MEDICINE

## 2022-09-02 RX ORDER — IOPAMIDOL 755 MG/ML
120 INJECTION, SOLUTION INTRAVASCULAR ONCE
Status: COMPLETED | OUTPATIENT
Start: 2022-09-02 | End: 2022-09-02

## 2022-09-02 RX ADMIN — IOPAMIDOL 120 ML: 755 INJECTION, SOLUTION INTRAVENOUS at 15:17

## 2022-09-04 LAB
MDC_IDC_EPISODE_DTM: NORMAL
MDC_IDC_EPISODE_DURATION: 10.21 S
MDC_IDC_EPISODE_DURATION: 10.34 S
MDC_IDC_EPISODE_DURATION: 10.5 S
MDC_IDC_EPISODE_DURATION: 12.65 S
MDC_IDC_EPISODE_DURATION: 13.29 S
MDC_IDC_EPISODE_DURATION: 27.56 S
MDC_IDC_EPISODE_DURATION: 3.05 S
MDC_IDC_EPISODE_DURATION: 3.06 S
MDC_IDC_EPISODE_DURATION: 3.08 S
MDC_IDC_EPISODE_DURATION: 3.27 S
MDC_IDC_EPISODE_DURATION: 3.33 S
MDC_IDC_EPISODE_DURATION: 3.35 S
MDC_IDC_EPISODE_DURATION: 3.55 S
MDC_IDC_EPISODE_DURATION: 3.57 S
MDC_IDC_EPISODE_DURATION: 3.67 S
MDC_IDC_EPISODE_DURATION: 3.81 S
MDC_IDC_EPISODE_DURATION: 3.83 S
MDC_IDC_EPISODE_DURATION: 4.08 S
MDC_IDC_EPISODE_DURATION: 4.11 S
MDC_IDC_EPISODE_DURATION: 4.31 S
MDC_IDC_EPISODE_DURATION: 4.48 S
MDC_IDC_EPISODE_DURATION: 4.87 S
MDC_IDC_EPISODE_DURATION: 5.48 S
MDC_IDC_EPISODE_DURATION: 5.81 S
MDC_IDC_EPISODE_DURATION: 6.15 S
MDC_IDC_EPISODE_DURATION: 6.19 S
MDC_IDC_EPISODE_DURATION: 6.2 S
MDC_IDC_EPISODE_DURATION: 6.25 S
MDC_IDC_EPISODE_DURATION: 6.55 S
MDC_IDC_EPISODE_DURATION: 9.21 S
MDC_IDC_EPISODE_ID: 286
MDC_IDC_EPISODE_ID: 287
MDC_IDC_EPISODE_ID: 288
MDC_IDC_EPISODE_ID: 289
MDC_IDC_EPISODE_ID: 290
MDC_IDC_EPISODE_ID: 291
MDC_IDC_EPISODE_ID: 292
MDC_IDC_EPISODE_ID: 293
MDC_IDC_EPISODE_ID: 294
MDC_IDC_EPISODE_ID: 295
MDC_IDC_EPISODE_ID: 296
MDC_IDC_EPISODE_ID: 297
MDC_IDC_EPISODE_ID: 298
MDC_IDC_EPISODE_ID: 299
MDC_IDC_EPISODE_ID: 300
MDC_IDC_EPISODE_ID: 301
MDC_IDC_EPISODE_ID: 302
MDC_IDC_EPISODE_ID: 303
MDC_IDC_EPISODE_ID: 304
MDC_IDC_EPISODE_ID: 305
MDC_IDC_EPISODE_ID: 306
MDC_IDC_EPISODE_ID: 307
MDC_IDC_EPISODE_ID: 308
MDC_IDC_EPISODE_ID: 309
MDC_IDC_EPISODE_ID: 310
MDC_IDC_EPISODE_ID: 311
MDC_IDC_EPISODE_ID: 312
MDC_IDC_EPISODE_ID: 313
MDC_IDC_EPISODE_ID: 314
MDC_IDC_EPISODE_ID: 315
MDC_IDC_EPISODE_TYPE: NORMAL
MDC_IDC_MSMT_BATTERY_STATUS: NORMAL
MDC_IDC_PG_IMPLANT_DTM: NORMAL
MDC_IDC_PG_MFG: NORMAL
MDC_IDC_PG_MODEL: NORMAL
MDC_IDC_PG_SERIAL: NORMAL
MDC_IDC_PG_TYPE: NORMAL
MDC_IDC_SESS_CLINIC_NAME: NORMAL
MDC_IDC_SESS_DTM: NORMAL
MDC_IDC_SESS_TYPE: NORMAL
MDC_IDC_SET_ZONE_DETECTION_INTERVAL: 2000 MS
MDC_IDC_SET_ZONE_DETECTION_INTERVAL: 3000 MS
MDC_IDC_SET_ZONE_DETECTION_INTERVAL: 370 MS
MDC_IDC_SET_ZONE_TYPE: NORMAL
MDC_IDC_STAT_AT_BURDEN_PERCENT: 0 %
MDC_IDC_STAT_AT_DTM_END: NORMAL
MDC_IDC_STAT_AT_DTM_START: NORMAL
MDC_IDC_STAT_EPISODE_RECENT_COUNT: 0
MDC_IDC_STAT_EPISODE_RECENT_COUNT: 145
MDC_IDC_STAT_EPISODE_RECENT_COUNT_DTM_END: NORMAL
MDC_IDC_STAT_EPISODE_RECENT_COUNT_DTM_START: NORMAL
MDC_IDC_STAT_EPISODE_TOTAL_COUNT: 0
MDC_IDC_STAT_EPISODE_TOTAL_COUNT: 1
MDC_IDC_STAT_EPISODE_TOTAL_COUNT: 281
MDC_IDC_STAT_EPISODE_TOTAL_COUNT: 33
MDC_IDC_STAT_EPISODE_TOTAL_COUNT_DTM_END: NORMAL
MDC_IDC_STAT_EPISODE_TOTAL_COUNT_DTM_START: NORMAL
MDC_IDC_STAT_EPISODE_TYPE: NORMAL

## 2022-09-10 NOTE — TELEPHONE ENCOUNTER
Left message for the patient relaying message below from Dr. Sams regarding requested refill.  Asked that she call with any further questions.  Dena CATES CMA/SABRA....................3:53 PM     · Inserted due to history of Vfib/V-tach cardiac arrest June 2022

## 2022-09-21 DIAGNOSIS — I42.2 HYPERTROPHIC CARDIOMYOPATHY (H): ICD-10-CM

## 2022-09-21 DIAGNOSIS — I50.32 CHRONIC DIASTOLIC CONGESTIVE HEART FAILURE (H): ICD-10-CM

## 2022-09-21 RX ORDER — METOPROLOL SUCCINATE 50 MG/1
TABLET, EXTENDED RELEASE ORAL
Qty: 180 TABLET | Refills: 3 | Status: SHIPPED | OUTPATIENT
Start: 2022-09-21 | End: 2023-12-05

## 2022-10-11 DIAGNOSIS — G43.709 CHRONIC MIGRAINE WITHOUT AURA WITHOUT STATUS MIGRAINOSUS, NOT INTRACTABLE: Primary | ICD-10-CM

## 2022-10-11 NOTE — TELEPHONE ENCOUNTER
"Outpatient Medication Detail     Disp Refills Start End HENRY   SUMAtriptan (IMITREX) 50 MG tablet 12 tablet 0 3/26/2021  No   Sig - Route: Take 1 tablet (50 mg total) by mouth every 2 (two) hours as needed for migraine. - Oral   Sent to pharmacy as: SUMAtriptan 50 mg tablet (IMITREX)   E-Prescribing Status: Receipt confirmed by pharmacy (3/26/2021 12:32 PM CDT)     SUMAtriptan (IMITREX) 50 MG tablet [[id number]]    Electronically signed by: Diana Coello CNP on 03/26/21 1231 Status: Active   Ordering user: Diana Coello CNP 03/26/21 1231 Authorized by: Diana Coello CNP   PRN reasons: migraine   Frequency: Q2H PRN 03/26/21 - Until Discontinued Released by: Diana Coello CNP 03/26/21 1231   Diagnoses  Chronic migraine without aura without status migrainosus, not intractable [G43.709]     Routing refill request to provider for review/approval because:  A break in medication  Patient needs to be seen because it has been more than 1 year since last office visit.  SA review    Last office visit provider:  7/9/21     Requested Prescriptions   Pending Prescriptions Disp Refills     SUMAtriptan (IMITREX) 50 MG tablet       Sig: Take 1 tablet (50 mg) by mouth as needed       Serotonin Agonists Failed - 10/11/2022  9:14 AM        Failed - Serotonin Agonist request needs review.     Please review patient's record. If patient has had 8 or more treatments in the past month, please forward to provider.          Failed - Recent (12 mo) or future (30 days) visit within the authorizing provider's specialty     Patient has had an office visit with the authorizing provider or a provider within the authorizing providers department within the previous 12 mos or has a future within next 30 days. See \"Patient Info\" tab in inbasket, or \"Choose Columns\" in Meds & Orders section of the refill encounter.              Passed - Blood pressure under 140/90 in past 12 months     BP Readings from Last 3 " Encounters:   07/22/22 98/66   01/31/22 110/78   07/26/21 130/80                 Passed - Medication is active on med list        Passed - Patient is age 18 or older        Passed - No active pregnancy on record        Passed - No positive pregnancy test in past 12 months             Douglas Rosado RN 10/11/22 9:14 AM

## 2022-10-11 NOTE — TELEPHONE ENCOUNTER
Patient calling to request refill of attached medication.  Stating it's been a few years since her last migraine headache.  She has recently had some fairly significant life events (hisband had a stroke, family member passed away) and the stress is causing her migraines to come back.

## 2022-10-12 RX ORDER — SUMATRIPTAN 50 MG/1
50 TABLET, FILM COATED ORAL PRN
Qty: 10 TABLET | Refills: 2 | Status: SHIPPED | OUTPATIENT
Start: 2022-10-12 | End: 2024-09-24

## 2022-11-04 ENCOUNTER — TRANSFERRED RECORDS (OUTPATIENT)
Dept: HEALTH INFORMATION MANAGEMENT | Facility: CLINIC | Age: 72
End: 2022-11-04

## 2022-12-13 ENCOUNTER — ANCILLARY PROCEDURE (OUTPATIENT)
Dept: CARDIOLOGY | Facility: CLINIC | Age: 72
End: 2022-12-13
Attending: INTERNAL MEDICINE
Payer: COMMERCIAL

## 2022-12-13 DIAGNOSIS — Z98.890 HISTORY OF LOOP RECORDER: ICD-10-CM

## 2022-12-13 DIAGNOSIS — I42.9 CARDIOMYOPATHY (H): ICD-10-CM

## 2022-12-13 LAB
MDC_IDC_EPISODE_DTM: NORMAL
MDC_IDC_EPISODE_DURATION: 12.94 S
MDC_IDC_EPISODE_DURATION: 3.01 S
MDC_IDC_EPISODE_DURATION: 3.05 S
MDC_IDC_EPISODE_DURATION: 3.07 S
MDC_IDC_EPISODE_DURATION: 3.15 S
MDC_IDC_EPISODE_DURATION: 3.35 S
MDC_IDC_EPISODE_DURATION: 3.56 S
MDC_IDC_EPISODE_DURATION: 3.63 S
MDC_IDC_EPISODE_DURATION: 3.63 S
MDC_IDC_EPISODE_DURATION: 3.69 S
MDC_IDC_EPISODE_DURATION: 3.71 S
MDC_IDC_EPISODE_DURATION: 3.74 S
MDC_IDC_EPISODE_DURATION: 36.8 S
MDC_IDC_EPISODE_DURATION: 4.02 S
MDC_IDC_EPISODE_DURATION: 4.1 S
MDC_IDC_EPISODE_DURATION: 4.13 S
MDC_IDC_EPISODE_DURATION: 4.45 S
MDC_IDC_EPISODE_DURATION: 4.51 S
MDC_IDC_EPISODE_DURATION: 4.87 S
MDC_IDC_EPISODE_DURATION: 4.98 S
MDC_IDC_EPISODE_DURATION: 5.67 S
MDC_IDC_EPISODE_DURATION: 5.7 S
MDC_IDC_EPISODE_DURATION: 5.91 S
MDC_IDC_EPISODE_DURATION: 5.95 S
MDC_IDC_EPISODE_DURATION: 6.41 S
MDC_IDC_EPISODE_DURATION: 6.62 S
MDC_IDC_EPISODE_DURATION: 7.29 S
MDC_IDC_EPISODE_DURATION: 7.36 S
MDC_IDC_EPISODE_DURATION: 7.61 S
MDC_IDC_EPISODE_DURATION: 8.92 S
MDC_IDC_EPISODE_ID: 326
MDC_IDC_EPISODE_ID: 327
MDC_IDC_EPISODE_ID: 328
MDC_IDC_EPISODE_ID: 329
MDC_IDC_EPISODE_ID: 330
MDC_IDC_EPISODE_ID: 331
MDC_IDC_EPISODE_ID: 332
MDC_IDC_EPISODE_ID: 333
MDC_IDC_EPISODE_ID: 334
MDC_IDC_EPISODE_ID: 335
MDC_IDC_EPISODE_ID: 336
MDC_IDC_EPISODE_ID: 337
MDC_IDC_EPISODE_ID: 338
MDC_IDC_EPISODE_ID: 339
MDC_IDC_EPISODE_ID: 340
MDC_IDC_EPISODE_ID: 341
MDC_IDC_EPISODE_ID: 342
MDC_IDC_EPISODE_ID: 343
MDC_IDC_EPISODE_ID: 344
MDC_IDC_EPISODE_ID: 345
MDC_IDC_EPISODE_ID: 346
MDC_IDC_EPISODE_ID: 347
MDC_IDC_EPISODE_ID: 348
MDC_IDC_EPISODE_ID: 349
MDC_IDC_EPISODE_ID: 350
MDC_IDC_EPISODE_ID: 351
MDC_IDC_EPISODE_ID: 352
MDC_IDC_EPISODE_ID: 353
MDC_IDC_EPISODE_ID: 354
MDC_IDC_EPISODE_ID: 355
MDC_IDC_EPISODE_TYPE: NORMAL
MDC_IDC_MSMT_BATTERY_STATUS: NORMAL
MDC_IDC_PG_IMPLANT_DTM: NORMAL
MDC_IDC_PG_MFG: NORMAL
MDC_IDC_PG_MODEL: NORMAL
MDC_IDC_PG_SERIAL: NORMAL
MDC_IDC_PG_TYPE: NORMAL
MDC_IDC_SESS_CLINIC_NAME: NORMAL
MDC_IDC_SESS_DTM: NORMAL
MDC_IDC_SESS_TYPE: NORMAL
MDC_IDC_SET_ZONE_DETECTION_INTERVAL: 2000 MS
MDC_IDC_SET_ZONE_DETECTION_INTERVAL: 3000 MS
MDC_IDC_SET_ZONE_DETECTION_INTERVAL: 370 MS
MDC_IDC_SET_ZONE_TYPE: NORMAL
MDC_IDC_STAT_AT_BURDEN_PERCENT: 0 %
MDC_IDC_STAT_AT_DTM_END: NORMAL
MDC_IDC_STAT_AT_DTM_START: NORMAL
MDC_IDC_STAT_EPISODE_RECENT_COUNT: 0
MDC_IDC_STAT_EPISODE_RECENT_COUNT: 40
MDC_IDC_STAT_EPISODE_RECENT_COUNT_DTM_END: NORMAL
MDC_IDC_STAT_EPISODE_RECENT_COUNT_DTM_START: NORMAL
MDC_IDC_STAT_EPISODE_TOTAL_COUNT: 0
MDC_IDC_STAT_EPISODE_TOTAL_COUNT: 1
MDC_IDC_STAT_EPISODE_TOTAL_COUNT: 321
MDC_IDC_STAT_EPISODE_TOTAL_COUNT: 33
MDC_IDC_STAT_EPISODE_TOTAL_COUNT_DTM_END: NORMAL
MDC_IDC_STAT_EPISODE_TOTAL_COUNT_DTM_START: NORMAL
MDC_IDC_STAT_EPISODE_TYPE: NORMAL

## 2022-12-13 PROCEDURE — 93297 REM INTERROG DEV EVAL ICPMS: CPT | Performed by: INTERNAL MEDICINE

## 2022-12-13 PROCEDURE — G2066 INTER DEVC REMOTE 30D: HCPCS | Performed by: INTERNAL MEDICINE

## 2022-12-19 ENCOUNTER — TELEPHONE (OUTPATIENT)
Dept: CARDIOLOGY | Facility: CLINIC | Age: 72
End: 2022-12-19

## 2022-12-19 NOTE — TELEPHONE ENCOUNTER
Pt called in to confirm letter received for missed appt. Confirmed device check completed on 12/13. No further action required. -JOLEEN

## 2022-12-22 ENCOUNTER — TRANSFERRED RECORDS (OUTPATIENT)
Dept: HEALTH INFORMATION MANAGEMENT | Facility: CLINIC | Age: 72
End: 2022-12-22

## 2022-12-27 ENCOUNTER — TELEPHONE (OUTPATIENT)
Dept: CARDIOLOGY | Facility: CLINIC | Age: 72
End: 2022-12-27

## 2022-12-27 NOTE — TELEPHONE ENCOUNTER
Pt called in to discuss upcoming colonoscopy in January, did not require cardiac clearance per pt. Pt had questions surrounding general anesthesia. Discussed generally colonoscopies are a sedation procedure and treated as outpatient. Advised pt to further discuss with [facility] and if cardiac clearance is needed an appt can be made. Pt expressed understanding.. -JOLEEN

## 2023-01-12 ENCOUNTER — OFFICE VISIT (OUTPATIENT)
Dept: INTERNAL MEDICINE | Facility: CLINIC | Age: 73
End: 2023-01-12
Payer: COMMERCIAL

## 2023-01-12 VITALS
HEART RATE: 63 BPM | OXYGEN SATURATION: 95 % | SYSTOLIC BLOOD PRESSURE: 130 MMHG | WEIGHT: 197 LBS | HEIGHT: 63 IN | DIASTOLIC BLOOD PRESSURE: 82 MMHG | BODY MASS INDEX: 34.91 KG/M2 | TEMPERATURE: 98 F

## 2023-01-12 DIAGNOSIS — G54.5 PARSONAGE-TURNER SYNDROME: ICD-10-CM

## 2023-01-12 DIAGNOSIS — Z86.0100 HISTORY OF COLONIC POLYPS: ICD-10-CM

## 2023-01-12 DIAGNOSIS — I48.0 PAROXYSMAL ATRIAL FIBRILLATION (H): ICD-10-CM

## 2023-01-12 DIAGNOSIS — Z01.818 PREOPERATIVE EXAMINATION: Primary | ICD-10-CM

## 2023-01-12 DIAGNOSIS — I42.2 HYPERTROPHIC CARDIOMYOPATHY (H): ICD-10-CM

## 2023-01-12 DIAGNOSIS — Z13.220 SCREENING FOR HYPERLIPIDEMIA: ICD-10-CM

## 2023-01-12 DIAGNOSIS — N18.2 CKD (CHRONIC KIDNEY DISEASE) STAGE 2, GFR 60-89 ML/MIN: ICD-10-CM

## 2023-01-12 DIAGNOSIS — I10 ESSENTIAL HYPERTENSION: ICD-10-CM

## 2023-01-12 DIAGNOSIS — I50.32 CHRONIC DIASTOLIC CONGESTIVE HEART FAILURE (H): ICD-10-CM

## 2023-01-12 DIAGNOSIS — G43.709 CHRONIC MIGRAINE WITHOUT AURA WITHOUT STATUS MIGRAINOSUS, NOT INTRACTABLE: ICD-10-CM

## 2023-01-12 LAB
ALBUMIN SERPL BCG-MCNC: 4.1 G/DL (ref 3.5–5.2)
ALP SERPL-CCNC: 65 U/L (ref 35–104)
ALT SERPL W P-5'-P-CCNC: 18 U/L (ref 10–35)
ANION GAP SERPL CALCULATED.3IONS-SCNC: 14 MMOL/L (ref 7–15)
AST SERPL W P-5'-P-CCNC: 29 U/L (ref 10–35)
ATRIAL RATE - MUSE: 60 BPM
BILIRUB SERPL-MCNC: 0.5 MG/DL
BUN SERPL-MCNC: 17.9 MG/DL (ref 8–23)
CALCIUM SERPL-MCNC: 9.5 MG/DL (ref 8.8–10.2)
CHLORIDE SERPL-SCNC: 106 MMOL/L (ref 98–107)
CHOLEST SERPL-MCNC: 210 MG/DL
CREAT SERPL-MCNC: 0.79 MG/DL (ref 0.51–0.95)
DEPRECATED HCO3 PLAS-SCNC: 26 MMOL/L (ref 22–29)
DIASTOLIC BLOOD PRESSURE - MUSE: NORMAL MMHG
ERYTHROCYTE [DISTWIDTH] IN BLOOD BY AUTOMATED COUNT: 13.3 % (ref 10–15)
GFR SERPL CREATININE-BSD FRML MDRD: 80 ML/MIN/1.73M2
GLUCOSE SERPL-MCNC: 100 MG/DL (ref 70–99)
HCT VFR BLD AUTO: 45.2 % (ref 35–47)
HDLC SERPL-MCNC: 64 MG/DL
HGB BLD-MCNC: 14.8 G/DL (ref 11.7–15.7)
INTERPRETATION ECG - MUSE: NORMAL
LDLC SERPL CALC-MCNC: 125 MG/DL
MCH RBC QN AUTO: 28.5 PG (ref 26.5–33)
MCHC RBC AUTO-ENTMCNC: 32.7 G/DL (ref 31.5–36.5)
MCV RBC AUTO: 87 FL (ref 78–100)
NONHDLC SERPL-MCNC: 146 MG/DL
P AXIS - MUSE: 21 DEGREES
PLATELET # BLD AUTO: 185 10E3/UL (ref 150–450)
POTASSIUM SERPL-SCNC: 4 MMOL/L (ref 3.4–5.3)
PR INTERVAL - MUSE: 248 MS
PROT SERPL-MCNC: 6.7 G/DL (ref 6.4–8.3)
QRS DURATION - MUSE: 170 MS
QT - MUSE: 508 MS
QTC - MUSE: 508 MS
R AXIS - MUSE: -56 DEGREES
RBC # BLD AUTO: 5.19 10E6/UL (ref 3.8–5.2)
SODIUM SERPL-SCNC: 146 MMOL/L (ref 136–145)
SYSTOLIC BLOOD PRESSURE - MUSE: NORMAL MMHG
T AXIS - MUSE: 136 DEGREES
TRIGL SERPL-MCNC: 106 MG/DL
VENTRICULAR RATE- MUSE: 60 BPM
WBC # BLD AUTO: 8.8 10E3/UL (ref 4–11)

## 2023-01-12 PROCEDURE — 80061 LIPID PANEL: CPT | Performed by: NURSE PRACTITIONER

## 2023-01-12 PROCEDURE — G0009 ADMIN PNEUMOCOCCAL VACCINE: HCPCS | Performed by: NURSE PRACTITIONER

## 2023-01-12 PROCEDURE — 36415 COLL VENOUS BLD VENIPUNCTURE: CPT | Performed by: NURSE PRACTITIONER

## 2023-01-12 PROCEDURE — 80053 COMPREHEN METABOLIC PANEL: CPT | Performed by: NURSE PRACTITIONER

## 2023-01-12 PROCEDURE — 90677 PCV20 VACCINE IM: CPT | Performed by: NURSE PRACTITIONER

## 2023-01-12 PROCEDURE — 93010 ELECTROCARDIOGRAM REPORT: CPT | Performed by: INTERNAL MEDICINE

## 2023-01-12 PROCEDURE — 99214 OFFICE O/P EST MOD 30 MIN: CPT | Mod: 25 | Performed by: NURSE PRACTITIONER

## 2023-01-12 PROCEDURE — 85027 COMPLETE CBC AUTOMATED: CPT | Performed by: NURSE PRACTITIONER

## 2023-01-12 PROCEDURE — 93005 ELECTROCARDIOGRAM TRACING: CPT | Performed by: NURSE PRACTITIONER

## 2023-01-12 ASSESSMENT — PAIN SCALES - GENERAL: PAINLEVEL: NO PAIN (0)

## 2023-01-12 NOTE — PROGRESS NOTES
[address] [address]  Phone: [phone]  Fax: [phone]  Primary Provider: Ivelisse Coello  Pre-op Performing Provider: IVELISSE COELLO      PREOPERATIVE EVALUATION:  Today's date: 1/12/2023      **Pt has a brown spot on her right shoulder and another on her left bicep that she would like looked at to rule out skin cancer.       Romy Izquierdo is a 72 year old female who presents for a preoperative evaluation.    Surgical Information:  Surgery/Procedure: Colonoscopy  Surgery Location: [facility]  Surgeon: Kwadwo Kline MD  Surgery Date: 1/26/23  Time of Surgery: 8:20 am  Where patient plans to recover: At home with family  Fax number for surgical facility: Note does not need to be faxed, will be available electronically in Epic.    Type of Anesthesia Anticipated: Local with MAC    Assessment & Plan     The proposed surgical procedure is considered LOW risk.    Problem List Items Addressed This Visit        Nervous and Auditory    Migraine Headache    Parsonage-Mcknight syndrome       Circulatory    Essential Hypertension    Hypertrophic cardiomyopathy (H)    Paroxysmal atrial fibrillation (H)   Other Visit Diagnoses     Preoperative examination    -  Primary    Relevant Orders    EKG 12-lead, tracing only (Completed)    Comprehensive metabolic panel    Screening for hyperlipidemia        Relevant Orders    Lipid panel reflex to direct LDL Non-fasting    Chronic diastolic congestive heart failure (H)        Relevant Orders    Lipid panel reflex to direct LDL Non-fasting    CBC with Platelets    CKD (chronic kidney disease) stage 2, GFR 60-89 ml/min        History of colonic polyps                 Implanted Device:        Risks and Recommendations:  The patient has the following additional risks and recommendations for perioperative complications:   - No identified additional risk factors other than previously  addressed    Medication Instructions:   - Beta Blockers: Continue taking on the day of surgery.    RECOMMENDATION:  APPROVAL GIVEN to proceed with proposed procedure, without further diagnostic evaluation.            Subjective     HPI related to upcoming procedure: Patient presents clinic for preop examination for colonoscopy she has history of colon polyps      Preop Questions 1/12/2023   1. Have you ever had a heart attack or stroke? No   2. Have you ever had surgery on your heart or blood vessels, such as a stent placement, a coronary artery bypass, or surgery on an artery in your head, neck, heart, or legs? YES -    3. Do you have chest pain with activity? No   4. Do you have a history of  heart failure? YES -    5. Do you currently have a cold, bronchitis or symptoms of other infection? No   6. Do you have a cough, shortness of breath, or wheezing? YES -    7. Do you or anyone in your family have previous history of blood clots? No   8. Do you or does anyone in your family have a serious bleeding problem such as prolonged bleeding following surgeries or cuts? No   9. Have you ever had problems with anemia or been told to take iron pills? No   10. Have you had any abnormal blood loss such as black, tarry or bloody stools, or abnormal vaginal bleeding? YES -    11. Have you ever had a blood transfusion? YES -    11a. Have you ever had a transfusion reaction? No   12. Are you willing to have a blood transfusion if it is medically needed before, during, or after your surgery? Yes   13. Have you or any of your relatives ever had problems with anesthesia? UNKNOWN -    14. Do you have sleep apnea, excessive snoring or daytime drowsiness? No   15. Do you have any artifical heart valves or other implanted medical devices like a pacemaker, defibrillator, or continuous glucose monitor? YES -    15a. What type of device do you have? loop recorder   15b. Name of the clinic that manages your device:  [address]  [place]   16. Do you have artificial joints? No   17. Are you allergic to latex? YES:      Health Care Directive:  Patient does not have a Health Care Directive or Living Will: Discussed advance care planning with patient; information given to patient to review.    Preoperative Review of :   reviewed - no record of controlled substances prescribed.      Status of Chronic Conditions:  See problem list for active medical problems.  Problems all longstanding and stable, except as noted/documented.  See ROS for pertinent symptoms related to these conditions.      Review of Systems  Unremarkable other than listed above and below       Patient Active Problem List    Diagnosis Date Noted     A-fib (H) 01/21/2021     Priority: Medium     Added automatically from request for surgery [id number]         Hypertrophic cardiomyopathy (H) 01/15/2021     Priority: Medium     Severe outflow tract obstruction  Preserved LV systolic performance  Basal septum: 20 mm  Gadolinium enhancement positive: Basal septum  No major positive and no other minor positive risk factors identified.         Obesity (BMI 35.0-39.9) with comorbidity (H) 01/15/2021     Priority: Medium     Paroxysmal atrial fibrillation (H) 01/15/2021     Priority: Medium     Dx Nov 2020 (NAIF)  [id number] score = 3  Rx apixaban         Parsonage-Mcknight syndrome      Priority: Medium     Created by Conversion         Migraine Headache      Priority: Medium     Created by Conversion  Replacement Utility updated for latest IMO load         Essential Hypertension      Priority: Medium     Created by Conversion  Replacement Utility updated for latest IMO load          Past Medical History:   Diagnosis Date     Hypertrophic cardiomyopathy (H) 1/15/2021    Severe outflow tract obstruction Preserved LV systolic performance Basal septum: 20 mm Gadolinium enhancement positive: Basal septum No major positive and no other minor positive risk factors identified.     Paroxysmal  atrial fibrillation (H) 1/15/2021    Dx Nov 2020 (NAIF) [id number] score = 3 Rx apixaban     Parsonage-Mcknight syndrome      Past Surgical History:   Procedure Laterality Date     EP LOOP RECORDER IMPLANT N/A 1/29/2021    Procedure: EP Loop Recorder Insertion;  Surgeon: Paul Garza MD;  Location: [facility] Cardiac Cath Lab;  Service: Cardiology     EXCISE BREAST CYST/FIBROADENOMA/TUMOR/DUCT LESION/NIPPLE LESION/AREOLAR LESION      Description: Breast Surgery Lumpectomy;  Recorded: 03/07/2012;  Comments: adenoma     HC EXCISION NASAL POLYP(S), EXTENSIVE      Description: Extensive Excision Of Nasal Polyps;  Recorded: 03/07/2012;     HC KNEE SCOPE, DIAGNOSTIC      Description: Arthroscopy Knee Left;  Recorded: 03/07/2012;     NH EXCIS TENDON SHEATH LESION, HAND/FINGER      Description: Hand Excision Of A Tendon Cyst;  Recorded: 03/07/2012;     US GUIDED NEEDLE PLACEMENT  7/20/2020     ZZC REMOVAL OF OVARY(S)      Description: Oophorectomy;  Recorded: 06/26/2013;     Current Outpatient Medications   Medication Sig Dispense Refill     aspirin (ASA) 81 MG EC tablet Take 1 tablet (81 mg) by mouth daily 90 tablet 3     Biotin 10 MG CAPS Take 1 capsule by mouth daily       Cetaphil Moisturizing (CETAPHIL) external lotion Apply 1 Application topically daily       cycloSPORINE (RESTASIS) 0.05 % ophthalmic emulsion Place 1 drop into both eyes       metoprolol succinate ER (TOPROL XL) 50 MG 24 hr tablet TAKE 1 TABLET(50 MG) BY MOUTH TWICE DAILY 180 tablet 3     multivitamin (CENTRUM SILVER) tablet Take 2 tablets by mouth daily       Omega-3 Fatty Acids (RA FISH OIL) 1000 MG CAPS 2 capsules in the morning and 1 capsule at night       sodium chloride (OCEAN) 0.65 % nasal spray 1 spray daily as needed       SUMAtriptan (IMITREX) 50 MG tablet Take 1 tablet (50 mg) by mouth as needed for migraine 10 tablet 2     Turmeric Curcumin 500 MG CAPS Take 1 tablet by mouth daily       calcium carbonate-vitamin D (OSCAL W/D) 500-200  MG-UNIT tablet Take 2 tablets by mouth (Patient not taking: Reported on 7/22/2022)       furosemide (LASIX) 40 MG tablet Take 1 tablet (40 mg) by mouth daily (Patient not taking: Reported on 1/12/2023) 90 tablet 3     potassium chloride ER (KLOR-CON M) 20 MEQ CR tablet TAKE 1 TABLET(20 MEQ) BY MOUTH DAILY (Patient not taking: Reported on 1/12/2023) 90 tablet 3       Allergies   Allergen Reactions     Shellfish Allergy Shortness Of Breath     Midazolam Hives     Welts     Acetaminophen      Dust Mites Other (See Comments)     Stuffy nose     Molds & Smuts      Other reaction(s): Wheezing  [place] blue grass,dust     Adhesive Tape Rash        Social History     Tobacco Use     Smoking status: Never     Smokeless tobacco: Never   Substance Use Topics     Alcohol use: Never     Family History   Problem Relation Age of Onset     Pacemaker Father      Coronary Artery Disease Father 65.00     Colon Cancer Father         XRT and chemo     Heart Failure Father 79.00     Cerebrovascular Disease Mother 89.00     Hypertension Mother      Endometrial Cancer Mother      Other - See Comments Mother         Scarlet fever vs rheumatic fever     Other - See Comments Cousin 16.00        Drown, Hypertrophic (apparently via father - not realted)     Cardiomyopathy Cousin         Hypertrophic (apparently via father - not realted)     Cardiomyopathy Child         Hypertrophic (apparently via father - not realted)     Sudden Death No family hx of      History   Drug Use Unknown         Objective GENERAL APPEARANCE: healthy, alert and no distress  HENT: ear canals and TM's normal and nose and mouth without ulcers or lesions  RESP: lungs clear to auscultation - no rales, rhonchi or wheezes  CV: regular rate and rhythm, normal S1 S2, no S3 or S4 and no murmur, click or rub   ABDOMEN: soft, nontender, no HSM or masses and bowel sounds normal  NEURO: Normal strength and tone, sensory exam grossly normal, mentation intact and speech normal  BP  "(!) 146/89 (BP Location: Right arm, Patient Position: Sitting, Cuff Size: Adult Regular)   Pulse 63   Temp 98  F (36.7  C) (Oral)   Ht 1.6 m (5' 3\")   Wt 89.4 kg (197 lb)   SpO2 95%   BMI 34.90 kg/m      Physical Exam  GENERAL APPEARANCE: healthy, alert and no distress  HENT: ear canals and TM's normal and nose and mouth without ulcers or lesions  RESP: lungs clear to auscultation - no rales, rhonchi or wheezes  CV: regular rate and rhythm, normal S1 S2, no S3 or S4 and no murmur, click or rub   ABDOMEN: soft, nontender, no HSM or masses and bowel sounds normal  NEURO: Normal strength and tone, sensory exam grossly normal, mentation intact and speech normal    Recent Labs   Lab Test 09/02/22  1452 07/23/21  1009 06/30/21  1201 06/10/21  1533   HGB  --  12.4  --  9.5*   PLT  --  214  --  277   NA  --  145 143 147*   POTASSIUM  --  3.4* 3.5 3.8   CR 0.9 0.87 0.79 1.23*        Diagnostics:  Labs pending at this time.  Results will be reviewed when available.  Recent Results (from the past 24 hour(s))   EKG 12-lead, tracing only    Collection Time: 01/12/23 10:51 AM   Result Value Ref Range    Systolic Blood Pressure  mmHg    Diastolic Blood Pressure  mmHg    Ventricular Rate 60 BPM    Atrial Rate 60 BPM    AZ Interval 248 ms    QRS Duration 170 ms     ms    QTc 508 ms    P Axis 21 degrees    R AXIS -56 degrees    T Axis 136 degrees    Interpretation ECG       Sinus rhythm with 1st degree A-V block  Possible Left atrial enlargement  Left axis deviation  Left bundle branch block  Abnormal ECG  When compared with ECG of 23-JUL-2021 10:03,  Fusion complexes are no longer Present  Nonspecific T wave abnormality no longer evident in Inferior leads            Revised Cardiac Risk Index (RCRI):  The patient has the following serious cardiovascular risks for perioperative complications:   - No serious cardiac risks = 0 points     RCRI Interpretation: 0 points: Class I (very low risk - 0.4% complication rate)       "     Signed Electronically by: Diana Coello NP  Copy of this evaluation report is provided to requesting physician.

## 2023-01-12 NOTE — H&P (VIEW-ONLY)
[address] [address]  Phone: [phone]  Fax: [phone]  Primary Provider: Ivelisse Coello  Pre-op Performing Provider: IVELISSE COELLO      PREOPERATIVE EVALUATION:  Today's date: 1/12/2023      **Pt has a brown spot on her right shoulder and another on her left bicep that she would like looked at to rule out skin cancer.       Romy Izquierdo is a 72 year old female who presents for a preoperative evaluation.    Surgical Information:  Surgery/Procedure: Colonoscopy  Surgery Location: [facility]  Surgeon: Kwadwo Kline MD  Surgery Date: 1/26/23  Time of Surgery: 8:20 am  Where patient plans to recover: At home with family  Fax number for surgical facility: Note does not need to be faxed, will be available electronically in Epic.    Type of Anesthesia Anticipated: Local with MAC    Assessment & Plan     The proposed surgical procedure is considered LOW risk.    Problem List Items Addressed This Visit        Nervous and Auditory    Migraine Headache    Parsonage-Mcknight syndrome       Circulatory    Essential Hypertension    Hypertrophic cardiomyopathy (H)    Paroxysmal atrial fibrillation (H)   Other Visit Diagnoses     Preoperative examination    -  Primary    Relevant Orders    EKG 12-lead, tracing only (Completed)    Comprehensive metabolic panel    Screening for hyperlipidemia        Relevant Orders    Lipid panel reflex to direct LDL Non-fasting    Chronic diastolic congestive heart failure (H)        Relevant Orders    Lipid panel reflex to direct LDL Non-fasting    CBC with Platelets    CKD (chronic kidney disease) stage 2, GFR 60-89 ml/min        History of colonic polyps                 Implanted Device:        Risks and Recommendations:  The patient has the following additional risks and recommendations for perioperative complications:   - No identified additional risk factors other than previously  addressed    Medication Instructions:   - Beta Blockers: Continue taking on the day of surgery.    RECOMMENDATION:  APPROVAL GIVEN to proceed with proposed procedure, without further diagnostic evaluation.            Subjective     HPI related to upcoming procedure: Patient presents clinic for preop examination for colonoscopy she has history of colon polyps      Preop Questions 1/12/2023   1. Have you ever had a heart attack or stroke? No   2. Have you ever had surgery on your heart or blood vessels, such as a stent placement, a coronary artery bypass, or surgery on an artery in your head, neck, heart, or legs? YES -    3. Do you have chest pain with activity? No   4. Do you have a history of  heart failure? YES -    5. Do you currently have a cold, bronchitis or symptoms of other infection? No   6. Do you have a cough, shortness of breath, or wheezing? YES -    7. Do you or anyone in your family have previous history of blood clots? No   8. Do you or does anyone in your family have a serious bleeding problem such as prolonged bleeding following surgeries or cuts? No   9. Have you ever had problems with anemia or been told to take iron pills? No   10. Have you had any abnormal blood loss such as black, tarry or bloody stools, or abnormal vaginal bleeding? YES -    11. Have you ever had a blood transfusion? YES -    11a. Have you ever had a transfusion reaction? No   12. Are you willing to have a blood transfusion if it is medically needed before, during, or after your surgery? Yes   13. Have you or any of your relatives ever had problems with anesthesia? UNKNOWN -    14. Do you have sleep apnea, excessive snoring or daytime drowsiness? No   15. Do you have any artifical heart valves or other implanted medical devices like a pacemaker, defibrillator, or continuous glucose monitor? YES -    15a. What type of device do you have? loop recorder   15b. Name of the clinic that manages your device:  [address]  [place]   16. Do you have artificial joints? No   17. Are you allergic to latex? YES:      Health Care Directive:  Patient does not have a Health Care Directive or Living Will: Discussed advance care planning with patient; information given to patient to review.    Preoperative Review of :   reviewed - no record of controlled substances prescribed.      Status of Chronic Conditions:  See problem list for active medical problems.  Problems all longstanding and stable, except as noted/documented.  See ROS for pertinent symptoms related to these conditions.      Review of Systems  Unremarkable other than listed above and below       Patient Active Problem List    Diagnosis Date Noted     A-fib (H) 01/21/2021     Priority: Medium     Added automatically from request for surgery [id number]         Hypertrophic cardiomyopathy (H) 01/15/2021     Priority: Medium     Severe outflow tract obstruction  Preserved LV systolic performance  Basal septum: 20 mm  Gadolinium enhancement positive: Basal septum  No major positive and no other minor positive risk factors identified.         Obesity (BMI 35.0-39.9) with comorbidity (H) 01/15/2021     Priority: Medium     Paroxysmal atrial fibrillation (H) 01/15/2021     Priority: Medium     Dx Nov 2020 (NAIF)  [id number] score = 3  Rx apixaban         Parsonage-Mcknight syndrome      Priority: Medium     Created by Conversion         Migraine Headache      Priority: Medium     Created by Conversion  Replacement Utility updated for latest IMO load         Essential Hypertension      Priority: Medium     Created by Conversion  Replacement Utility updated for latest IMO load          Past Medical History:   Diagnosis Date     Hypertrophic cardiomyopathy (H) 1/15/2021    Severe outflow tract obstruction Preserved LV systolic performance Basal septum: 20 mm Gadolinium enhancement positive: Basal septum No major positive and no other minor positive risk factors identified.     Paroxysmal  atrial fibrillation (H) 1/15/2021    Dx Nov 2020 (NAIF) [id number] score = 3 Rx apixaban     Parsonage-Mcknight syndrome      Past Surgical History:   Procedure Laterality Date     EP LOOP RECORDER IMPLANT N/A 1/29/2021    Procedure: EP Loop Recorder Insertion;  Surgeon: Paul Garza MD;  Location: [facility] Cardiac Cath Lab;  Service: Cardiology     EXCISE BREAST CYST/FIBROADENOMA/TUMOR/DUCT LESION/NIPPLE LESION/AREOLAR LESION      Description: Breast Surgery Lumpectomy;  Recorded: 03/07/2012;  Comments: adenoma     HC EXCISION NASAL POLYP(S), EXTENSIVE      Description: Extensive Excision Of Nasal Polyps;  Recorded: 03/07/2012;     HC KNEE SCOPE, DIAGNOSTIC      Description: Arthroscopy Knee Left;  Recorded: 03/07/2012;     ND EXCIS TENDON SHEATH LESION, HAND/FINGER      Description: Hand Excision Of A Tendon Cyst;  Recorded: 03/07/2012;     US GUIDED NEEDLE PLACEMENT  7/20/2020     ZZC REMOVAL OF OVARY(S)      Description: Oophorectomy;  Recorded: 06/26/2013;     Current Outpatient Medications   Medication Sig Dispense Refill     aspirin (ASA) 81 MG EC tablet Take 1 tablet (81 mg) by mouth daily 90 tablet 3     Biotin 10 MG CAPS Take 1 capsule by mouth daily       Cetaphil Moisturizing (CETAPHIL) external lotion Apply 1 Application topically daily       cycloSPORINE (RESTASIS) 0.05 % ophthalmic emulsion Place 1 drop into both eyes       metoprolol succinate ER (TOPROL XL) 50 MG 24 hr tablet TAKE 1 TABLET(50 MG) BY MOUTH TWICE DAILY 180 tablet 3     multivitamin (CENTRUM SILVER) tablet Take 2 tablets by mouth daily       Omega-3 Fatty Acids (RA FISH OIL) 1000 MG CAPS 2 capsules in the morning and 1 capsule at night       sodium chloride (OCEAN) 0.65 % nasal spray 1 spray daily as needed       SUMAtriptan (IMITREX) 50 MG tablet Take 1 tablet (50 mg) by mouth as needed for migraine 10 tablet 2     Turmeric Curcumin 500 MG CAPS Take 1 tablet by mouth daily       calcium carbonate-vitamin D (OSCAL W/D) 500-200  MG-UNIT tablet Take 2 tablets by mouth (Patient not taking: Reported on 7/22/2022)       furosemide (LASIX) 40 MG tablet Take 1 tablet (40 mg) by mouth daily (Patient not taking: Reported on 1/12/2023) 90 tablet 3     potassium chloride ER (KLOR-CON M) 20 MEQ CR tablet TAKE 1 TABLET(20 MEQ) BY MOUTH DAILY (Patient not taking: Reported on 1/12/2023) 90 tablet 3       Allergies   Allergen Reactions     Shellfish Allergy Shortness Of Breath     Midazolam Hives     Welts     Acetaminophen      Dust Mites Other (See Comments)     Stuffy nose     Molds & Smuts      Other reaction(s): Wheezing  [place] blue grass,dust     Adhesive Tape Rash        Social History     Tobacco Use     Smoking status: Never     Smokeless tobacco: Never   Substance Use Topics     Alcohol use: Never     Family History   Problem Relation Age of Onset     Pacemaker Father      Coronary Artery Disease Father 65.00     Colon Cancer Father         XRT and chemo     Heart Failure Father 79.00     Cerebrovascular Disease Mother 89.00     Hypertension Mother      Endometrial Cancer Mother      Other - See Comments Mother         Scarlet fever vs rheumatic fever     Other - See Comments Cousin 16.00        Drown, Hypertrophic (apparently via father - not realted)     Cardiomyopathy Cousin         Hypertrophic (apparently via father - not realted)     Cardiomyopathy Child         Hypertrophic (apparently via father - not realted)     Sudden Death No family hx of      History   Drug Use Unknown         Objective GENERAL APPEARANCE: healthy, alert and no distress  HENT: ear canals and TM's normal and nose and mouth without ulcers or lesions  RESP: lungs clear to auscultation - no rales, rhonchi or wheezes  CV: regular rate and rhythm, normal S1 S2, no S3 or S4 and no murmur, click or rub   ABDOMEN: soft, nontender, no HSM or masses and bowel sounds normal  NEURO: Normal strength and tone, sensory exam grossly normal, mentation intact and speech normal  BP  "(!) 146/89 (BP Location: Right arm, Patient Position: Sitting, Cuff Size: Adult Regular)   Pulse 63   Temp 98  F (36.7  C) (Oral)   Ht 1.6 m (5' 3\")   Wt 89.4 kg (197 lb)   SpO2 95%   BMI 34.90 kg/m      Physical Exam  GENERAL APPEARANCE: healthy, alert and no distress  HENT: ear canals and TM's normal and nose and mouth without ulcers or lesions  RESP: lungs clear to auscultation - no rales, rhonchi or wheezes  CV: regular rate and rhythm, normal S1 S2, no S3 or S4 and no murmur, click or rub   ABDOMEN: soft, nontender, no HSM or masses and bowel sounds normal  NEURO: Normal strength and tone, sensory exam grossly normal, mentation intact and speech normal    Recent Labs   Lab Test 09/02/22  1452 07/23/21  1009 06/30/21  1201 06/10/21  1533   HGB  --  12.4  --  9.5*   PLT  --  214  --  277   NA  --  145 143 147*   POTASSIUM  --  3.4* 3.5 3.8   CR 0.9 0.87 0.79 1.23*        Diagnostics:  Labs pending at this time.  Results will be reviewed when available.  Recent Results (from the past 24 hour(s))   EKG 12-lead, tracing only    Collection Time: 01/12/23 10:51 AM   Result Value Ref Range    Systolic Blood Pressure  mmHg    Diastolic Blood Pressure  mmHg    Ventricular Rate 60 BPM    Atrial Rate 60 BPM    MT Interval 248 ms    QRS Duration 170 ms     ms    QTc 508 ms    P Axis 21 degrees    R AXIS -56 degrees    T Axis 136 degrees    Interpretation ECG       Sinus rhythm with 1st degree A-V block  Possible Left atrial enlargement  Left axis deviation  Left bundle branch block  Abnormal ECG  When compared with ECG of 23-JUL-2021 10:03,  Fusion complexes are no longer Present  Nonspecific T wave abnormality no longer evident in Inferior leads            Revised Cardiac Risk Index (RCRI):  The patient has the following serious cardiovascular risks for perioperative complications:   - No serious cardiac risks = 0 points     RCRI Interpretation: 0 points: Class I (very low risk - 0.4% complication rate)       "     Signed Electronically by: Diana Coello NP  Copy of this evaluation report is provided to requesting physician.

## 2023-01-14 ENCOUNTER — HEALTH MAINTENANCE LETTER (OUTPATIENT)
Age: 73
End: 2023-01-14

## 2023-01-25 ENCOUNTER — ANESTHESIA EVENT (OUTPATIENT)
Dept: SURGERY | Facility: CLINIC | Age: 73
End: 2023-01-25
Payer: COMMERCIAL

## 2023-01-26 ENCOUNTER — HOSPITAL ENCOUNTER (OUTPATIENT)
Facility: CLINIC | Age: 73
Discharge: HOME | End: 2023-01-26
Attending: INTERNAL MEDICINE | Admitting: INTERNAL MEDICINE
Payer: COMMERCIAL

## 2023-01-26 ENCOUNTER — ANESTHESIA (OUTPATIENT)
Dept: SURGERY | Facility: CLINIC | Age: 73
End: 2023-01-26
Payer: COMMERCIAL

## 2023-01-26 VITALS
SYSTOLIC BLOOD PRESSURE: 121 MMHG | OXYGEN SATURATION: 96 % | DIASTOLIC BLOOD PRESSURE: 70 MMHG | RESPIRATION RATE: 16 BRPM | TEMPERATURE: 96.9 F

## 2023-01-26 LAB — COLONOSCOPY: NORMAL

## 2023-01-26 PROCEDURE — 250N000009 HC RX 250: Performed by: NURSE ANESTHETIST, CERTIFIED REGISTERED

## 2023-01-26 PROCEDURE — 370N000017 HC ANESTHESIA TECHNICAL FEE, PER MIN: Performed by: INTERNAL MEDICINE

## 2023-01-26 PROCEDURE — 272N000001 HC OR GENERAL SUPPLY STERILE: Performed by: INTERNAL MEDICINE

## 2023-01-26 PROCEDURE — 999N000141 HC STATISTIC PRE-PROCEDURE NURSING ASSESSMENT: Performed by: INTERNAL MEDICINE

## 2023-01-26 PROCEDURE — 258N000003 HC RX IP 258 OP 636: Performed by: ANESTHESIOLOGY

## 2023-01-26 PROCEDURE — 710N000012 HC RECOVERY PHASE 2, PER MINUTE: Performed by: INTERNAL MEDICINE

## 2023-01-26 PROCEDURE — 360N000075 HC SURGERY LEVEL 2, PER MIN: Performed by: INTERNAL MEDICINE

## 2023-01-26 PROCEDURE — 88305 TISSUE EXAM BY PATHOLOGIST: CPT | Mod: TC | Performed by: INTERNAL MEDICINE

## 2023-01-26 PROCEDURE — 250N000011 HC RX IP 250 OP 636: Performed by: NURSE ANESTHETIST, CERTIFIED REGISTERED

## 2023-01-26 RX ORDER — NALOXONE HYDROCHLORIDE 0.4 MG/ML
0.2 INJECTION, SOLUTION INTRAMUSCULAR; INTRAVENOUS; SUBCUTANEOUS
Status: DISCONTINUED | OUTPATIENT
Start: 2023-01-26 | End: 2023-01-26 | Stop reason: HOSPADM

## 2023-01-26 RX ORDER — PROCHLORPERAZINE MALEATE 5 MG
5 TABLET ORAL EVERY 6 HOURS PRN
Status: DISCONTINUED | OUTPATIENT
Start: 2023-01-26 | End: 2023-01-26 | Stop reason: HOSPADM

## 2023-01-26 RX ORDER — ONDANSETRON 2 MG/ML
4 INJECTION INTRAMUSCULAR; INTRAVENOUS
Status: DISCONTINUED | OUTPATIENT
Start: 2023-01-26 | End: 2023-01-26 | Stop reason: HOSPADM

## 2023-01-26 RX ORDER — LIDOCAINE HYDROCHLORIDE 10 MG/ML
INJECTION, SOLUTION INFILTRATION; PERINEURAL PRN
Status: DISCONTINUED | OUTPATIENT
Start: 2023-01-26 | End: 2023-01-26

## 2023-01-26 RX ORDER — PROPOFOL 10 MG/ML
INJECTION, EMULSION INTRAVENOUS CONTINUOUS PRN
Status: DISCONTINUED | OUTPATIENT
Start: 2023-01-26 | End: 2023-01-26

## 2023-01-26 RX ORDER — ONDANSETRON 2 MG/ML
4 INJECTION INTRAMUSCULAR; INTRAVENOUS EVERY 6 HOURS PRN
Status: DISCONTINUED | OUTPATIENT
Start: 2023-01-26 | End: 2023-01-26 | Stop reason: HOSPADM

## 2023-01-26 RX ORDER — SODIUM CHLORIDE, SODIUM LACTATE, POTASSIUM CHLORIDE, CALCIUM CHLORIDE 600; 310; 30; 20 MG/100ML; MG/100ML; MG/100ML; MG/100ML
INJECTION, SOLUTION INTRAVENOUS CONTINUOUS
Status: DISCONTINUED | OUTPATIENT
Start: 2023-01-26 | End: 2023-01-26 | Stop reason: HOSPADM

## 2023-01-26 RX ORDER — ONDANSETRON 4 MG/1
4 TABLET, ORALLY DISINTEGRATING ORAL EVERY 6 HOURS PRN
Status: DISCONTINUED | OUTPATIENT
Start: 2023-01-26 | End: 2023-01-26 | Stop reason: HOSPADM

## 2023-01-26 RX ORDER — NALOXONE HYDROCHLORIDE 0.4 MG/ML
0.4 INJECTION, SOLUTION INTRAMUSCULAR; INTRAVENOUS; SUBCUTANEOUS
Status: DISCONTINUED | OUTPATIENT
Start: 2023-01-26 | End: 2023-01-26 | Stop reason: HOSPADM

## 2023-01-26 RX ORDER — LIDOCAINE 40 MG/G
CREAM TOPICAL
Status: DISCONTINUED | OUTPATIENT
Start: 2023-01-26 | End: 2023-01-26 | Stop reason: HOSPADM

## 2023-01-26 RX ORDER — ONDANSETRON 2 MG/ML
INJECTION INTRAMUSCULAR; INTRAVENOUS PRN
Status: DISCONTINUED | OUTPATIENT
Start: 2023-01-26 | End: 2023-01-26

## 2023-01-26 RX ORDER — FLUMAZENIL 0.1 MG/ML
0.2 INJECTION, SOLUTION INTRAVENOUS
Status: DISCONTINUED | OUTPATIENT
Start: 2023-01-26 | End: 2023-01-26 | Stop reason: HOSPADM

## 2023-01-26 RX ORDER — PROPOFOL 10 MG/ML
INJECTION, EMULSION INTRAVENOUS PRN
Status: DISCONTINUED | OUTPATIENT
Start: 2023-01-26 | End: 2023-01-26

## 2023-01-26 RX ADMIN — PROPOFOL 125 MCG/KG/MIN: 10 INJECTION, EMULSION INTRAVENOUS at 07:26

## 2023-01-26 RX ADMIN — SODIUM CHLORIDE, POTASSIUM CHLORIDE, SODIUM LACTATE AND CALCIUM CHLORIDE: 600; 310; 30; 20 INJECTION, SOLUTION INTRAVENOUS at 06:55

## 2023-01-26 RX ADMIN — LIDOCAINE HYDROCHLORIDE 20 ML: 10 INJECTION, SOLUTION INFILTRATION; PERINEURAL at 07:25

## 2023-01-26 RX ADMIN — PROPOFOL 40 MG: 10 INJECTION, EMULSION INTRAVENOUS at 07:25

## 2023-01-26 RX ADMIN — PROPOFOL 20 MG: 10 INJECTION, EMULSION INTRAVENOUS at 07:39

## 2023-01-26 RX ADMIN — ONDANSETRON 4 MG: 2 INJECTION INTRAMUSCULAR; INTRAVENOUS at 07:25

## 2023-01-26 ASSESSMENT — ACTIVITIES OF DAILY LIVING (ADL): ADLS_ACUITY_SCORE: 35

## 2023-01-26 ASSESSMENT — ENCOUNTER SYMPTOMS: DYSRHYTHMIAS: 1

## 2023-01-26 NOTE — ANESTHESIA POSTPROCEDURE EVALUATION
Patient: Romy Izquierdo    Procedure: Procedure(s):  COLONOSCOPY with polypectomy       Anesthesia Type:  MAC    Note:  Disposition: Outpatient   Postop Pain Control: Uneventful            Sign Out: Well controlled pain   PONV: No   Neuro/Psych: Uneventful            Sign Out: Acceptable/Baseline neuro status   Airway/Respiratory: Uneventful            Sign Out: Acceptable/Baseline resp. status   CV/Hemodynamics: Uneventful            Sign Out: Acceptable CV status; No obvious hypovolemia; No obvious fluid overload   Other NRE: NONE   DID A NON-ROUTINE EVENT OCCUR? No           Last vitals:  Vitals Value Taken Time   /70 01/26/23 0815   Temp 36.1  C (96.9  F) 01/26/23 0757   Pulse     Resp 16 01/26/23 0815   SpO2 96 % 01/26/23 0815       Electronically Signed By: Calos Penn MD  January 26, 2023  11:20 AM

## 2023-01-26 NOTE — CONSULTS
Pre-procedure Note    Reason for procedure: Colon polyps, FHx colon cancer    History and Physical Reviewed: Reviewed, no changes.    Pre-sedation assessment:    General: alert, appears stated age, and cooperative  Airway: normal  Heart: regular rate and rhythm  Lungs: clear to auscultation bilaterally    Sedation Plan based on assessment: MAC    Mallampati score: Class II (visualization of the soft palate, fauces, and uvula)          ASA Classification: ASA 3 - Patient with moderate systemic disease with functional limitations    Impression: Patient deemed adequate candidate for MAC sedation    Risks, benefits and alternatives were discussed with the patient and informed consent was obtained.    Plan: colonoscopy      Kwadwo Kline MD 1/26/2023 7:17 AM                                               Kwadwo Kline MD  Thank you for the opportunity to participate in the care of this patient.   Please feel free to call me with any questions or concerns.  Phone number [phone].

## 2023-01-26 NOTE — INTERVAL H&P NOTE
"I have reviewed the surgical (or preoperative) H&P that is linked to this encounter, and examined the patient. There are no significant changes    Clinical Conditions Present on Arrival:  Clinically Significant Risk Factors Present on Admission           # Hypernatremia: Highest Na = 146 mmol/L in last 30 days, will monitor as appropriate          # Obesity: Estimated body mass index is 34.9 kg/m  as calculated from the following:    Height as of 1/12/23: 1.6 m (5' 3\").    Weight as of 1/12/23: 89.4 kg (197 lb).       "

## 2023-01-26 NOTE — ANESTHESIA CARE TRANSFER NOTE
Patient: Romy Izquierdo    Procedure: Procedure(s):  COLONOSCOPY with polypectomy       Diagnosis: Screening for colon cancer [Z12.11]  Diagnosis Additional Information: No value filed.    Anesthesia Type:   MAC     Note:    Oropharynx: oropharynx clear of all foreign objects and spontaneously breathing  Level of Consciousness: awake  Oxygen Supplementation: face mask  Level of Supplemental Oxygen (L/min / FiO2): 8  Independent Airway: airway patency satisfactory and stable  Dentition: dentition unchanged  Vital Signs Stable: post-procedure vital signs reviewed and stable  Report to RN Given: handoff report given  Patient transferred to: Phase II    Handoff Report: Identifed the Patient, Identified the Reponsible Provider, Reviewed the pertinent medical history, Discussed the surgical course, Reviewed Intra-OP anesthesia mangement and issues during anesthesia, Set expectations for post-procedure period and Allowed opportunity for questions and acknowledgement of understanding      Vitals:  Vitals Value Taken Time   /82    Temp 36    Pulse 66    Resp 18    SpO2 97        Electronically Signed By: SJ Howell CRNA  January 26, 2023  7:59 AM

## 2023-01-26 NOTE — ANESTHESIA PREPROCEDURE EVALUATION
Anesthesia Pre-Procedure Evaluation    Patient: Romy Izquierdo   MRN: [id number] : 1950        Procedure : Procedure(s):  COLONOSCOPY          Past Medical History:   Diagnosis Date     Hypertension      Hypertrophic cardiomyopathy (H) 01/15/2021    Severe outflow tract obstruction Preserved LV systolic performance Basal septum: 20 mm Gadolinium enhancement positive: Basal septum No major positive and no other minor positive risk factors identified.     Obese      Paroxysmal atrial fibrillation (H) 01/15/2021    Dx 2020 (NAIF) [id number] score = 3 Rx apixaban     Parsonage-Mcknight syndrome       Past Surgical History:   Procedure Laterality Date     EP LOOP RECORDER IMPLANT N/A 2021    Procedure: EP Loop Recorder Insertion;  Surgeon: Paul Garza MD;  Location: [facility];  Service: Cardiology     EXCISE BREAST CYST/FIBROADENOMA/TUMOR/DUCT LESION/NIPPLE LESION/AREOLAR LESION      Description: Breast Surgery Lumpectomy;  Recorded: 2012;  Comments: adenoma     HC EXCISION NASAL POLYP(S), EXTENSIVE      Description: Extensive Excision Of Nasal Polyps;  Recorded: 2012;     HC KNEE SCOPE, DIAGNOSTIC      Description: Arthroscopy Knee Left;  Recorded: 2012;     VT EXCIS TENDON SHEATH LESION, HAND/FINGER      Description: Hand Excision Of A Tendon Cyst;  Recorded: 2012;     US GUIDED NEEDLE PLACEMENT  2020     ZZC REMOVAL OF OVARY(S)      Description: Oophorectomy;  Recorded: 2013;      Allergies   Allergen Reactions     Shellfish Allergy Shortness Of Breath     Midazolam Hives     Welts     Acetaminophen      Dust Mites Other (See Comments)     Stuffy nose     Molds & Smuts      Other reaction(s): Wheezing  [place] grass,dust     Adhesive Tape Rash      Social History     Tobacco Use     Smoking status: Never     Smokeless tobacco: Never   Substance Use Topics     Alcohol use: Never      Wt Readings from Last 1 Encounters:   23 89.4 kg  (197 lb)        Anesthesia Evaluation   Pt has had prior anesthetic.         ROS/MED HX  ENT/Pulmonary:  - neg pulmonary ROS     Neurologic:  - neg neurologic ROS     Cardiovascular:     (+) hypertension-----dysrhythmias, a-fib, valvular problems/murmurs (HOCUM)     METS/Exercise Tolerance:     Hematologic:  - neg hematologic  ROS     Musculoskeletal:  - neg musculoskeletal ROS     GI/Hepatic:  - neg GI/hepatic ROS     Renal/Genitourinary:  - neg Renal ROS     Endo:     (+) Obesity,     Psychiatric/Substance Use:  - neg psychiatric ROS     Infectious Disease:  - neg infectious disease ROS     Malignancy:  - neg malignancy ROS     Other:  - neg other ROS          Physical Exam    Airway  airway exam normal      Mallampati: II   TM distance: > 3 FB   Neck ROM: full   Mouth opening: > 3 cm    Respiratory Devices and Support         Dental       (+) Minor Abnormalities - some fillings, tiny chips      Cardiovascular   cardiovascular exam normal       Rhythm and rate: regular and normal     Pulmonary   pulmonary exam normal        breath sounds clear to auscultation           OUTSIDE LABS:  CBC:   Lab Results   Component Value Date    WBC 8.8 01/12/2023    WBC 8.0 07/23/2021    HGB 14.8 01/12/2023    HGB 12.4 07/23/2021    HCT 45.2 01/12/2023    HCT 40.3 07/23/2021     01/12/2023     07/23/2021     BMP:   Lab Results   Component Value Date     (H) 01/12/2023     07/23/2021    POTASSIUM 4.0 01/12/2023    POTASSIUM 3.4 (L) 07/23/2021    CHLORIDE 106 01/12/2023    CHLORIDE 106 07/23/2021    CO2 26 01/12/2023    CO2 30 07/23/2021    BUN 17.9 01/12/2023    BUN 21 07/23/2021    CR 0.79 01/12/2023    CR 0.9 09/02/2022     (H) 01/12/2023    GLC 99 07/23/2021     COAGS: No results found for: PTT, INR, FIBR  POC: No results found for: BGM, HCG, HCGS  HEPATIC:   Lab Results   Component Value Date    ALBUMIN 4.1 01/12/2023    PROTTOTAL 6.7 01/12/2023    ALT 18 01/12/2023    AST 29 01/12/2023     ALKPHOS 65 01/12/2023    BILITOTAL 0.5 01/12/2023     OTHER:   Lab Results   Component Value Date    EMMANUEL 9.5 01/12/2023    MAG 2.2 07/23/2021    TSH 2.48 05/27/2020       Anesthesia Plan    ASA Status:  3   NPO Status:  NPO Appropriate    Anesthesia Type: MAC.     - Reason for MAC: straight local not clinically adequate              Consents    Anesthesia Plan(s) and associated risks, benefits, and realistic alternatives discussed. Questions answered and patient/representative(s) expressed understanding.    - Discussed:     - Discussed with:  Patient      - Patient is DNR/DNI Status: No         Postoperative Care    Pain management: Multi-modal analgesia.   PONV prophylaxis: Ondansetron (or other 5HT-3)     Comments:                Calos Penn MD

## 2023-01-27 LAB
PATH REPORT.COMMENTS IMP SPEC: NORMAL
PATH REPORT.COMMENTS IMP SPEC: NORMAL
PATH REPORT.FINAL DX SPEC: NORMAL
PATH REPORT.GROSS SPEC: NORMAL
PATH REPORT.MICROSCOPIC SPEC OTHER STN: NORMAL
PATH REPORT.RELEVANT HX SPEC: NORMAL
PHOTO IMAGE: NORMAL

## 2023-01-27 PROCEDURE — 88305 TISSUE EXAM BY PATHOLOGIST: CPT | Mod: 26 | Performed by: PATHOLOGY

## 2023-02-18 ENCOUNTER — NURSE TRIAGE (OUTPATIENT)
Dept: NURSING | Facility: CLINIC | Age: 73
End: 2023-02-18
Payer: COMMERCIAL

## 2023-02-18 NOTE — TELEPHONE ENCOUNTER
Patient calling.    Reports feeling fatigued on Thursday. Yesterday she developed a stuffy and runny nose, cough.   Negative for Covid. No known exposure to flu or covid.     Treating symptoms with ricola honey lozenges, flonase once per day, drinking warm fluids, and steam showers. Reports she does get temporary relief with these treatments.    Disposition: Home care. Care advice given. Advised to call back with no change or worsening symptoms. Patient verbalized understanding.    Lucie Velasco RN on 2/18/2023 at 5:35 PM    Reason for Disposition    [1] Sinus congestion as part of a cold AND [2] present < 10 days    Additional Information    Negative: SEVERE difficulty breathing (e.g., struggling for each breath, speaks in single words)    Negative: Sounds like a life-threatening emergency to the triager    Negative: [1] Difficulty breathing AND [2] not from stuffy nose (e.g., not relieved by cleaning out the nose)    Negative: [1] SEVERE headache AND [2] fever    Negative: [1] Redness or swelling on the cheek, forehead or around the eye AND [2] fever    Negative: Fever > 104 F (40 C)    Negative: Patient sounds very sick or weak to the triager    Negative: [1] SEVERE pain AND [2] not improved 2 hours after pain medicine    Negative: [1] Redness or swelling on the cheek, forehead or around the eye AND [2] no fever    Negative: [1] Fever > 101 F (38.3 C) AND [2] age > 60 years    Negative: [1] Fever > 100.0 F (37.8 C) AND [2] bedridden (e.g., nursing home patient, CVA, chronic illness, recovering from surgery)    Negative: [1] Fever > 100.0 F (37.8 C) AND [2] diabetes mellitus or weak immune system (e.g., HIV positive, cancer chemo, splenectomy, organ transplant, chronic steroids)    Negative: Fever present > 3 days (72 hours)    Negative: [1] Fever returns after gone for over 24 hours AND [2] symptoms worse or not improved    Negative: [1] Sinus pain (not just congestion) AND [2] fever    Negative: Earache     Negative: [1] Sinus congestion (pressure, fullness) AND [2] present > 10 days    Negative: [1] Nasal discharge AND [2] present > 10 days    Negative: [1] Using nasal washes and pain medicine > 24 hours AND [2] sinus pain (around cheekbone or eye) persists    Negative: Lots of coughing    Protocols used: SINUS PAIN OR CONGESTION-A-AH

## 2023-02-21 ENCOUNTER — VIRTUAL VISIT (OUTPATIENT)
Dept: FAMILY MEDICINE | Facility: CLINIC | Age: 73
End: 2023-02-21
Payer: COMMERCIAL

## 2023-02-21 ENCOUNTER — NURSE TRIAGE (OUTPATIENT)
Dept: NURSING | Facility: CLINIC | Age: 73
End: 2023-02-21

## 2023-02-21 DIAGNOSIS — J01.90 ACUTE SINUSITIS WITH COEXISTING CONDITION, NEED PROPHYLACTIC TREATMENT: Primary | ICD-10-CM

## 2023-02-21 PROCEDURE — 99441 PR PHYSICIAN TELEPHONE EVALUATION 5-10 MIN: CPT | Mod: 95 | Performed by: FAMILY MEDICINE

## 2023-02-21 NOTE — TELEPHONE ENCOUNTER
Patient calling back, same symptoms as previous triage. Looking to have provider see if medications would be appropriate. Explained that she can do a VV with Urgent Care.   Came down with something, symptoms since Thursday taken COVID test twice and is negative  Very congested,  coughing up yellow sputum, blowing nose and has lots yellow mucous.Unsure if she had fever but has chills. Transferred to schedule a telephone Urgent Care visit this evening.  Claudia Moy RN on 2/21/2023 at 3:58 PM

## 2023-02-21 NOTE — PROGRESS NOTES
Romy is a 72 year old who is being evaluated via a billable telephone visit.      What phone number would you like to be contacted at? [phone]  How would you like to obtain your AVS? Al[place]hart    Distant Location (provider location):  On-site    Assessment & Plan     Acute sinusitis with coexisting condition, need prophylactic treatment  Differential discussed in detail including acute sinusitis.  Home COVID-19 tests negative.  History of recurrent sinus infections.  Augmentin prescribed, common side effects discussed.  Recommended well hydration, warm fluids, steam inhalation, humidifier use and over-the-counter antihistamine.  Recommended to follow-up if symptoms persist or worsen.  Patient understood and in agreement with above plan.  All questions answered.  - amoxicillin-clavulanate (AUGMENTIN) 875-125 MG tablet; Take 1 tablet by mouth 2 times daily for 10 days      Jadon Pelaez MD  [facility]   Romy is a 72 year old, presenting for the following health issues:  Sinus Problem      HPI     Concern - Sinus - 2 covid tests were negative   Onset: 6 days   Description: Fatigue, nasal drip in back of throat, congestion. Yellow mucous. Chills   Intensity: moderate  Progression of Symptoms:  worsening  Accompanying Signs & Symptoms: deep cough  Previous history of similar problem:   Therapies tried and outcome: salt water gargle, saline nasal spray, steam showers       Review of Systems   Constitutional, HEENT, cardiovascular, pulmonary, gi and gu systems are negative, except as otherwise noted.      Objective           Vitals:  No vitals were obtained today due to virtual visit.    Physical Exam   alert and no distress  PSYCH: Alert and oriented times 3; coherent speech, normal   rate and volume, able to articulate logical thoughts, able   to abstract reason, no tangential thoughts, no hallucinations   or delusions  Her affect is normal  RESP: No cough, no  audible wheezing, able to talk in full sentences  Remainder of exam unable to be completed due to telephone visits            Phone call duration: 7 minutes

## 2023-03-23 ENCOUNTER — ANCILLARY ORDERS (OUTPATIENT)
Dept: CARDIOLOGY | Facility: CLINIC | Age: 73
End: 2023-03-23

## 2023-03-23 ENCOUNTER — ANCILLARY PROCEDURE (OUTPATIENT)
Dept: CARDIOLOGY | Facility: CLINIC | Age: 73
End: 2023-03-23
Attending: INTERNAL MEDICINE
Payer: COMMERCIAL

## 2023-03-23 DIAGNOSIS — Z98.890 HISTORY OF LOOP RECORDER: ICD-10-CM

## 2023-03-23 DIAGNOSIS — I42.9 CARDIOMYOPATHY (H): ICD-10-CM

## 2023-03-23 PROCEDURE — G2066 INTER DEVC REMOTE 30D: HCPCS | Performed by: INTERNAL MEDICINE

## 2023-03-23 PROCEDURE — 93297 REM INTERROG DEV EVAL ICPMS: CPT | Performed by: INTERNAL MEDICINE

## 2023-03-26 LAB
MDC_IDC_EPISODE_DTM: NORMAL
MDC_IDC_EPISODE_DURATION: 18.73 S
MDC_IDC_EPISODE_DURATION: 3.18 S
MDC_IDC_EPISODE_DURATION: 3.37 S
MDC_IDC_EPISODE_DURATION: 3.38 S
MDC_IDC_EPISODE_DURATION: 3.39 S
MDC_IDC_EPISODE_DURATION: 3.52 S
MDC_IDC_EPISODE_DURATION: 3.68 S
MDC_IDC_EPISODE_DURATION: 3.72 S
MDC_IDC_EPISODE_DURATION: 4.05 S
MDC_IDC_EPISODE_DURATION: 4.07 S
MDC_IDC_EPISODE_DURATION: 4.48 S
MDC_IDC_EPISODE_DURATION: 4.68 S
MDC_IDC_EPISODE_DURATION: 5.12 S
MDC_IDC_EPISODE_DURATION: 5.23 S
MDC_IDC_EPISODE_DURATION: 7.14 S
MDC_IDC_EPISODE_ID: 356
MDC_IDC_EPISODE_ID: 357
MDC_IDC_EPISODE_ID: 358
MDC_IDC_EPISODE_ID: 359
MDC_IDC_EPISODE_ID: 360
MDC_IDC_EPISODE_ID: 361
MDC_IDC_EPISODE_ID: 362
MDC_IDC_EPISODE_ID: 363
MDC_IDC_EPISODE_ID: 364
MDC_IDC_EPISODE_ID: 365
MDC_IDC_EPISODE_ID: 366
MDC_IDC_EPISODE_ID: 367
MDC_IDC_EPISODE_ID: 368
MDC_IDC_EPISODE_ID: 369
MDC_IDC_EPISODE_ID: 370
MDC_IDC_EPISODE_TYPE: NORMAL
MDC_IDC_MSMT_BATTERY_STATUS: NORMAL
MDC_IDC_PG_IMPLANT_DTM: NORMAL
MDC_IDC_PG_MFG: NORMAL
MDC_IDC_PG_MODEL: NORMAL
MDC_IDC_PG_SERIAL: NORMAL
MDC_IDC_PG_TYPE: NORMAL
MDC_IDC_SESS_CLINIC_NAME: NORMAL
MDC_IDC_SESS_DTM: NORMAL
MDC_IDC_SESS_TYPE: NORMAL
MDC_IDC_SET_ZONE_DETECTION_INTERVAL: 2000 MS
MDC_IDC_SET_ZONE_DETECTION_INTERVAL: 3000 MS
MDC_IDC_SET_ZONE_DETECTION_INTERVAL: 370 MS
MDC_IDC_SET_ZONE_TYPE: NORMAL
MDC_IDC_STAT_AT_BURDEN_PERCENT: 0 %
MDC_IDC_STAT_AT_DTM_END: NORMAL
MDC_IDC_STAT_AT_DTM_START: NORMAL
MDC_IDC_STAT_EPISODE_RECENT_COUNT: 0
MDC_IDC_STAT_EPISODE_RECENT_COUNT: 15
MDC_IDC_STAT_EPISODE_RECENT_COUNT_DTM_END: NORMAL
MDC_IDC_STAT_EPISODE_RECENT_COUNT_DTM_START: NORMAL
MDC_IDC_STAT_EPISODE_TOTAL_COUNT: 0
MDC_IDC_STAT_EPISODE_TOTAL_COUNT: 1
MDC_IDC_STAT_EPISODE_TOTAL_COUNT: 33
MDC_IDC_STAT_EPISODE_TOTAL_COUNT: 336
MDC_IDC_STAT_EPISODE_TOTAL_COUNT_DTM_END: NORMAL
MDC_IDC_STAT_EPISODE_TOTAL_COUNT_DTM_START: NORMAL
MDC_IDC_STAT_EPISODE_TYPE: NORMAL

## 2023-04-06 ENCOUNTER — TRANSFERRED RECORDS (OUTPATIENT)
Dept: HEALTH INFORMATION MANAGEMENT | Facility: CLINIC | Age: 73
End: 2023-04-06
Payer: COMMERCIAL

## 2023-04-19 ENCOUNTER — MYC MEDICAL ADVICE (OUTPATIENT)
Dept: INTERNAL MEDICINE | Facility: CLINIC | Age: 73
End: 2023-04-19

## 2023-04-26 ENCOUNTER — OFFICE VISIT (OUTPATIENT)
Dept: CARDIOLOGY | Facility: CLINIC | Age: 73
End: 2023-04-26
Attending: INTERNAL MEDICINE
Payer: COMMERCIAL

## 2023-04-26 VITALS
WEIGHT: 195 LBS | DIASTOLIC BLOOD PRESSURE: 88 MMHG | RESPIRATION RATE: 18 BRPM | HEART RATE: 62 BPM | SYSTOLIC BLOOD PRESSURE: 138 MMHG | BODY MASS INDEX: 34.54 KG/M2 | OXYGEN SATURATION: 98 %

## 2023-04-26 DIAGNOSIS — I50.32 CHRONIC DIASTOLIC CONGESTIVE HEART FAILURE (H): ICD-10-CM

## 2023-04-26 DIAGNOSIS — I48.0 PAROXYSMAL ATRIAL FIBRILLATION (H): ICD-10-CM

## 2023-04-26 DIAGNOSIS — E87.6 HYPOKALEMIA: ICD-10-CM

## 2023-04-26 DIAGNOSIS — I10 ESSENTIAL HYPERTENSION: ICD-10-CM

## 2023-04-26 DIAGNOSIS — I42.2 HYPERTROPHIC CARDIOMYOPATHY (H): ICD-10-CM

## 2023-04-26 PROCEDURE — 99214 OFFICE O/P EST MOD 30 MIN: CPT | Performed by: INTERNAL MEDICINE

## 2023-04-26 RX ORDER — FUROSEMIDE 20 MG
40 TABLET ORAL DAILY PRN
Qty: 20 TABLET | Refills: 11 | Status: SHIPPED | OUTPATIENT
Start: 2023-04-26

## 2023-04-26 NOTE — PROGRESS NOTES
[facility] HEART CARE   [address]  [place] [address]   [email or web address]   OFFICE: [phone]     CARDIOLOGY CLINIC NOTE    Thank you, Diana Mahoney, for asking the [facility] Heart Care team to see Ms. Romy Izquierdo to Follow Up (HOCM)        Assessment/Recommendations   Assessment:    1. Hypertrophic cardiomyopathy with dynamic LVOT gradient -  Now s/p septal myectomy at [facility] on 5/26/21. Stable.   2. Hypertension - well-controlled.  3. Paroxysmal atrial fibrillation - with RVR - s/p surgical pulmonary vein isolation. No subsequent afib on ILR interrogation.  4. 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 Confirmed to be ligated by CT. Off of OAC and on aspirin alone.  5. Mild coronary artery disease with stenosis of 20-30% in all major coronary arteries - stable without angina.  6. Parsonage-monet syndrome with paralyzed right hemidiaphragm. With mild chronic dyspnea on exertion.  7. chronic congestive heart failure with preserved LVEF - currently compensated    Plan:  1. Continue medications without changes. Can continue to use furosemide as needed for fluid retention  2. Encouraged continued regular exercise  3. Follow up in 6 months with an echo prior.         History of Present Illness   Ms. Romy Izquierdo is a 71 year old female with a significant past history of hypertension who presents for follow-up of HOCM and atrial fibrillation.      has hypertrophic obstructive cardiomyopathy with a prior echo performed on 8/20/2020 that demonstrated progression of the LVOT gradient, peaking at 100 mmHg, which is increased from 40 mmHg a year ago. This does not result in significant mitral valve regurgitation but asymmetric septal hypertrophy was noted. She has had one episode of syncope in her lifetime that was attributed to Parsonage-Monet syndrome. A cardiac MRI confirmed the diagnosis of HOCM. With an LVOT peak  gradient of 80 mmHg and paroxysmal atrial fibrillation she was referred for septal myectomy. Her surgery occurred on 5/26/21 and consisted of a septal myectomy with pulmonary vein isolation and left atrial appendage excision. Her post-operative course was notable for paroxysmal atrial fibrillation treated with amiodarone for several weeks. She has not had recurrent afib and is monitored with an implantable loop recorder.    Today, Romy is feeling generally well. She is exercising regularly at the fitness center using the exercise equipment and water aerobics. She has mild chronic ELIZALDE but denies any new or worsening exertional intolerance. She reports occasional water retention, particularly after having a salty meal that generally resolves on its own or with the help of furosemide prn.    Other than noted above, Ms. Izquierdo denies any chest pain/pressure/tightness, shortness of breath at rest or with exertion, light headedness/dizziness, pre-syncope, syncope, lower extremity swelling, palpitations, paroxysmal nocturnal dyspnea (PND), or orthopnea.     Cardiac Problems and Cardiac Diagnostics     Most Recent Cardiac testing:  ECG dated 7/23/21 (personaly reviewed and interpreted): sinus rhythm with first degree AV block, left axis deviation and LBBB    Cardiac rhythm monitor 11/16/2020  CONCLUSION:  Symptoms of heart racing on 1 occasion associated with atrial flutter.   Symptoms of shortness of breath on 9 occasions, associated with sinus rhythm and  first-degree AV block. Paroxysmal atrial fibrillation was present on 2 days with a  total of 5 hours and 6 hours, respectively with average ventricular response of  approximately 85 beats per minute and peak ventricular response of approximately 110  beats per minute.  A single short run of accelerated idioventricular rhythm was  noted on auto transmissions.  Rather marked nocturnal sinus bradycardia with heart  rates in the 30s were noted on multiple days.     ECHO  (report reviewed):   TTE 7/1/21    Normal left ventricular size with severe hypertrophy.    Left ventricle ejection fraction is normal. The calculated left ventricular ejection fraction is 64%.    Normal right ventricular size and systolic function.    Systolic anterior motion of the anterior leaflet of the mitral valve is noted without significant mitral regurgitation.    The peak gradient across the LVOT and aortic valve is 11 mmHg without evidence of dynamic LVOT obstruction.    A left pleural effusion and trivial pericardial effusion are noted.    When compared to the previous study dated 1/7/2021, a dynamic LVOT obstruction is no longer present. The peak outflow tract gradient has reduced from 81 to 11 mmHg. The pleural and pericardial effusions are new.     Cardiac MRI 9/21/2020  IMPRESSIONS:    1.  Normal left ventricular size, thickened septum with maximal measurement 18-20 mm, inferolateral wall 7-8 mm. The quantified left ventricular ejection fraction is 69%. It appears less deformation of thickened septum per tagging images. There is no rest perfusion defect. It is evident that there is LVOT obstruction by flow turbulence and EMELYN.  The late delayed gadolinium enhancement study demonstrated that there are patchy gadolinium enhancement in thickened basal septal segment and also in basal lateral segment. Put all together, the findings are consistent with hypertrophic cardiomyopathy.   2.  Normal right ventricular size function.    3.  Mildly enlarged both atria.  4.  Moderate mitral valve regurgitation.  5.  Mildly dilated mid ascending aorta 40 mm.     Cardiac cath: from 5/25/21 at [facility] demonstrated   1.  CORONARY ANGIOGRAPHY   FINAL DIAGNOSIS   1.  Mild coronary artery atherosclerosis   PRE-PROCEDURE DIAGNOSIS   1.  Cardiomyopathy Hypertrophic (HCC)   CORONARY DIAGNOSTIC SUMMARY   Coronary artery dominance is right. Normal right coronary.   The left main coronary artery is 30% obstructed by a  discrete lesion and 20% obstructed by a discrete lesion.   The proximal left anterior descending artery is 20% obstructed by a discrete lesion.   The middle left anterior descending artery is 20% obstructed by a tubular lesion.   The distal left anterior descending artery is 20% obstructed by a discrete lesion.   Noted systolic compression of septal  arteries, likely consistent with patient's known hypertrophic cardiomyopathy.     CT pulmonary vein 9/2/22    Complete surgical excision of the left atrial appendage.    No evidence of intracardiac thrombus.    Status post surgical myectomy of the left ventricular septum.    No significant coronary artery disease.         Medications  Allergies   Current Outpatient Medications   Medication Sig Dispense Refill     aspirin (ASA) 81 MG EC tablet Take 1 tablet (81 mg) by mouth daily 90 tablet 3     Biotin 10 MG CAPS Take 1 capsule by mouth daily       Cetaphil Moisturizing (CETAPHIL) external lotion Apply 1 Application topically daily       cycloSPORINE (RESTASIS) 0.05 % ophthalmic emulsion Place 1 drop into both eyes       furosemide (LASIX) 20 MG tablet Take 2 tablets (40 mg) by mouth daily as needed (fluid retention) 20 tablet 11     metoprolol succinate ER (TOPROL XL) 50 MG 24 hr tablet TAKE 1 TABLET(50 MG) BY MOUTH TWICE DAILY 180 tablet 3     multivitamin (CENTRUM SILVER) tablet Take 2 tablets by mouth daily       Omega-3 Fatty Acids (RA FISH OIL) 1000 MG CAPS 2 capsules in the morning and 1 capsule at night       sodium chloride (OCEAN) 0.65 % nasal spray 1 spray daily as needed       SUMAtriptan (IMITREX) 50 MG tablet Take 1 tablet (50 mg) by mouth as needed for migraine 10 tablet 2     Turmeric Curcumin 500 MG CAPS Take 1 tablet by mouth daily        Allergies   Allergen Reactions     Shellfish Allergy Shortness Of Breath     Midazolam Hives     Welts     Acetaminophen      Dust Mites Other (See Comments)     Stuffy nose     Molds & Smuts      Other  reaction(s): Wheezing  [place] blue grass,dust     Adhesive Tape Rash        Physical Examination Review of Systems   Vitals: /88 (BP Location: Right arm, Patient Position: Sitting, Cuff Size: Adult Regular)   Pulse 62   Resp 18   Wt 88.5 kg (195 lb)   SpO2 98%   BMI 34.54 kg/m    BMI= Body mass index is 34.54 kg/m .  Wt Readings from Last 3 Encounters:   04/26/23 88.5 kg (195 lb)   01/12/23 89.4 kg (197 lb)   07/22/22 88 kg (193 lb 14.4 oz)       General Appearance:   Pleasant female, appears stated age. no acute distress, overweight body habitus   ENT/Mouth: membranes moist, no apparent gingival bleeding.      EYES:  no scleral icterus, normal conjunctivae   Neck: supple   Respiratory:   lungs are clear to auscultation, no rales or wheezing, equal chest wall expansion    Cardiovascular:   Regular rhythm, normal rate. Normal first and second heart sounds with 2/6 systolic murmur at upper sternal border. no rubs or gallops; Jugular venous pressure normal, no edema bilaterally    Extremities: no cyanosis or clubbing   Skin: no xanthelasma, warm.    Heme/lymph/ Immunology No apparent bleeding noted.   Neurologic: Alert and oriented. normal gait, no tremors   Psychiatric: Pleasant, calm, appropriate affect.         Please refer above for cardiac ROS details.       Past History   Past Medical History:   Past Medical History:   Diagnosis Date     Hypertension      Hypertrophic cardiomyopathy (H) 01/15/2021    Severe outflow tract obstruction Preserved LV systolic performance Basal septum: 20 mm Gadolinium enhancement positive: Basal septum No major positive and no other minor positive risk factors identified.     Obese      Paroxysmal atrial fibrillation (H) 01/15/2021    Dx Nov 2020 (NAIF) [id number] score = 3 Rx apixaban     Parsonage-Mcknight syndrome         Past Surgical History:   Past Surgical History:   Procedure Laterality Date     COLONOSCOPY N/A 1/26/2023    Procedure: COLONOSCOPY with polypectomy;   Surgeon: Kwadwo Kline MD;  Location: [facility] Main OR     EP LOOP RECORDER IMPLANT N/A 1/29/2021    Procedure: EP Loop Recorder Insertion;  Surgeon: Paul Garza MD;  Location: [facility] Cardiac Cath Lab;  Service: Cardiology     EXCISE BREAST CYST/FIBROADENOMA/TUMOR/DUCT LESION/NIPPLE LESION/AREOLAR LESION      Description: Breast Surgery Lumpectomy;  Recorded: 03/07/2012;  Comments: adenoma     HC EXCISION NASAL POLYP(S), EXTENSIVE      Description: Extensive Excision Of Nasal Polyps;  Recorded: 03/07/2012;     HC KNEE SCOPE, DIAGNOSTIC      Description: Arthroscopy Knee Left;  Recorded: 03/07/2012;     MS EXCIS TENDON SHEATH LESION, HAND/FINGER      Description: Hand Excision Of A Tendon Cyst;  Recorded: 03/07/2012;     US GUIDED NEEDLE PLACEMENT  7/20/2020     ZZC REMOVAL OF OVARY(S)      Description: Oophorectomy;  Recorded: 06/26/2013;        Family History:   Family History   Problem Relation Age of Onset     Pacemaker Father      Coronary Artery Disease Father 65.00     Colon Cancer Father         XRT and chemo     Heart Failure Father 79.00     Cerebrovascular Disease Mother 89.00     Hypertension Mother      Endometrial Cancer Mother      Other - See Comments Mother         Scarlet fever vs rheumatic fever     Other - See Comments Cousin 16.00        Drown, Hypertrophic (apparently via father - not realted)     Cardiomyopathy Cousin         Hypertrophic (apparently via father - not realted)     Cardiomyopathy Child         Hypertrophic (apparently via father - not realted)     Sudden Death No family hx of         Social History:   Social History     Socioeconomic History     Marital status:      Spouse name: Not on file     Number of children: Not on file     Years of education: Not on file     Highest education level: Not on file   Occupational History     Not on file   Tobacco Use     Smoking status: Never     Smokeless tobacco: Never   Vaping Use     Vaping status: Never Used    Substance and Sexual Activity     Alcohol use: Never     Drug use: Never     Sexual activity: Not on file   Other Topics Concern     Not on file   Social History Narrative     Not on file     Social Determinants of Health     Financial Resource Strain: Not on file   Food Insecurity: Not on file   Transportation Needs: Not on file   Physical Activity: Not on file   Stress: Not on file   Social Connections: Not on file   Intimate Partner Violence: Not on file   Housing Stability: Not on file            Lab Results    Chemistry/lipid CBC Cardiac Enzymes/BNP/TSH/INR   Lab Results   Component Value Date    CHOL 210 (H) 01/12/2023    HDL 64 01/12/2023    TRIG 106 01/12/2023    BUN 17.9 01/12/2023     (H) 01/12/2023    CO2 26 01/12/2023    Lab Results   Component Value Date    WBC 8.8 01/12/2023    HGB 14.8 01/12/2023    HCT 45.2 01/12/2023    MCV 87 01/12/2023     01/12/2023    Lab Results   Component Value Date    TROPONINI 0.03 07/23/2021     (H) 07/23/2021    TSH 2.48 05/27/2020

## 2023-04-26 NOTE — LETTER
4/26/2023    Diana Coello, CLIVE  [address] [address]    RE: Romy Izquierdo       Dear Colleague,     I had the pleasure of seeing Romy Izquierdo in the [facility].    [facility]   [address]  [place] [address]   [email or web address]   OFFICE: [phone]     CARDIOLOGY CLINIC NOTE    Thank you, Diana Mahoney, for asking the [facility] team to see Ms. Romy Izquierdo to Follow Up (HOCM)        Assessment/Recommendations   Assessment:    Hypertrophic cardiomyopathy with dynamic LVOT gradient -  Now s/p septal myectomy at [facility] on 5/26/21. Stable.   Hypertension - well-controlled.  Paroxysmal atrial fibrillation - with RVR - s/p surgical pulmonary vein isolation. No subsequent afib on ILR interrogation.  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 Confirmed to be ligated by CT. Off of OAC and on aspirin alone.  Mild coronary artery disease with stenosis of 20-30% in all major coronary arteries - stable without angina.  Parsonage-monet syndrome with paralyzed right hemidiaphragm. With mild chronic dyspnea on exertion.  chronic congestive heart failure with preserved LVEF - currently compensated    Plan:  Continue medications without changes. Can continue to use furosemide as needed for fluid retention  Encouraged continued regular exercise  Follow up in 6 months with an echo prior.         History of Present Illness   Ms. Romy Izquierdo is a 71 year old female with a significant past history of hypertension who presents for follow-up of HOCM and atrial fibrillation.      has hypertrophic obstructive cardiomyopathy with a prior echo performed on 8/20/2020 that demonstrated progression of the LVOT gradient, peaking at 100 mmHg, which is increased from 40 mmHg a year ago. This does not result in significant mitral valve regurgitation but  asymmetric septal hypertrophy was noted. 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 A cardiac MRI confirmed the diagnosis of HOCM. With an LVOT peak gradient of 80 mmHg and paroxysmal atrial fibrillation she was referred for septal myectomy. Her surgery occurred on 5/26/21 and consisted of a septal myectomy with pulmonary vein isolation and left atrial appendage excision. Her post-operative course was notable for paroxysmal atrial fibrillation treated with amiodarone for several weeks. She has not had recurrent afib and is monitored with an implantable loop recorder.    Today, Romy is feeling generally well. She is exercising regularly at the fitness center using the exercise equipment and water aerobics. She has mild chronic ELIZALDE but denies any new or worsening exertional intolerance. She reports occasional water retention, particularly after having a salty meal that generally resolves on its own or with the help of furosemide prn.    Other than noted above, Ms. Izquierdo denies any chest pain/pressure/tightness, shortness of breath at rest or with exertion, light headedness/dizziness, pre-syncope, syncope, lower extremity swelling, palpitations, paroxysmal nocturnal dyspnea (PND), or orthopnea.     Cardiac Problems and Cardiac Diagnostics     Most Recent Cardiac testing:  ECG dated 7/23/21 (personaly reviewed and interpreted): sinus rhythm with first degree AV block, left axis deviation and LBBB    Cardiac rhythm monitor 11/16/2020  CONCLUSION:  Symptoms of heart racing on 1 occasion associated with atrial flutter.   Symptoms of shortness of breath on 9 occasions, associated with sinus rhythm and  first-degree AV block. Paroxysmal atrial fibrillation was present on 2 days with a  total of 5 hours and 6 hours, respectively with average ventricular response of  approximately 85 beats per minute and peak ventricular response of approximately 110  beats per  minute.  A single short run of accelerated idioventricular rhythm was  noted on auto transmissions.  Rather marked nocturnal sinus bradycardia with heart  rates in the 30s were noted on multiple days.     ECHO (report reviewed):   TTE 7/1/21    Normal left ventricular size with severe hypertrophy.    Left ventricle ejection fraction is normal. The calculated left ventricular ejection fraction is 64%.    Normal right ventricular size and systolic function.    Systolic anterior motion of the anterior leaflet of the mitral valve is noted without significant mitral regurgitation.    The peak gradient across the LVOT and aortic valve is 11 mmHg without evidence of dynamic LVOT obstruction.    A left pleural effusion and trivial pericardial effusion are noted.    When compared to the previous study dated 1/7/2021, a dynamic LVOT obstruction is no longer present. The peak outflow tract gradient has reduced from 81 to 11 mmHg. The pleural and pericardial effusions are new.     Cardiac MRI 9/21/2020  IMPRESSIONS:    1.  Normal left ventricular size, thickened septum with maximal measurement 18-20 mm, inferolateral wall 7-8 mm. The quantified left ventricular ejection fraction is 69%. It appears less deformation of thickened septum per tagging images. There is no rest perfusion defect. It is evident that there is LVOT obstruction by flow turbulence and EMELYN.  The late delayed gadolinium enhancement study demonstrated that there are patchy gadolinium enhancement in thickened basal septal segment and also in basal lateral segment. Put all together, the findings are consistent with hypertrophic cardiomyopathy.   2.  Normal right ventricular size function.    3.  Mildly enlarged both atria.  4.  Moderate mitral valve regurgitation.  5.  Mildly dilated mid ascending aorta 40 mm.     Cardiac cath: from 5/25/21 at [facility] demonstrated   1.  CORONARY ANGIOGRAPHY   FINAL DIAGNOSIS   1.  Mild coronary artery atherosclerosis    PRE-PROCEDURE DIAGNOSIS   1.  Cardiomyopathy Hypertrophic (HCC)   CORONARY DIAGNOSTIC SUMMARY   Coronary artery dominance is right. Normal right coronary.   The left main coronary artery is 30% obstructed by a discrete lesion and 20% obstructed by a discrete lesion.   The proximal left anterior descending artery is 20% obstructed by a discrete lesion.   The middle left anterior descending artery is 20% obstructed by a tubular lesion.   The distal left anterior descending artery is 20% obstructed by a discrete lesion.   Noted systolic compression of septal  arteries, likely consistent with patient's known hypertrophic cardiomyopathy.     CT pulmonary vein 9/2/22  Complete surgical excision of the left atrial appendage.  No evidence of intracardiac thrombus.  Status post surgical myectomy of the left ventricular septum.  No significant coronary artery disease.         Medications  Allergies   Current Outpatient Medications   Medication Sig Dispense Refill    aspirin (ASA) 81 MG EC tablet Take 1 tablet (81 mg) by mouth daily 90 tablet 3    Biotin 10 MG CAPS Take 1 capsule by mouth daily      Cetaphil Moisturizing (CETAPHIL) external lotion Apply 1 Application topically daily      cycloSPORINE (RESTASIS) 0.05 % ophthalmic emulsion Place 1 drop into both eyes      furosemide (LASIX) 20 MG tablet Take 2 tablets (40 mg) by mouth daily as needed (fluid retention) 20 tablet 11    metoprolol succinate ER (TOPROL XL) 50 MG 24 hr tablet TAKE 1 TABLET(50 MG) BY MOUTH TWICE DAILY 180 tablet 3    multivitamin (CENTRUM SILVER) tablet Take 2 tablets by mouth daily      Omega-3 Fatty Acids (RA FISH OIL) 1000 MG CAPS 2 capsules in the morning and 1 capsule at night      sodium chloride (OCEAN) 0.65 % nasal spray 1 spray daily as needed      SUMAtriptan (IMITREX) 50 MG tablet Take 1 tablet (50 mg) by mouth as needed for migraine 10 tablet 2    Turmeric Curcumin 500 MG CAPS Take 1 tablet by mouth daily        Allergies    Allergen Reactions    Shellfish Allergy Shortness Of Breath    Midazolam Hives     Welts    Acetaminophen     Dust Mites Other (See Comments)     Stuffy nose    Molds & Smuts      Other reaction(s): Wheezing  [place] blue grass,dust    Adhesive Tape Rash        Physical Examination Review of Systems   Vitals: /88 (BP Location: Right arm, Patient Position: Sitting, Cuff Size: Adult Regular)   Pulse 62   Resp 18   Wt 88.5 kg (195 lb)   SpO2 98%   BMI 34.54 kg/m    BMI= Body mass index is 34.54 kg/m .  Wt Readings from Last 3 Encounters:   04/26/23 88.5 kg (195 lb)   01/12/23 89.4 kg (197 lb)   07/22/22 88 kg (193 lb 14.4 oz)       General Appearance:   Pleasant female, appears stated age. no acute distress, overweight body habitus   ENT/Mouth: membranes moist, no apparent gingival bleeding.      EYES:  no scleral icterus, normal conjunctivae   Neck: supple   Respiratory:   lungs are clear to auscultation, no rales or wheezing, equal chest wall expansion    Cardiovascular:   Regular rhythm, normal rate. Normal first and second heart sounds with 2/6 systolic murmur at upper sternal border. no rubs or gallops; Jugular venous pressure normal, no edema bilaterally    Extremities: no cyanosis or clubbing   Skin: no xanthelasma, warm.    Heme/lymph/ Immunology No apparent bleeding noted.   Neurologic: Alert and oriented. normal gait, no tremors   Psychiatric: Pleasant, calm, appropriate affect.         Please refer above for cardiac ROS details.       Past History   Past Medical History:   Past Medical History:   Diagnosis Date    Hypertension     Hypertrophic cardiomyopathy (H) 01/15/2021    Severe outflow tract obstruction Preserved LV systolic performance Basal septum: 20 mm Gadolinium enhancement positive: Basal septum No major positive and no other minor positive risk factors identified.    Obese     Paroxysmal atrial fibrillation (H) 01/15/2021    Dx Nov 2020 (NAIF) [id number] score = 3 Rx apixaban     Parsonage-Mcknight syndrome         Past Surgical History:   Past Surgical History:   Procedure Laterality Date    COLONOSCOPY N/A 1/26/2023    Procedure: COLONOSCOPY with polypectomy;  Surgeon: Kwadwo Kline MD;  Location: [facility] Main OR    EP LOOP RECORDER IMPLANT N/A 1/29/2021    Procedure: EP Loop Recorder Insertion;  Surgeon: Paul Garza MD;  Location: [facility] Cardiac Cath Lab;  Service: Cardiology    EXCISE BREAST CYST/FIBROADENOMA/TUMOR/DUCT LESION/NIPPLE LESION/AREOLAR LESION      Description: Breast Surgery Lumpectomy;  Recorded: 03/07/2012;  Comments: adenoma    HC EXCISION NASAL POLYP(S), EXTENSIVE      Description: Extensive Excision Of Nasal Polyps;  Recorded: 03/07/2012;    HC KNEE SCOPE, DIAGNOSTIC      Description: Arthroscopy Knee Left;  Recorded: 03/07/2012;    MI EXCIS TENDON SHEATH LESION, HAND/FINGER      Description: Hand Excision Of A Tendon Cyst;  Recorded: 03/07/2012;    US GUIDED NEEDLE PLACEMENT  7/20/2020    ZZC REMOVAL OF OVARY(S)      Description: Oophorectomy;  Recorded: 06/26/2013;        Family History:   Family History   Problem Relation Age of Onset    Pacemaker Father     Coronary Artery Disease Father 65.00    Colon Cancer Father         XRT and chemo    Heart Failure Father 79.00    Cerebrovascular Disease Mother 89.00    Hypertension Mother     Endometrial Cancer Mother     Other - See Comments Mother         Scarlet fever vs rheumatic fever    Other - See Comments Cousin 16.00        Drown, Hypertrophic (apparently via father - not realted)    Cardiomyopathy Cousin         Hypertrophic (apparently via father - not realted)    Cardiomyopathy Child         Hypertrophic (apparently via father - not realted)    Sudden Death No family hx of         Social History:   Social History     Socioeconomic History    Marital status:      Spouse name: Not on file    Number of children: Not on file    Years of education: Not on file    Highest education level: Not on  file   Occupational History    Not on file   Tobacco Use    Smoking status: Never    Smokeless tobacco: Never   Vaping Use    Vaping status: Never Used   Substance and Sexual Activity    Alcohol use: Never    Drug use: Never    Sexual activity: Not on file   Other Topics Concern    Not on file   Social History Narrative    Not on file     Social Determinants of Health     Financial Resource Strain: Not on file   Food Insecurity: Not on file   Transportation Needs: Not on file   Physical Activity: Not on file   Stress: Not on file   Social Connections: Not on file   Intimate Partner Violence: Not on file   Housing Stability: Not on file            Lab Results    Chemistry/lipid CBC Cardiac Enzymes/BNP/TSH/INR   Lab Results   Component Value Date    CHOL 210 (H) 01/12/2023    HDL 64 01/12/2023    TRIG 106 01/12/2023    BUN 17.9 01/12/2023     (H) 01/12/2023    CO2 26 01/12/2023    Lab Results   Component Value Date    WBC 8.8 01/12/2023    HGB 14.8 01/12/2023    HCT 45.2 01/12/2023    MCV 87 01/12/2023     01/12/2023    Lab Results   Component Value Date    TROPONINI 0.03 07/23/2021     (H) 07/23/2021    TSH 2.48 05/27/2020                Thank you for allowing me to participate in the care of your patient.      Sincerely,     Hossein Arnold MD     [facility]  cc:   Hossein Arnold MD  [address], [address]  [place] [address]

## 2023-04-26 NOTE — PATIENT INSTRUCTIONS
It was a pleasure to meet with you today.      Below is a summary of your visit.   Continue your medications without changes.   You can continue to use furosemide as needed for water retention  Continue regular exercise  Decrease sweets in your diet. A little is okay, but moderation is key.  Follow up with me in 6 months with an echo prior.    We will call you to inform you of your test or procedure results within 3 business days of the test being performed.  If you do not hear from our office with the test results within 1 week please do not hesitate to call asking for these results.     Please do not hesitate to call the [facility] with any questions or concerns at [phone].     Sincerely,

## 2023-06-13 ENCOUNTER — TELEPHONE (OUTPATIENT)
Dept: CARDIOLOGY | Facility: CLINIC | Age: 73
End: 2023-06-13
Payer: COMMERCIAL

## 2023-06-13 NOTE — TELEPHONE ENCOUNTER
Pt called in, was seen by allergist., Dr. Manrique from allergy and asthma clinic. Recommended albuteral. trelegy and dupixen for asthma. Pt requested approval from Dr. Arnold to utilize these inhalers. Advised would forward request, provider is currently out of the office but will follow up with outcome. Pt expressed understanding-JOLEEN

## 2023-06-19 NOTE — TELEPHONE ENCOUNTER
Spoke with Pt regarding response with Dr. Arnold, repeat echo ordered with follow up thereafter. provided scheduling contact number-JOLEEN

## 2023-06-28 ENCOUNTER — TELEPHONE (OUTPATIENT)
Dept: CARDIOLOGY | Facility: CLINIC | Age: 73
End: 2023-06-28
Payer: COMMERCIAL

## 2023-06-28 NOTE — TELEPHONE ENCOUNTER
Returning vmm to Pt, discussed recommendations from ENT, Dr. Lomeli ordering steroid for cyst on neck and polyps in nose concerned of gaining weight. Pt indicated will further discuss with Dr. Lomeli any alternatives as did not want to gain weight and thus add any further stress on her heart. Advised the prednisone is short term (2 weeks per pt report) and if decided to initiate medication to not stop abruptly. Pt expressed understanding.-JOLEEN

## 2023-07-03 ENCOUNTER — ANCILLARY ORDERS (OUTPATIENT)
Dept: ULTRASOUND IMAGING | Facility: CLINIC | Age: 73
End: 2023-07-03

## 2023-07-07 ENCOUNTER — ANCILLARY PROCEDURE (OUTPATIENT)
Dept: CARDIOLOGY | Facility: CLINIC | Age: 73
End: 2023-07-07
Attending: INTERNAL MEDICINE
Payer: COMMERCIAL

## 2023-07-07 DIAGNOSIS — Z98.890 HISTORY OF LOOP RECORDER: ICD-10-CM

## 2023-07-07 DIAGNOSIS — I42.9 CARDIOMYOPATHY (H): ICD-10-CM

## 2023-07-07 PROCEDURE — G2066 INTER DEVC REMOTE 30D: HCPCS | Performed by: INTERNAL MEDICINE

## 2023-07-07 PROCEDURE — 93297 REM INTERROG DEV EVAL ICPMS: CPT | Performed by: INTERNAL MEDICINE

## 2023-07-13 LAB
MDC_IDC_EPISODE_DTM: NORMAL
MDC_IDC_EPISODE_DURATION: 10.26 S
MDC_IDC_EPISODE_DURATION: 10.45 S
MDC_IDC_EPISODE_DURATION: 10.51 S
MDC_IDC_EPISODE_DURATION: 11.46 S
MDC_IDC_EPISODE_DURATION: 18.04 S
MDC_IDC_EPISODE_DURATION: 3.12 S
MDC_IDC_EPISODE_DURATION: 3.19 S
MDC_IDC_EPISODE_DURATION: 3.48 S
MDC_IDC_EPISODE_DURATION: 3.54 S
MDC_IDC_EPISODE_DURATION: 3.6 S
MDC_IDC_EPISODE_DURATION: 3.9 S
MDC_IDC_EPISODE_DURATION: 3.91 S
MDC_IDC_EPISODE_DURATION: 4.14 S
MDC_IDC_EPISODE_DURATION: 4.34 S
MDC_IDC_EPISODE_DURATION: 4.6 S
MDC_IDC_EPISODE_DURATION: 4.77 S
MDC_IDC_EPISODE_DURATION: 5.11 S
MDC_IDC_EPISODE_DURATION: 5.18 S
MDC_IDC_EPISODE_DURATION: 5.48 S
MDC_IDC_EPISODE_DURATION: 5.88 S
MDC_IDC_EPISODE_DURATION: 5.89 S
MDC_IDC_EPISODE_DURATION: 6.03 S
MDC_IDC_EPISODE_DURATION: 6.09 S
MDC_IDC_EPISODE_DURATION: 6.23 S
MDC_IDC_EPISODE_DURATION: 6.49 S
MDC_IDC_EPISODE_DURATION: 6.5 S
MDC_IDC_EPISODE_DURATION: 6.59 S
MDC_IDC_EPISODE_DURATION: 8.65 S
MDC_IDC_EPISODE_ID: 384
MDC_IDC_EPISODE_ID: 385
MDC_IDC_EPISODE_ID: 386
MDC_IDC_EPISODE_ID: 387
MDC_IDC_EPISODE_ID: 388
MDC_IDC_EPISODE_ID: 389
MDC_IDC_EPISODE_ID: 390
MDC_IDC_EPISODE_ID: 391
MDC_IDC_EPISODE_ID: 392
MDC_IDC_EPISODE_ID: 393
MDC_IDC_EPISODE_ID: 394
MDC_IDC_EPISODE_ID: 395
MDC_IDC_EPISODE_ID: 396
MDC_IDC_EPISODE_ID: 397
MDC_IDC_EPISODE_ID: 398
MDC_IDC_EPISODE_ID: 399
MDC_IDC_EPISODE_ID: 400
MDC_IDC_EPISODE_ID: 401
MDC_IDC_EPISODE_ID: 402
MDC_IDC_EPISODE_ID: 403
MDC_IDC_EPISODE_ID: 404
MDC_IDC_EPISODE_ID: 405
MDC_IDC_EPISODE_ID: 406
MDC_IDC_EPISODE_ID: 407
MDC_IDC_EPISODE_ID: 408
MDC_IDC_EPISODE_ID: 409
MDC_IDC_EPISODE_ID: 410
MDC_IDC_EPISODE_ID: 411
MDC_IDC_EPISODE_ID: 412
MDC_IDC_EPISODE_ID: 413
MDC_IDC_EPISODE_TYPE: NORMAL
MDC_IDC_MSMT_BATTERY_STATUS: NORMAL
MDC_IDC_PG_IMPLANT_DTM: NORMAL
MDC_IDC_PG_MFG: NORMAL
MDC_IDC_PG_MODEL: NORMAL
MDC_IDC_PG_SERIAL: NORMAL
MDC_IDC_PG_TYPE: NORMAL
MDC_IDC_SESS_CLINIC_NAME: NORMAL
MDC_IDC_SESS_DTM: NORMAL
MDC_IDC_SESS_TYPE: NORMAL
MDC_IDC_SET_ZONE_DETECTION_INTERVAL: 2000 MS
MDC_IDC_SET_ZONE_DETECTION_INTERVAL: 3000 MS
MDC_IDC_SET_ZONE_DETECTION_INTERVAL: 370 MS
MDC_IDC_SET_ZONE_TYPE: NORMAL
MDC_IDC_STAT_AT_BURDEN_PERCENT: 0 %
MDC_IDC_STAT_AT_DTM_END: NORMAL
MDC_IDC_STAT_AT_DTM_START: NORMAL
MDC_IDC_STAT_EPISODE_RECENT_COUNT: 0
MDC_IDC_STAT_EPISODE_RECENT_COUNT: 1
MDC_IDC_STAT_EPISODE_RECENT_COUNT: 42
MDC_IDC_STAT_EPISODE_RECENT_COUNT_DTM_END: NORMAL
MDC_IDC_STAT_EPISODE_RECENT_COUNT_DTM_START: NORMAL
MDC_IDC_STAT_EPISODE_TOTAL_COUNT: 0
MDC_IDC_STAT_EPISODE_TOTAL_COUNT: 2
MDC_IDC_STAT_EPISODE_TOTAL_COUNT: 33
MDC_IDC_STAT_EPISODE_TOTAL_COUNT: 378
MDC_IDC_STAT_EPISODE_TOTAL_COUNT_DTM_END: NORMAL
MDC_IDC_STAT_EPISODE_TOTAL_COUNT_DTM_START: NORMAL
MDC_IDC_STAT_EPISODE_TYPE: NORMAL

## 2023-07-24 ENCOUNTER — TRANSFERRED RECORDS (OUTPATIENT)
Dept: HEALTH INFORMATION MANAGEMENT | Facility: CLINIC | Age: 73
End: 2023-07-24

## 2023-07-26 ENCOUNTER — TRANSFERRED RECORDS (OUTPATIENT)
Dept: HEALTH INFORMATION MANAGEMENT | Facility: CLINIC | Age: 73
End: 2023-07-26
Payer: COMMERCIAL

## 2023-07-27 ENCOUNTER — ANCILLARY ORDERS (OUTPATIENT)
Dept: ULTRASOUND IMAGING | Facility: CLINIC | Age: 73
End: 2023-07-27

## 2023-07-27 DIAGNOSIS — R22.1 LOCALIZED SWELLING, MASS AND LUMP, NECK: Primary | ICD-10-CM

## 2023-08-03 ENCOUNTER — ANCILLARY PROCEDURE (OUTPATIENT)
Dept: ULTRASOUND IMAGING | Facility: CLINIC | Age: 73
End: 2023-08-03
Attending: SPECIALIST
Payer: COMMERCIAL

## 2023-08-03 DIAGNOSIS — R22.1 LOCALIZED SWELLING, MASS AND LUMP, NECK: ICD-10-CM

## 2023-08-03 PROCEDURE — 76536 US EXAM OF HEAD AND NECK: CPT

## 2023-08-04 ENCOUNTER — ANCILLARY ORDERS (OUTPATIENT)
Dept: CT IMAGING | Facility: HOSPITAL | Age: 73
End: 2023-08-04

## 2023-08-04 DIAGNOSIS — R22.1 LOCALIZED SWELLING, MASS AND LUMP, NECK: Primary | ICD-10-CM

## 2023-08-12 ENCOUNTER — HOSPITAL ENCOUNTER (OUTPATIENT)
Dept: CT IMAGING | Facility: CLINIC | Age: 73
Discharge: HOME | End: 2023-08-12
Attending: SPECIALIST | Admitting: SPECIALIST
Payer: COMMERCIAL

## 2023-08-12 DIAGNOSIS — R22.1 LOCALIZED SWELLING, MASS AND LUMP, NECK: ICD-10-CM

## 2023-08-12 LAB
CREAT BLD-MCNC: 0.9 MG/DL (ref 0.6–1.1)
GFR SERPL CREATININE-BSD FRML MDRD: >60 ML/MIN/1.73M2

## 2023-08-12 PROCEDURE — 70491 CT SOFT TISSUE NECK W/DYE: CPT

## 2023-08-12 PROCEDURE — 250N000011 HC RX IP 250 OP 636: Mod: JZ | Performed by: SPECIALIST

## 2023-08-12 PROCEDURE — 82565 ASSAY OF CREATININE: CPT

## 2023-08-12 RX ORDER — IOPAMIDOL 755 MG/ML
100 INJECTION, SOLUTION INTRAVASCULAR ONCE
Status: COMPLETED | OUTPATIENT
Start: 2023-08-12 | End: 2023-08-12

## 2023-08-12 RX ADMIN — IOPAMIDOL 100 ML: 755 INJECTION, SOLUTION INTRAVENOUS at 14:57

## 2023-08-22 ENCOUNTER — MEDICAL CORRESPONDENCE (OUTPATIENT)
Dept: HEALTH INFORMATION MANAGEMENT | Facility: CLINIC | Age: 73
End: 2023-08-22
Payer: COMMERCIAL

## 2023-08-22 ENCOUNTER — TRANSCRIBE ORDERS (OUTPATIENT)
Dept: OTHER | Age: 73
End: 2023-08-22

## 2023-08-22 DIAGNOSIS — R22.9 LOCALIZED SUPERFICIAL SWELLING, MASS, OR LUMP: Primary | ICD-10-CM

## 2023-08-24 NOTE — TELEPHONE ENCOUNTER
FUTURE VISIT INFORMATION:      FUTURE VISIT INFORMATION:  Date: 9/20/23  Time: 3:45 PM  Location: [facility]  REFERRAL INFORMATION:  Referring provider: Zane Lomeli MD  Referring providers clinic: [facility]  Reason for visit/diagnosis:  Localized superficial swelling, mass, or lump [R22.9], GERONIMO QUIROGA S  Enhancing mass pressent within the left carotid space at the carotid bifurcation interposed between the internal and external carotid arteries. Zane Lomeli MD in GENERIC EXTERNAL DATA DEPARTMENT     RECORDS REQUESTED FROM:       Clinic name Comments Records Status Imaging Status   [facility] Zane Lomeli MD    7/24/2023 CT maxillofacial sinus  FedEx: [id number]    *RECEIVED RECS 9/18 and send to scanning  6/27/23 and 7/24/23 OV notes  7/24/23 CT maxillofacial imaging report Req 8/24/23, 9/5/23      -RECEIVED Pending    9/20/23- received [facility] CT neck 8/12/23  US head neck 8/3/23  US Guided 7/20/20  US head neck 5/27/20 [facility]  [address] [place] [address]  Phone [phone]  Fax [phone] 7/20/20 Case: [id number] Specimen: Submandibular, Mass, left submandibular gland     FedEX: [id number] Epic    Path req 9/12/23    Path received 9/13/23    [facility] 7/9/20 note- Blanca Shipley MD [facility]       August 24, 2023 at 7:53 AM - request for recs at [facility]  September 5, 2023 at 8:08 AM - Called facility and spoke to Villegas if request was received previously send on 8/24. Per rep, they are behind on request but did received it. Request is currently in processing -Sofiya  September 12, 2023 at 6:41 AM - req for path slides -Sofiya  9/13/23 3:29PM slides received - [facility]   September 15, 2023 at 8:49 AM - Called [facility] and spoke to Ilda follow up on recs request, Ilda will send records over, patient did also have a CT done in house on 7/24/23 for maxillofacial sinus. Send fedex  label for imaging disc -Sofiya  September 18, 2023 at 11:41 AM - Called [facility] and spoke to Ilda no recs received and looking for status of imaging disc. Ilda states recs were sent to [phone], correct fax number given and she will resend records to fax [phone], not able to speak to nurse if imaging disc were sent out yet but will give me a call back to let me know. CB number given -Sofiya  *UPDATE 12:08 PM - Received recs from [facility] and send to scanning -Sofiya  9/20/23- received imaging disc from [facility] and took it Naty to upload into pacs - Milena

## 2023-09-15 ENCOUNTER — LAB REQUISITION (OUTPATIENT)
Dept: LAB | Facility: CLINIC | Age: 73
End: 2023-09-15
Payer: COMMERCIAL

## 2023-09-15 LAB
PATH REPORT.COMMENTS IMP SPEC: NORMAL
PATH REPORT.FINAL DX SPEC: NORMAL
PATH REPORT.GROSS SPEC: NORMAL
PATH REPORT.MICROSCOPIC SPEC OTHER STN: NORMAL
PATH REPORT.RELEVANT HX SPEC: NORMAL
PATH REPORT.RELEVANT HX SPEC: NORMAL
PATH REPORT.SITE OF ORIGIN SPEC: NORMAL

## 2023-09-20 ENCOUNTER — OFFICE VISIT (OUTPATIENT)
Dept: OTOLARYNGOLOGY | Facility: CLINIC | Age: 73
End: 2023-09-20
Attending: SPECIALIST
Payer: COMMERCIAL

## 2023-09-20 ENCOUNTER — PRE VISIT (OUTPATIENT)
Dept: OTOLARYNGOLOGY | Facility: CLINIC | Age: 73
End: 2023-09-20

## 2023-09-20 ENCOUNTER — LAB (OUTPATIENT)
Dept: LAB | Facility: CLINIC | Age: 73
End: 2023-09-20
Payer: COMMERCIAL

## 2023-09-20 VITALS
DIASTOLIC BLOOD PRESSURE: 83 MMHG | HEIGHT: 63 IN | HEART RATE: 56 BPM | SYSTOLIC BLOOD PRESSURE: 134 MMHG | OXYGEN SATURATION: 96 % | WEIGHT: 194 LBS | BODY MASS INDEX: 34.38 KG/M2

## 2023-09-20 DIAGNOSIS — R22.1 NECK MASS: ICD-10-CM

## 2023-09-20 DIAGNOSIS — R22.9 LOCALIZED SUPERFICIAL SWELLING, MASS, OR LUMP: ICD-10-CM

## 2023-09-20 DIAGNOSIS — R22.1 NECK MASS: Primary | ICD-10-CM

## 2023-09-20 PROCEDURE — 99000 SPECIMEN HANDLING OFFICE-LAB: CPT | Performed by: PATHOLOGY

## 2023-09-20 PROCEDURE — 83835 ASSAY OF METANEPHRINES: CPT | Mod: 90 | Performed by: PATHOLOGY

## 2023-09-20 PROCEDURE — 31575 DIAGNOSTIC LARYNGOSCOPY: CPT | Mod: GC | Performed by: OTOLARYNGOLOGY

## 2023-09-20 PROCEDURE — 36415 COLL VENOUS BLD VENIPUNCTURE: CPT | Performed by: PATHOLOGY

## 2023-09-20 PROCEDURE — 99204 OFFICE O/P NEW MOD 45 MIN: CPT | Mod: 25 | Performed by: OTOLARYNGOLOGY

## 2023-09-20 ASSESSMENT — PAIN SCALES - GENERAL: PAINLEVEL: NO PAIN (0)

## 2023-09-20 NOTE — LETTER
9/20/2023       RE: Romy Izquierdo  [address]  [place] [address]     Dear Colleague,    Thank you for referring your patient, Romy Izquierdo, to the [facility] E[facility] NO[facility] AND [facility] [place] [facility]. Please see a copy of my visit note below.    Otolaryngology Head and Neck Surgery Clinic Consult Note  September 20, 2023    CC: left neck mass    HISTORY OF PRESENT ILLNESS:  Ms. 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  She was previously being followed by an outside ENT for left submandibular swelling, with prior biopsy showing benign salivary tissue.  She had a CT scan which demonstrated an incidental tumor of the left carotid bifurcation.  She notes fluctuating voice hoarseness as well as occasional choking with liquids and dry foods.  She has also been having episodic hot flashes and excessive sweating. She denies heart palpitations or racing s/p her cardiac surgery and a-fib ablation.    MEDICATIONS:     Current Outpatient Medications   Medication Sig Dispense Refill    aspirin (ASA) 81 MG EC tablet Take 1 tablet (81 mg) by mouth daily 90 tablet 3    Biotin 10 MG CAPS Take 1 capsule by mouth daily      Cetaphil Moisturizing (CETAPHIL) external lotion Apply 1 Application topically daily      cycloSPORINE (RESTASIS) 0.05 % ophthalmic emulsion Place 1 drop into both eyes      furosemide (LASIX) 20 MG tablet Take 2 tablets (40 mg) by mouth daily as needed (fluid retention) 20 tablet 11    metoprolol succinate ER (TOPROL XL) 50 MG 24 hr tablet TAKE 1 TABLET(50 MG) BY MOUTH TWICE DAILY 180 tablet 3    multivitamin (CENTRUM SILVER) tablet Take 2 tablets by mouth daily      Omega-3 Fatty Acids (RA FISH OIL)  1000 MG CAPS 2 capsules in the morning and 1 capsule at night      sodium chloride (OCEAN) 0.65 % nasal spray 1 spray daily as needed      SUMAtriptan (IMITREX) 50 MG tablet Take 1 tablet (50 mg) by mouth as needed for migraine 10 tablet 2    Turmeric Curcumin 500 MG CAPS Take 1 tablet by mouth daily      dupilumab (DUPIXENT) 300 MG/2ML prefilled syringe Inject 300 mg Subcutaneous once         ALLERGIES:    Allergies   Allergen Reactions    Shellfish Allergy Shortness Of Breath    Midazolam Hives     Welts    Acetaminophen     Dust Mites Other (See Comments)     Stuffy nose    Molds & Smuts      Other reaction(s): Wheezing  [place] blue grass,dust    Adhesive Tape Rash       HABITS/SOCIAL HISTORY:    Social History     Tobacco Use    Smoking status: Never    Smokeless tobacco: Never   Vaping Use    Vaping Use: Never used   Substance Use Topics    Alcohol use: Never    Drug use: Never     PAST MEDICAL HISTORY:   Past Medical History:   Diagnosis Date    Hypertension     Hypertrophic cardiomyopathy (H) 01/15/2021    Severe outflow tract obstruction Preserved LV systolic performance Basal septum: 20 mm Gadolinium enhancement positive: Basal septum No major positive and no other minor positive risk factors identified.    Obese     Paroxysmal atrial fibrillation (H) 01/15/2021    Dx Nov 2020 (NAIF) [id number] score = 3 Rx apixaban    Parsonage-Mcknight syndrome         FAMILY HISTORY:    Family History   Problem Relation Age of Onset    Pacemaker Father     Coronary Artery Disease Father 65.00    Colon Cancer Father         XRT and chemo    Heart Failure Father 79.00    Cerebrovascular Disease Mother 89.00    Hypertension Mother     Endometrial Cancer Mother     Other - See Comments Mother         Scarlet fever vs rheumatic fever    Other - See Comments Cousin 16.00        Drown, Hypertrophic (apparently via father - not realted)    Cardiomyopathy Cousin         Hypertrophic (apparently via father - not realted)     Cardiomyopathy Child         Hypertrophic (apparently via father - not realted)    Sudden Death No family hx of        REVIEW OF SYSTEMS:    A 10 point Review of Systems was performed and pertinent positives are noted in the HPI.    EXAM:  General - NAD, well-groomed.  Head/Face - Normocephalic, atraumatic. No lesions or scars. Facial nerve function intact and symmetric.  Oral cavity - Normal tongue, floor of mouth, buccal mucosa, and palate.  No lesions or masses on inspection or palpation. Palate elevation symmetric, tongue protrusion symmetric.  Oropharynx - Normal mucosa. Palate symmetric with normal elevation. No lesions or masses on inspection.  Neck - Supple with normal laryngeal and tracheal landmarks.  The parotid beds were without masses.  No palpable thyroid nodules or cervical lymphadenopathy.  Normal range of motion.  Respiratory - Breathing comfortably on room air, no stridor, stertor, or exertion of accessory muscles. Voice strong.    Fiberoptic Endoscopy:  Consent for fiberoptic laryngoscopy was obtained, and we confirmed correctness of procedure and identity of patient. Fiberoptic laryngoscopy was indicated due to hoarseness. The nose was topically decongested and anesthetized.  The fiberoptic laryngoscope was passed under endoscopic vision. The turbinates were normal. The inferior and middle meati were clear bilaterally without purulence, masses, or polyps. The nasopharynx was clear. The Eustachian tubes were clear. The soft palate appeared normal with good mobility. The epiglottis was sharp and the visualized portion of the vallecula was clear.  The larynx was clear with mobile cords. The arytenoids were clear and there was no pooling in the hypopharynx.      IMAGING:   Neck CT 8/12/23 - independently reviewed    PATHOLOGY: None reviewed.    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 We will get labs to determine whether this is a secreting tumor, as well as an MRI and then discuss whether or not removal is necessary.    - MRI  - Urine and plasma metanephrines      -- Patient and above plan discussed with Dr. Orr.    Betina Medina MD  Otolaryngology-Head & Neck Surgery PGY3      Attestation signed by Dawson Orr MD at 9/26/2023 10:45 PM:  I, Dawson Orr MD, saw this patient with the resident/fellow and agree with the resident's findings and plan of care as documented in the resident's/fellow's note. I was present with the patient for the entire viewing portion of the endoscopy procedure (including scope insertion and withdrawal) and agree with the interpretation and report as documented by the resident.      Again, thank you for allowing me to participate in the care of your patient.      Sincerely,    Dawson Orr MD

## 2023-09-20 NOTE — PROGRESS NOTES
Otolaryngology Head and Neck Surgery Clinic Consult Note  September 20, 2023    CC: left neck mass    HISTORY OF PRESENT ILLNESS:  Ms. 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  She was previously being followed by an outside ENT for left submandibular swelling, with prior biopsy showing benign salivary tissue.  She had a CT scan which demonstrated an incidental tumor of the left carotid bifurcation.  She notes fluctuating voice hoarseness as well as occasional choking with liquids and dry foods.  She has also been having episodic hot flashes and excessive sweating. She denies heart palpitations or racing s/p her cardiac surgery and a-fib ablation.    MEDICATIONS:     Current Outpatient Medications   Medication Sig Dispense Refill    aspirin (ASA) 81 MG EC tablet Take 1 tablet (81 mg) by mouth daily 90 tablet 3    Biotin 10 MG CAPS Take 1 capsule by mouth daily      Cetaphil Moisturizing (CETAPHIL) external lotion Apply 1 Application topically daily      cycloSPORINE (RESTASIS) 0.05 % ophthalmic emulsion Place 1 drop into both eyes      furosemide (LASIX) 20 MG tablet Take 2 tablets (40 mg) by mouth daily as needed (fluid retention) 20 tablet 11    metoprolol succinate ER (TOPROL XL) 50 MG 24 hr tablet TAKE 1 TABLET(50 MG) BY MOUTH TWICE DAILY 180 tablet 3    multivitamin (CENTRUM SILVER) tablet Take 2 tablets by mouth daily      Omega-3 Fatty Acids (RA FISH OIL) 1000 MG CAPS 2 capsules in the morning and 1 capsule at night      sodium chloride (OCEAN) 0.65 % nasal spray 1 spray daily as needed      SUMAtriptan (IMITREX) 50 MG tablet Take 1 tablet (50 mg) by mouth as needed for migraine 10 tablet 2    Turmeric Curcumin 500 MG CAPS Take 1 tablet by mouth daily      dupilumab (DUPIXENT) 300 MG/2ML  prefilled syringe Inject 300 mg Subcutaneous once         ALLERGIES:    Allergies   Allergen Reactions    Shellfish Allergy Shortness Of Breath    Midazolam Hives     Welts    Acetaminophen     Dust Mites Other (See Comments)     Stuffy nose    Molds & Smuts      Other reaction(s): Wheezing  [place] blue grass,dust    Adhesive Tape Rash       HABITS/SOCIAL HISTORY:    Social History     Tobacco Use    Smoking status: Never    Smokeless tobacco: Never   Vaping Use    Vaping Use: Never used   Substance Use Topics    Alcohol use: Never    Drug use: Never     PAST MEDICAL HISTORY:   Past Medical History:   Diagnosis Date    Hypertension     Hypertrophic cardiomyopathy (H) 01/15/2021    Severe outflow tract obstruction Preserved LV systolic performance Basal septum: 20 mm Gadolinium enhancement positive: Basal septum No major positive and no other minor positive risk factors identified.    Obese     Paroxysmal atrial fibrillation (H) 01/15/2021    Dx Nov 2020 (NAIF) [id number] score = 3 Rx apixaban    Parsonage-Mcknight syndrome         FAMILY HISTORY:    Family History   Problem Relation Age of Onset    Pacemaker Father     Coronary Artery Disease Father 65.00    Colon Cancer Father         XRT and chemo    Heart Failure Father 79.00    Cerebrovascular Disease Mother 89.00    Hypertension Mother     Endometrial Cancer Mother     Other - See Comments Mother         Scarlet fever vs rheumatic fever    Other - See Comments Cousin 16.00        Drown, Hypertrophic (apparently via father - not realted)    Cardiomyopathy Cousin         Hypertrophic (apparently via father - not realted)    Cardiomyopathy Child         Hypertrophic (apparently via father - not realted)    Sudden Death No family hx of        REVIEW OF SYSTEMS:    A 10 point Review of Systems was performed and pertinent positives are noted in the HPI.    EXAM:  General - NAD, well-groomed.  Head/Face - Normocephalic, atraumatic. No lesions or scars. Facial nerve  function intact and symmetric.  Oral cavity - Normal tongue, floor of mouth, buccal mucosa, and palate.  No lesions or masses on inspection or palpation. Palate elevation symmetric, tongue protrusion symmetric.  Oropharynx - Normal mucosa. Palate symmetric with normal elevation. No lesions or masses on inspection.  Neck - Supple with normal laryngeal and tracheal landmarks.  The parotid beds were without masses.  No palpable thyroid nodules or cervical lymphadenopathy.  Normal range of motion.  Respiratory - Breathing comfortably on room air, no stridor, stertor, or exertion of accessory muscles. Voice strong.    Fiberoptic Endoscopy:  Consent for fiberoptic laryngoscopy was obtained, and we confirmed correctness of procedure and identity of patient. Fiberoptic laryngoscopy was indicated due to hoarseness. The nose was topically decongested and anesthetized.  The fiberoptic laryngoscope was passed under endoscopic vision. The turbinates were normal. The inferior and middle meati were clear bilaterally without purulence, masses, or polyps. The nasopharynx was clear. The Eustachian tubes were clear. The soft palate appeared normal with good mobility. The epiglottis was sharp and the visualized portion of the vallecula was clear.  The larynx was clear with mobile cords. The arytenoids were clear and there was no pooling in the hypopharynx.      IMAGING:   Neck CT 8/12/23 - independently reviewed    PATHOLOGY: None reviewed.    ASSESSMENT AND PLAN: Romy Izquierdo is a 73 year old female with a past medical history of hypertrophic cardiomyopathy on 81mg asa who is seen in consultation for a new right neck mass with imaging consistent with paraganglioma. We will get labs to determine whether this is a secreting tumor, as well as an MRI and then discuss whether or not removal is necessary.    - MRI  - Urine and plasma metanephrines      -- Patient and above plan discussed with Dr. Orr.    Betina Medina,  MD  Otolaryngology-Head & Neck Surgery PGY3

## 2023-09-20 NOTE — PATIENT INSTRUCTIONS
1. Please schedule MRI scan and complete your lab work today.   2. Please call the ENT clinic with any questions,concerns, new or worsening symptoms.    -Clinic number is [phone]   - 's direct line (Dr. Orr's nurse) [phone]

## 2023-09-25 LAB
ANNOTATION COMMENT IMP: ABNORMAL
METANEPHS SERPL-SCNC: 0.13 NMOL/L
NORMETANEPHRINE SERPL-SCNC: 0.99 NMOL/L

## 2023-09-29 ENCOUNTER — APPOINTMENT (OUTPATIENT)
Dept: LAB | Facility: CLINIC | Age: 73
End: 2023-09-29
Payer: COMMERCIAL

## 2023-09-29 PROCEDURE — 83835 ASSAY OF METANEPHRINES: CPT | Mod: 90

## 2023-09-29 PROCEDURE — 99000 SPECIMEN HANDLING OFFICE-LAB: CPT

## 2023-10-03 ENCOUNTER — HOSPITAL ENCOUNTER (OUTPATIENT)
Dept: CARDIOLOGY | Facility: CLINIC | Age: 73
Discharge: HOME | End: 2023-10-03
Attending: INTERNAL MEDICINE | Admitting: INTERNAL MEDICINE
Payer: COMMERCIAL

## 2023-10-03 DIAGNOSIS — I42.2 HYPERTROPHIC CARDIOMYOPATHY (H): ICD-10-CM

## 2023-10-03 DIAGNOSIS — I50.32 CHRONIC DIASTOLIC CONGESTIVE HEART FAILURE (H): ICD-10-CM

## 2023-10-03 LAB
CREAT 24H UR-MRATE: 1120 MG/D
CREAT UR-MCNC: 80 MG/DL
LVEF ECHO: NORMAL
METANEPH 24H UR-MCNC: 54 UG/L
METANEPH 24H UR-MRATE: 76 UG/D
METANEPH+NORMETANEPH UR-IMP: ABNORMAL
METANEPH/CREAT 24H UR: 68 UG/G CRT
NORMETANEPHRINE 24H UR-MCNC: 343 UG/L
NORMETANEPHRINE 24H UR-MRATE: 480 UG/D
NORMETANEPHRINE/CREAT 24H UR: 429 UG/G CRT

## 2023-10-03 PROCEDURE — 93306 TTE W/DOPPLER COMPLETE: CPT | Mod: 26 | Performed by: INTERNAL MEDICINE

## 2023-10-03 PROCEDURE — 93306 TTE W/DOPPLER COMPLETE: CPT

## 2023-10-10 ENCOUNTER — ANCILLARY PROCEDURE (OUTPATIENT)
Dept: CARDIOLOGY | Facility: CLINIC | Age: 73
End: 2023-10-10
Attending: INTERNAL MEDICINE
Payer: COMMERCIAL

## 2023-10-10 DIAGNOSIS — Z98.890 HISTORY OF LOOP RECORDER: ICD-10-CM

## 2023-10-10 DIAGNOSIS — I42.9 CARDIOMYOPATHY (H): ICD-10-CM

## 2023-10-10 LAB
MDC_IDC_EPISODE_DTM: NORMAL
MDC_IDC_EPISODE_DURATION: 3.01 S
MDC_IDC_EPISODE_DURATION: 3.04 S
MDC_IDC_EPISODE_DURATION: 3.07 S
MDC_IDC_EPISODE_DURATION: 3.1 S
MDC_IDC_EPISODE_DURATION: 3.47 S
MDC_IDC_EPISODE_DURATION: 3.95 S
MDC_IDC_EPISODE_DURATION: 4.1 S
MDC_IDC_EPISODE_DURATION: 4.25 S
MDC_IDC_EPISODE_DURATION: 4.47 S
MDC_IDC_EPISODE_DURATION: 40.69 S
MDC_IDC_EPISODE_DURATION: 6.05 S
MDC_IDC_EPISODE_DURATION: 6.33 S
MDC_IDC_EPISODE_ID: 424
MDC_IDC_EPISODE_ID: 425
MDC_IDC_EPISODE_ID: 426
MDC_IDC_EPISODE_ID: 427
MDC_IDC_EPISODE_ID: 428
MDC_IDC_EPISODE_ID: 429
MDC_IDC_EPISODE_ID: 430
MDC_IDC_EPISODE_ID: 431
MDC_IDC_EPISODE_ID: 432
MDC_IDC_EPISODE_ID: 433
MDC_IDC_EPISODE_ID: 434
MDC_IDC_EPISODE_ID: 435
MDC_IDC_EPISODE_TYPE: NORMAL
MDC_IDC_MSMT_BATTERY_STATUS: NORMAL
MDC_IDC_PG_IMPLANT_DTM: NORMAL
MDC_IDC_PG_MFG: NORMAL
MDC_IDC_PG_MODEL: NORMAL
MDC_IDC_PG_SERIAL: NORMAL
MDC_IDC_PG_TYPE: NORMAL
MDC_IDC_SESS_CLINIC_NAME: NORMAL
MDC_IDC_SESS_DTM: NORMAL
MDC_IDC_SESS_TYPE: NORMAL
MDC_IDC_SET_ZONE_DETECTION_BEATS_DENOMINATOR: 16 {BEATS}
MDC_IDC_SET_ZONE_DETECTION_BEATS_DENOMINATOR: 4 {BEATS}
MDC_IDC_SET_ZONE_DETECTION_BEATS_NUMERATOR: 16 {BEATS}
MDC_IDC_SET_ZONE_DETECTION_BEATS_NUMERATOR: 4 {BEATS}
MDC_IDC_SET_ZONE_DETECTION_INTERVAL: 2000 MS
MDC_IDC_SET_ZONE_DETECTION_INTERVAL: 3000 MS
MDC_IDC_SET_ZONE_DETECTION_INTERVAL: 370 MS
MDC_IDC_SET_ZONE_STATUS: NORMAL
MDC_IDC_SET_ZONE_TYPE: NORMAL
MDC_IDC_SET_ZONE_VENDOR_TYPE: NORMAL
MDC_IDC_STAT_AT_BURDEN_PERCENT: 0 %
MDC_IDC_STAT_AT_DTM_END: NORMAL
MDC_IDC_STAT_AT_DTM_START: NORMAL
MDC_IDC_STAT_EPISODE_RECENT_COUNT: 0
MDC_IDC_STAT_EPISODE_RECENT_COUNT: 1
MDC_IDC_STAT_EPISODE_RECENT_COUNT: 21
MDC_IDC_STAT_EPISODE_RECENT_COUNT_DTM_END: NORMAL
MDC_IDC_STAT_EPISODE_RECENT_COUNT_DTM_START: NORMAL
MDC_IDC_STAT_EPISODE_TOTAL_COUNT: 0
MDC_IDC_STAT_EPISODE_TOTAL_COUNT: 3
MDC_IDC_STAT_EPISODE_TOTAL_COUNT: 33
MDC_IDC_STAT_EPISODE_TOTAL_COUNT: 399
MDC_IDC_STAT_EPISODE_TOTAL_COUNT_DTM_END: NORMAL
MDC_IDC_STAT_EPISODE_TOTAL_COUNT_DTM_START: NORMAL
MDC_IDC_STAT_EPISODE_TYPE: NORMAL

## 2023-10-10 PROCEDURE — 93297 REM INTERROG DEV EVAL ICPMS: CPT | Performed by: INTERNAL MEDICINE

## 2023-10-10 PROCEDURE — G2066 INTER DEVC REMOTE 30D: HCPCS | Performed by: INTERNAL MEDICINE

## 2023-10-19 ENCOUNTER — ANCILLARY PROCEDURE (OUTPATIENT)
Dept: MRI IMAGING | Facility: CLINIC | Age: 73
End: 2023-10-19
Attending: OTOLARYNGOLOGY
Payer: COMMERCIAL

## 2023-10-19 DIAGNOSIS — R22.1 NECK MASS: ICD-10-CM

## 2023-10-19 PROCEDURE — 70543 MRI ORBT/FAC/NCK W/O &W/DYE: CPT | Mod: GC | Performed by: RADIOLOGY

## 2023-10-19 PROCEDURE — A9585 GADOBUTROL INJECTION: HCPCS | Performed by: RADIOLOGY

## 2023-10-19 RX ORDER — GADOBUTROL 604.72 MG/ML
10 INJECTION INTRAVENOUS ONCE
Status: COMPLETED | OUTPATIENT
Start: 2023-10-19 | End: 2023-10-19

## 2023-10-19 RX ADMIN — GADOBUTROL 9 ML: 604.72 INJECTION INTRAVENOUS at 07:32

## 2023-10-19 NOTE — DISCHARGE INSTRUCTIONS
MRI Contrast Discharge Instructions    The IV contrast you received today will pass out of your body in your  urine. This will happen in the next 24 hours. You will not feel this process.  Your urine will not change color.    Drink at least 4 extra glasses of water or juice today (unless your doctor  has restricted your fluids). This reduces the stress on your kidneys.  You may take your regular medicines.    If you are on dialysis: It is best to have dialysis today.    If you have a reaction: Most reactions happen right away. If you have  any new symptoms after leaving the hospital (such as hives or swelling),  call your hospital at the correct number below. Or call your family doctor.  If you have breathing distress or wheezing, call 911.    Special instructions: ***    I have read and understand the above information.    Signature:______________________________________ Date:___________    Staff:__________________________________________ Date:___________     Time:__________    [place] [facility] Departments:    ___[place]: [phone]  ___[facility]: [phone]  ___[place]: [phone] ___[facility]: [phone]  ___[facility]: [phone]  ___[facility]: [phone]  ___[phone]  ___[facility]: [phone]  ___[phone]

## 2023-10-20 ENCOUNTER — TUMOR CONFERENCE (OUTPATIENT)
Dept: ONCOLOGY | Facility: CLINIC | Age: 73
End: 2023-10-20
Payer: COMMERCIAL

## 2023-10-20 NOTE — TUMOR CONFERENCE
Called and left message for patient requesting return call to discuss MRI results and tumor board discussion. Left direct line for return call.     Nicole Villarreal, RN, BSN

## 2023-10-20 NOTE — TUMOR CONFERENCE
Head & Neck Tumor Conference Note   Status: New   Staff: Dr. Orr    Tumor Site: Right carotid bifurcation  Tumor Pathology: TBD  Tumor Stage: TBD  Tumor Treatment: TBD  R neck mass consistent with paraganglioma, now has imaging.   Reason for Review: Review imaging, path, and POC    Brief History: This is a 73 year old F with a past medical history of new right neck mass with imaging consistent with paraganglioma. Plasma and urine metanephrines were collected to determine whether this is a secreting tumor, as well as an MRI and then discuss whether or not removal is necessary.   Pertinent PMH:   Past Medical History:   Diagnosis Date     Hypertension      Hypertrophic cardiomyopathy (H) 01/15/2021    Severe outflow tract obstruction Preserved LV systolic performance Basal septum: 20 mm Gadolinium enhancement positive: Basal septum No major positive and no other minor positive risk factors identified.     Obese      Paroxysmal atrial fibrillation (H) 01/15/2021    Dx Nov 2020 (NAIF) [id number] score = 3 Rx apixaban     Parsonage-Mcknight syndrome       Smoking Hx:   Social History     Tobacco Use     Smoking status: Never     Smokeless tobacco: Never   Vaping Use     Vaping Use: Never used   Substance Use Topics     Alcohol use: Never     Drug use: Never       Imaging:   CT Neck:   IMPRESSION:   1.  Enhancing mass at the left carotid bifurcation, likely paraganglioma (i.e. carotid body tumor).  2.  Skin marker superficial to a normal-appearing left submandibular gland and a mildly enlarged left level Ib lymph node, presumably reactive.  3.  Small areas of hypoattenuation within the bilateral palatine tonsils, presumably benign. Direct visualization would be typically recommended.  MR Neck:   10/19/23 -   Impression:    1. 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  2. 8 mm enhancing skin lesion overlying the left temporalis area,  recommend  direct visualization.  3. Chronic small right inferior cerebellar infarct  Tumor Board Recommendation:   Discussion:   On review of imaging there is a mass in the bifurcation of the carotid, consistent with a carotid body tumor. It appears very vascular. We are planning to take it out, as this is the patient's wishes.   Plan:   - Video visit with Dr. Orr to discuss plans for surgery.   Cooper Yi MD  Otolaryngology Head and Neck Surgery Resident, PGY-3    Documentation / Disclaimer Cancer Tumor Board Note: Cancer tumor board recommendations do not override what is determined to be reasonable care and treatment, which is dependent on the circumstances of a patient's case; the patient's medical, social, and personal concerns; and the clinical judgment of the oncologist [physician].

## 2023-10-25 NOTE — TUMOR CONFERENCE
Called and spoke with patient regarding the following MRI results:    Impression:    1. 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  2. 8 mm enhancing skin lesion overlying the left temporalis area,  recommend direct visualization.  3. Chronic small right inferior cerebellar infarct      Reviewed with patient tumor board discussion and advised we arrange virtual visit with patient and Dr. Orr to discuss further about surgery. Patient is in agreement and is scheduled for virtual appt on 11/8 at 4:45pm.    Of note- Patient does indicate that she has a skin lesion overlying the area of the left temporalis as indicated on MRI. She has already seen dermatology for this and is being followed. She will continue with her dermatologist for ongoing care of this if needed.     Patient is encouraged to call with further questions or concerns.     Nicole Villarreal, RN, BSN

## 2023-10-27 ENCOUNTER — OFFICE VISIT (OUTPATIENT)
Dept: CARDIOLOGY | Facility: CLINIC | Age: 73
End: 2023-10-27
Attending: INTERNAL MEDICINE
Payer: COMMERCIAL

## 2023-10-27 VITALS
WEIGHT: 193 LBS | OXYGEN SATURATION: 97 % | RESPIRATION RATE: 16 BRPM | DIASTOLIC BLOOD PRESSURE: 81 MMHG | HEART RATE: 66 BPM | BODY MASS INDEX: 34.19 KG/M2 | SYSTOLIC BLOOD PRESSURE: 133 MMHG

## 2023-10-27 DIAGNOSIS — I50.32 CHRONIC DIASTOLIC CONGESTIVE HEART FAILURE (H): ICD-10-CM

## 2023-10-27 DIAGNOSIS — E87.6 HYPOKALEMIA: ICD-10-CM

## 2023-10-27 DIAGNOSIS — I48.0 PAROXYSMAL ATRIAL FIBRILLATION (H): ICD-10-CM

## 2023-10-27 DIAGNOSIS — I10 ESSENTIAL HYPERTENSION: ICD-10-CM

## 2023-10-27 DIAGNOSIS — I42.2 HYPERTROPHIC CARDIOMYOPATHY (H): ICD-10-CM

## 2023-10-27 PROCEDURE — 99214 OFFICE O/P EST MOD 30 MIN: CPT | Performed by: INTERNAL MEDICINE

## 2023-10-27 NOTE — LETTER
10/27/2023    Diana Coello, CLIVE  [address] [facility] [address]    RE: Romy Izquierdo       Dear Colleague,     I had the pleasure of seeing Romy Izquierdo in the [facility].    [facility]   [address]  [place] [address]   [email or web address]   OFFICE: [phone]     CARDIOLOGY CLINIC NOTE     Thank you, Diana Mahoney, for asking the [facility] team to see Ms. Romy Izquierdo to Follow Up (Hypertrophic cardiomyopathy)         Assessment/Recommendations   Assessment:    Hypertrophic cardiomyopathy with dynamic LVOT gradient -  Now s/p septal myectomy at [facility] on 5/26/21. Stable with LV septal wall thickness of 16 mm.   Hypertension - well-controlled.  Paroxysmal atrial fibrillation - with RVR - s/p surgical pulmonary vein isolation. No subsequent afib on ILR interrogation.  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 Confirmed to be ligated by CT. Off of OAC and on aspirin alone.  Mild coronary artery disease with stenosis of 20-30% in all major coronary arteries - stable without angina.  Parsonage-monet syndrome with paralyzed right hemidiaphragm. With mild chronic dyspnea on exertion that is stable.  chronic congestive heart failure with preserved LVEF - currently compensated    Plan:  Continue medications without changes.  Continue regular exercise  Follow up in 1 year or sooner if needed.         History of Present Illness   Ms. Romy Izquierdo is a 73 year old female with a significant past history of hypertension who presents for follow-up of HOCM and atrial fibrillation.     Ms. Perez has hypertrophic obstructive cardiomyopathy with a prior echo performed on 8/20/2020 that demonstrated progression of the LVOT gradient, peaking at 100 mmHg, which is increased from 40 mmHg a year ago. This does not result in significant mitral valve regurgitation  but asymmetric septal hypertrophy was noted. 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 A cardiac MRI confirmed the diagnosis of HOCM. With an LVOT peak gradient of 80 mmHg and paroxysmal atrial fibrillation she was referred for septal myectomy. Her surgery occurred on 5/26/21 and consisted of a septal myectomy with pulmonary vein isolation and left atrial appendage excision. Her post-operative course was notable for paroxysmal atrial fibrillation treated with amiodarone for several weeks. She has not had recurrent afib and is monitored with an implantable loop recorder.    Romy reports continued chronic shortness of breath, worse with bending over. This is known and unchanged from prior. She is exercising regularly with walking, swimming [facility] 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    Other than noted above, Ms. Izquierdo denies any chest pain/pressure/tightness, shortness of breath at rest or with exertion, light headedness/dizziness, pre-syncope, syncope, lower extremity swelling, palpitations, paroxysmal nocturnal dyspnea (PND), or orthopnea.     Cardiac Problems and Cardiac Diagnostics     Most Recent Cardiac testing:  ECG dated 1/12/23 (personaly reviewed and interpreted): sinus rhythm with first degree AV block, left axis deviation, and LBBB    Implantable loop recorder 10/10/23  Presenting rhythm: normal sinus rate 60 bpm.  Battery status: OK  Arrhythmias: since 7/7/23, one tachy episode, this appears to be muscle artifact. 21 device-determined pause episodes; these are R wave undersensing, no true pauses seen. No AF, AT, triny, or symptom episodes.  Comments: normal loop recorder function.    Cardiac rhythm monitor 11/16/2020  CONCLUSION:  Symptoms of heart racing on 1 occasion associated with atrial flutter.   Symptoms of shortness  of breath on 9 occasions, associated with sinus rhythm and  first-degree AV block. Paroxysmal atrial fibrillation was present on 2 days with a  total of 5 hours and 6 hours, respectively with average ventricular response of  approximately 85 beats per minute and peak ventricular response of approximately 110  beats per minute.  A single short run of accelerated idioventricular rhythm was  noted on auto transmissions.  Rather marked nocturnal sinus bradycardia with heart  rates in the 30s were noted on multiple days.     ECHO (report reviewed):   TTE 10/3/23  1.Left ventricular size, wall motion and function are normal. The ejection fraction is 60-65%.  2.There is mild to moderate concentric left ventricular hypertrophy.  3.Normal right ventricle size and systolic function.  4.No hemodynamically significant valvular abnormalities on 2D or color flow imaging.  5.The ascending aorta is Mildly dilated.  There is no comparison study available.     Cardiac MRI 9/21/2020  IMPRESSIONS:    1.  Normal left ventricular size, thickened septum with maximal measurement 18-20 mm, inferolateral wall 7-8 mm. The quantified left ventricular ejection fraction is 69%. It appears less deformation of thickened septum per tagging images. There is no rest perfusion defect. It is evident that there is LVOT obstruction by flow turbulence and EMELYN.  The late delayed gadolinium enhancement study demonstrated that there are patchy gadolinium enhancement in thickened basal septal segment and also in basal lateral segment. Put all together, the findings are consistent with hypertrophic cardiomyopathy.   2.  Normal right ventricular size function.    3.  Mildly enlarged both atria.  4.  Moderate mitral valve regurgitation.  5.  Mildly dilated mid ascending aorta 40 mm.     Cardiac cath: from 5/25/21 at [facility] demonstrated   1.  CORONARY ANGIOGRAPHY   FINAL DIAGNOSIS   1.  Mild coronary artery atherosclerosis   PRE-PROCEDURE DIAGNOSIS   1.   Cardiomyopathy Hypertrophic (HCC)   CORONARY DIAGNOSTIC SUMMARY   Coronary artery dominance is right. Normal right coronary.   The left main coronary artery is 30% obstructed by a discrete lesion and 20% obstructed by a discrete lesion.   The proximal left anterior descending artery is 20% obstructed by a discrete lesion.   The middle left anterior descending artery is 20% obstructed by a tubular lesion.   The distal left anterior descending artery is 20% obstructed by a discrete lesion.   Noted systolic compression of septal  arteries, likely consistent with patient's known hypertrophic cardiomyopathy.      CT pulmonary vein 9/2/22  Complete surgical excision of the left atrial appendage.  No evidence of intracardiac thrombus.  Status post surgical myectomy of the left ventricular septum.  No significant coronary artery disease.         Medications  Allergies   Current Outpatient Medications   Medication Sig Dispense Refill    aspirin (ASA) 81 MG EC tablet Take 1 tablet (81 mg) by mouth daily 90 tablet 3    Biotin 10 MG CAPS Take 1 capsule by mouth daily      Cetaphil Moisturizing (CETAPHIL) external lotion Apply 1 Application topically daily      cycloSPORINE (RESTASIS) 0.05 % ophthalmic emulsion Place 1 drop into both eyes      furosemide (LASIX) 20 MG tablet Take 2 tablets (40 mg) by mouth daily as needed (fluid retention) 20 tablet 11    metoprolol succinate ER (TOPROL XL) 50 MG 24 hr tablet TAKE 1 TABLET(50 MG) BY MOUTH TWICE DAILY 180 tablet 3    multivitamin (CENTRUM SILVER) tablet Take 2 tablets by mouth daily      Omega-3 Fatty Acids (RA FISH OIL) 1000 MG CAPS 2 capsules in the morning and 1 capsule at night      sodium chloride (OCEAN) 0.65 % nasal spray 1 spray daily as needed      SUMAtriptan (IMITREX) 50 MG tablet Take 1 tablet (50 mg) by mouth as needed for migraine 10 tablet 2    Turmeric Curcumin 500 MG CAPS Take 1 tablet by mouth daily        Allergies   Allergen Reactions    Shellfish  Allergy Shortness Of Breath    Midazolam Hives     Welts    Acetaminophen     Dust Mites Other (See Comments)     Stuffy nose    Molds & Smuts      Other reaction(s): Wheezing  [place] blue grass,dust    Adhesive Tape Rash        Physical Examination Review of Systems   Vitals: /81 (BP Location: Left arm, Patient Position: Sitting, Cuff Size: Adult Regular)   Pulse 66   Resp 16   Wt 87.5 kg (193 lb)   SpO2 97%   BMI 34.19 kg/m    BMI= Body mass index is 34.19 kg/m .  Wt Readings from Last 3 Encounters:   10/27/23 87.5 kg (193 lb)   09/20/23 88 kg (194 lb)   04/26/23 88.5 kg (195 lb)       General: pleasant female. No acute distress.   HENT: external ears normal. Nares patent. Mucous membranes moist.  Eyes: perrla, extraocular muscles intact. No scleral icterus.   Neck: No JVD  Lungs: clear to auscultation  COR: regular rate and rhythm, 2/6 systolic flow murmur.  Abd: nondistended, BS present  Extrem: No edema        Please refer above for cardiac ROS details.       Past History   Past Medical History:   Past Medical History:   Diagnosis Date    Hypertension     Hypertrophic cardiomyopathy (H) 01/15/2021    Severe outflow tract obstruction Preserved LV systolic performance Basal septum: 20 mm Gadolinium enhancement positive: Basal septum No major positive and no other minor positive risk factors identified.    Obese     Paroxysmal atrial fibrillation (H) 01/15/2021    Dx Nov 2020 (NAIF) [id number] score = 3 Rx apixaban    Parsonage-Mcknight syndrome         Past Surgical History:   Past Surgical History:   Procedure Laterality Date    COLONOSCOPY N/A 1/26/2023    Procedure: COLONOSCOPY with polypectomy;  Surgeon: Kwadwo Kline MD;  Location: [facility] OR    EP LOOP RECORDER IMPLANT N/A 1/29/2021    Procedure: EP Loop Recorder Insertion;  Surgeon: Paul Garza MD;  Location: [facility] Cardiac Cath Lab;  Service: Cardiology    EXCISE BREAST CYST/FIBROADENOMA/TUMOR/DUCT LESION/NIPPLE  LESION/AREOLAR LESION      Description: Breast Surgery Lumpectomy;  Recorded: 03/07/2012;  Comments: adenoma    HC EXCISION NASAL POLYP(S), EXTENSIVE      Description: Extensive Excision Of Nasal Polyps;  Recorded: 03/07/2012;    HC KNEE SCOPE, DIAGNOSTIC      Description: Arthroscopy Knee Left;  Recorded: 03/07/2012;    NM EXCIS TENDON SHEATH LESION, HAND/FINGER      Description: Hand Excision Of A Tendon Cyst;  Recorded: 03/07/2012;    US GUIDED NEEDLE PLACEMENT  7/20/2020    ZZC REMOVAL OF OVARY(S)      Description: Oophorectomy;  Recorded: 06/26/2013;        Family History:   Family History   Problem Relation Age of Onset    Pacemaker Father     Coronary Artery Disease Father 65.00    Colon Cancer Father         XRT and chemo    Heart Failure Father 79.00    Cerebrovascular Disease Mother 89.00    Hypertension Mother     Endometrial Cancer Mother     Other - See Comments Mother         Scarlet fever vs rheumatic fever    Other - See Comments Cousin 16.00        Drown, Hypertrophic (apparently via father - not realted)    Cardiomyopathy Cousin         Hypertrophic (apparently via father - not realted)    Cardiomyopathy Child         Hypertrophic (apparently via father - not realted)    Sudden Death No family hx of         Social History:   Social History     Socioeconomic History    Marital status:      Spouse name: Not on file    Number of children: Not on file    Years of education: Not on file    Highest education level: Not on file   Occupational History    Not on file   Tobacco Use    Smoking status: Never    Smokeless tobacco: Never   Vaping Use    Vaping Use: Never used   Substance and Sexual Activity    Alcohol use: Never    Drug use: Never    Sexual activity: Not on file   Other Topics Concern    Not on file   Social History Narrative    Not on file     Social Determinants of Health     Financial Resource Strain: Not on file   Food Insecurity: Not on file   Transportation Needs: Not on file    Physical Activity: Not on file   Stress: Not on file   Social Connections: Not on file   Interpersonal Safety: Not on file   Housing Stability: Not on file            Lab Results    Chemistry/lipid CBC Cardiac Enzymes/BNP/TSH/INR   Lab Results   Component Value Date    CHOL 210 (H) 01/12/2023    HDL 64 01/12/2023    TRIG 106 01/12/2023    BUN 17.9 01/12/2023     (H) 01/12/2023    CO2 26 01/12/2023    Lab Results   Component Value Date    WBC 8.8 01/12/2023    HGB 14.8 01/12/2023    HCT 45.2 01/12/2023    MCV 87 01/12/2023     01/12/2023    Lab Results   Component Value Date    TROPONINI 0.03 07/23/2021     (H) 07/23/2021    TSH 2.48 05/27/2020                Thank you for allowing me to participate in the care of your patient.      Sincerely,     Hossein Arnold MD     [facility]  cc:   Hossein Arnold MD  [address], [address]  [place] [address]

## 2023-10-27 NOTE — PATIENT INSTRUCTIONS
It was a pleasure to meet with you today.      Below is a summary of your visit.   Your heart is overall stable when looking at your recent echo results and loop recorder data.   Continue your medications without changes.  Continue regular exercise  Follow up with me in a year or sooner if needed.     Please do not hesitate to call the [facility] with any questions or concerns at [phone].     Sincerely,

## 2023-10-27 NOTE — PROGRESS NOTES
[facility] HEART [facility]ARE   [address]  [place] [address]   [email or web address]   OFFICE: [phone]     CARDIOLOGY CLINIC NOTE     Thank you, Diana Mahoney, for asking the [facility] Heart Care team to see Ms. Romy Izquierdo to Follow Up (Hypertrophic cardiomyopathy)         Assessment/Recommendations   Assessment:    Hypertrophic cardiomyopathy with dynamic LVOT gradient -  Now s/p septal myectomy at [facility] on 5/26/21. Stable with LV septal wall thickness of 16 mm.   Hypertension - well-controlled.  Paroxysmal atrial fibrillation - with RVR - s/p surgical pulmonary vein isolation. No subsequent afib on ILR interrogation.  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 Confirmed to be ligated by CT. Off of OAC and on aspirin alone.  Mild coronary artery disease with stenosis of 20-30% in all major coronary arteries - stable without angina.  Parsonage-monet syndrome with paralyzed right hemidiaphragm. With mild chronic dyspnea on exertion that is stable.  chronic congestive heart failure with preserved LVEF - currently compensated    Plan:  Continue medications without changes.  Continue regular exercise  Follow up in 1 year or sooner if needed.         History of Present Illness   Ms. Romy Izquierdo is a 73 year old female with a significant past history of hypertension who presents for follow-up of HOCM and atrial fibrillation.     Ms. Perez has hypertrophic obstructive cardiomyopathy with a prior echo performed on 8/20/2020 that demonstrated progression of the LVOT gradient, peaking at 100 mmHg, which is increased from 40 mmHg a year ago. This does not result in significant mitral valve regurgitation but asymmetric septal hypertrophy was noted. She has had one episode of syncope in her lifetime that was attributed to Parsonage-Monet syndrome. A cardiac MRI confirmed the diagnosis of HOCM. With an LVOT peak gradient of 80 mmHg and  paroxysmal atrial fibrillation she was referred for septal myectomy. Her surgery occurred on 5/26/21 and consisted of a septal myectomy with pulmonary vein isolation and left atrial appendage excision. Her post-operative course was notable for paroxysmal atrial fibrillation treated with amiodarone for several weeks. She has not had recurrent afib and is monitored with an implantable loop recorder.    Romy reports continued chronic shortness of breath, worse with bending over. This is known and unchanged from prior. She is exercising regularly with walking, swimming and using exercise equipment at [facility]. 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    Other than noted above, Ms. Izquierdo denies any chest pain/pressure/tightness, shortness of breath at rest or with exertion, light headedness/dizziness, pre-syncope, syncope, lower extremity swelling, palpitations, paroxysmal nocturnal dyspnea (PND), or orthopnea.     Cardiac Problems and Cardiac Diagnostics     Most Recent Cardiac testing:  ECG dated 1/12/23 (personaly reviewed and interpreted): sinus rhythm with first degree AV block, left axis deviation, and LBBB    Implantable loop recorder 10/10/23  Presenting rhythm: normal sinus rate 60 bpm.  Battery status: OK  Arrhythmias: since 7/7/23, one tachy episode, this appears to be muscle artifact. 21 device-determined pause episodes; these are R wave undersensing, no true pauses seen. No AF, AT, triny, or symptom episodes.  Comments: normal loop recorder function.    Cardiac rhythm monitor 11/16/2020  CONCLUSION:  Symptoms of heart racing on 1 occasion associated with atrial flutter.   Symptoms of shortness of breath on 9 occasions, associated with sinus rhythm and  first-degree AV block. Paroxysmal atrial fibrillation was present on 2 days with a  total of 5 hours and 6 hours, respectively with average ventricular response  of  approximately 85 beats per minute and peak ventricular response of approximately 110  beats per minute.  A single short run of accelerated idioventricular rhythm was  noted on auto transmissions.  Rather marked nocturnal sinus bradycardia with heart  rates in the 30s were noted on multiple days.     ECHO (report reviewed):   TTE 10/3/23  1.Left ventricular size, wall motion and function are normal. The ejection fraction is 60-65%.  2.There is mild to moderate concentric left ventricular hypertrophy.  3.Normal right ventricle size and systolic function.  4.No hemodynamically significant valvular abnormalities on 2D or color flow imaging.  5.The ascending aorta is Mildly dilated.  There is no comparison study available.     Cardiac MRI 9/21/2020  IMPRESSIONS:    1.  Normal left ventricular size, thickened septum with maximal measurement 18-20 mm, inferolateral wall 7-8 mm. The quantified left ventricular ejection fraction is 69%. It appears less deformation of thickened septum per tagging images. There is no rest perfusion defect. It is evident that there is LVOT obstruction by flow turbulence and EMELYN.  The late delayed gadolinium enhancement study demonstrated that there are patchy gadolinium enhancement in thickened basal septal segment and also in basal lateral segment. Put all together, the findings are consistent with hypertrophic cardiomyopathy.   2.  Normal right ventricular size function.    3.  Mildly enlarged both atria.  4.  Moderate mitral valve regurgitation.  5.  Mildly dilated mid ascending aorta 40 mm.     Cardiac cath: from 5/25/21 at [facility] demonstrated   1.  CORONARY ANGIOGRAPHY   FINAL DIAGNOSIS   1.  Mild coronary artery atherosclerosis   PRE-PROCEDURE DIAGNOSIS   1.  Cardiomyopathy Hypertrophic (HCC)   CORONARY DIAGNOSTIC SUMMARY   Coronary artery dominance is right. Normal right coronary.   The left main coronary artery is 30% obstructed by a discrete lesion and 20% obstructed by a  discrete lesion.   The proximal left anterior descending artery is 20% obstructed by a discrete lesion.   The middle left anterior descending artery is 20% obstructed by a tubular lesion.   The distal left anterior descending artery is 20% obstructed by a discrete lesion.   Noted systolic compression of septal  arteries, likely consistent with patient's known hypertrophic cardiomyopathy.      CT pulmonary vein 9/2/22  Complete surgical excision of the left atrial appendage.  No evidence of intracardiac thrombus.  Status post surgical myectomy of the left ventricular septum.  No significant coronary artery disease.         Medications  Allergies   Current Outpatient Medications   Medication Sig Dispense Refill    aspirin (ASA) 81 MG EC tablet Take 1 tablet (81 mg) by mouth daily 90 tablet 3    Biotin 10 MG CAPS Take 1 capsule by mouth daily      Cetaphil Moisturizing (CETAPHIL) external lotion Apply 1 Application topically daily      cycloSPORINE (RESTASIS) 0.05 % ophthalmic emulsion Place 1 drop into both eyes      furosemide (LASIX) 20 MG tablet Take 2 tablets (40 mg) by mouth daily as needed (fluid retention) 20 tablet 11    metoprolol succinate ER (TOPROL XL) 50 MG 24 hr tablet TAKE 1 TABLET(50 MG) BY MOUTH TWICE DAILY 180 tablet 3    multivitamin (CENTRUM SILVER) tablet Take 2 tablets by mouth daily      Omega-3 Fatty Acids (RA FISH OIL) 1000 MG CAPS 2 capsules in the morning and 1 capsule at night      sodium chloride (OCEAN) 0.65 % nasal spray 1 spray daily as needed      SUMAtriptan (IMITREX) 50 MG tablet Take 1 tablet (50 mg) by mouth as needed for migraine 10 tablet 2    Turmeric Curcumin 500 MG CAPS Take 1 tablet by mouth daily        Allergies   Allergen Reactions    Shellfish Allergy Shortness Of Breath    Midazolam Hives     Welts    Acetaminophen     Dust Mites Other (See Comments)     Stuffy nose    Molds & Smuts      Other reaction(s): Wheezing  [place] blue grass,dust    Adhesive Tape  Rash        Physical Examination Review of Systems   Vitals: /81 (BP Location: Left arm, Patient Position: Sitting, Cuff Size: Adult Regular)   Pulse 66   Resp 16   Wt 87.5 kg (193 lb)   SpO2 97%   BMI 34.19 kg/m    BMI= Body mass index is 34.19 kg/m .  Wt Readings from Last 3 Encounters:   10/27/23 87.5 kg (193 lb)   09/20/23 88 kg (194 lb)   04/26/23 88.5 kg (195 lb)       General: pleasant female. No acute distress.   HENT: external ears normal. Nares patent. Mucous membranes moist.  Eyes: perrla, extraocular muscles intact. No scleral icterus.   Neck: No JVD  Lungs: clear to auscultation  COR: regular rate and rhythm, 2/6 systolic flow murmur.  Abd: nondistended, BS present  Extrem: No edema        Please refer above for cardiac ROS details.       Past History   Past Medical History:   Past Medical History:   Diagnosis Date    Hypertension     Hypertrophic cardiomyopathy (H) 01/15/2021    Severe outflow tract obstruction Preserved LV systolic performance Basal septum: 20 mm Gadolinium enhancement positive: Basal septum No major positive and no other minor positive risk factors identified.    Obese     Paroxysmal atrial fibrillation (H) 01/15/2021    Dx Nov 2020 (NAIF) [id number] score = 3 Rx apixaban    Parsonage-Mcknight syndrome         Past Surgical History:   Past Surgical History:   Procedure Laterality Date    COLONOSCOPY N/A 1/26/2023    Procedure: COLONOSCOPY with polypectomy;  Surgeon: Kwadwo Kline MD;  Location: [facility] OR    EP LOOP RECORDER IMPLANT N/A 1/29/2021    Procedure: EP Loop Recorder Insertion;  Surgeon: Paul Garza MD;  Location: [facility] Cardiac Cath Lab;  Service: Cardiology    EXCISE BREAST CYST/FIBROADENOMA/TUMOR/DUCT LESION/NIPPLE LESION/AREOLAR LESION      Description: Breast Surgery Lumpectomy;  Recorded: 03/07/2012;  Comments: adenoma    HC EXCISION NASAL POLYP(S), EXTENSIVE      Description: Extensive Excision Of Nasal Polyps;  Recorded: 03/07/2012;     HC KNEE SCOPE, DIAGNOSTIC      Description: Arthroscopy Knee Left;  Recorded: 03/07/2012;    VA EXCIS TENDON SHEATH LESION, HAND/FINGER      Description: Hand Excision Of A Tendon Cyst;  Recorded: 03/07/2012;    US GUIDED NEEDLE PLACEMENT  7/20/2020    ZZC REMOVAL OF OVARY(S)      Description: Oophorectomy;  Recorded: 06/26/2013;        Family History:   Family History   Problem Relation Age of Onset    Pacemaker Father     Coronary Artery Disease Father 65.00    Colon Cancer Father         XRT and chemo    Heart Failure Father 79.00    Cerebrovascular Disease Mother 89.00    Hypertension Mother     Endometrial Cancer Mother     Other - See Comments Mother         Scarlet fever vs rheumatic fever    Other - See Comments Cousin 16.00        Drown, Hypertrophic (apparently via father - not realted)    Cardiomyopathy Cousin         Hypertrophic (apparently via father - not realted)    Cardiomyopathy Child         Hypertrophic (apparently via father - not realted)    Sudden Death No family hx of         Social History:   Social History     Socioeconomic History    Marital status:      Spouse name: Not on file    Number of children: Not on file    Years of education: Not on file    Highest education level: Not on file   Occupational History    Not on file   Tobacco Use    Smoking status: Never    Smokeless tobacco: Never   Vaping Use    Vaping Use: Never used   Substance and Sexual Activity    Alcohol use: Never    Drug use: Never    Sexual activity: Not on file   Other Topics Concern    Not on file   Social History Narrative    Not on file     Social Determinants of Health     Financial Resource Strain: Not on file   Food Insecurity: Not on file   Transportation Needs: Not on file   Physical Activity: Not on file   Stress: Not on file   Social Connections: Not on file   Interpersonal Safety: Not on file   Housing Stability: Not on file            Lab Results    Chemistry/lipid CBC Cardiac Enzymes/BNP/TSH/INR    Lab Results   Component Value Date    CHOL 210 (H) 01/12/2023    HDL 64 01/12/2023    TRIG 106 01/12/2023    BUN 17.9 01/12/2023     (H) 01/12/2023    CO2 26 01/12/2023    Lab Results   Component Value Date    WBC 8.8 01/12/2023    HGB 14.8 01/12/2023    HCT 45.2 01/12/2023    MCV 87 01/12/2023     01/12/2023    Lab Results   Component Value Date    TROPONINI 0.03 07/23/2021     (H) 07/23/2021    TSH 2.48 05/27/2020

## 2023-11-09 ENCOUNTER — PREP FOR PROCEDURE (OUTPATIENT)
Dept: OTOLARYNGOLOGY | Facility: CLINIC | Age: 73
End: 2023-11-09
Payer: COMMERCIAL

## 2023-11-09 DIAGNOSIS — R22.1 NECK MASS: Primary | ICD-10-CM

## 2023-11-09 DIAGNOSIS — D44.6 CAROTID BODY TUMOR (H): ICD-10-CM

## 2023-11-10 NOTE — TELEPHONE ENCOUNTER
Patient calling to state she found a old message she hadn't replied to     Patient was just asking what it was about. Writer explained it was for a AWV and the RN was reaching out to see if they wanted to set up    Writer let patient know we will send this to the RN team to have them reach out to see if she would like to schedule this     Contact Information  [phone] (Mobile)

## 2023-11-21 ENCOUNTER — PREP FOR PROCEDURE (OUTPATIENT)
Dept: OTOLARYNGOLOGY | Facility: CLINIC | Age: 73
End: 2023-11-21
Payer: COMMERCIAL

## 2023-11-21 ENCOUNTER — PATIENT OUTREACH (OUTPATIENT)
Dept: OTOLARYNGOLOGY | Facility: CLINIC | Age: 73
End: 2023-11-21
Payer: COMMERCIAL

## 2023-11-21 NOTE — TELEPHONE ENCOUNTER
Received a call from patient indicating that should would like to proceed with surgery with Dr. Orr prior to end of the year. Returned call and advised that surgery orders are in but likely will be scheduling surgery into January. Advised patient that surgery schedulers will reach out to schedule. Left direct line and encouraged patient to return call with further questions or concerns.       Nicole Villarreal, RN, BSN

## 2023-11-27 ENCOUNTER — TELEPHONE (OUTPATIENT)
Dept: OTOLARYNGOLOGY | Facility: CLINIC | Age: 73
End: 2023-11-27
Payer: COMMERCIAL

## 2023-11-27 NOTE — TELEPHONE ENCOUNTER
Patient is scheduled for surgery with Dr. Orr.     Spoke with: Patient     Date of Surgery: 1/22/24    Location: [facility] OR     Pre op with Provider: CHRISS     H&P: Patient will schedule at [facility] in [place]. Patient did voice concerns being able to be seen prior to surgery, writer asked that should that be the case patient call our office back at [phone] and we can either schedule patient a PAC visit or reschedule surgery to a later date.     Additional imaging/appointments: Patient is scheduled for a 1 week post op with Dr. Orr on 1/31/24 at 2:30.     Surgery packet: Will mail packet, confirmed with patient address in chart works best. Patient made aware arrival time will not be listed within packet. Soap will be sent as well.      Additional comments: Writer informed patient a pre op nurse will call a few days prior to surgery to go over further details/give arrival time.         Kaylie Sinha on 11/27/2023 at 2:55 PM

## 2023-12-05 DIAGNOSIS — I50.32 CHRONIC DIASTOLIC CONGESTIVE HEART FAILURE (H): ICD-10-CM

## 2023-12-05 DIAGNOSIS — I42.2 HYPERTROPHIC CARDIOMYOPATHY (H): ICD-10-CM

## 2023-12-05 RX ORDER — METOPROLOL SUCCINATE 50 MG/1
50 TABLET, EXTENDED RELEASE ORAL 2 TIMES DAILY
Qty: 180 TABLET | Refills: 3 | Status: SHIPPED | OUTPATIENT
Start: 2023-12-05

## 2023-12-19 ENCOUNTER — TELEPHONE (OUTPATIENT)
Dept: CARDIOLOGY | Facility: CLINIC | Age: 73
End: 2023-12-19
Payer: COMMERCIAL

## 2023-12-19 NOTE — TELEPHONE ENCOUNTER
[facility] Call Center    Phone Message    May a detailed message be left on voicemail: yes     Reason for Call: Other: patient is calling regarding an RSV shot prior to her surgery fot tumor removal, please advise, thank you.     Action Taken: Other: cardiology    Travel Screening: Not Applicable  Thank you!  [facility]

## 2023-12-19 NOTE — TELEPHONE ENCOUNTER
Spoke with Pt regarding call back request. Discussed immunization guidelines in general terms. Pt requested confirmation from Dr. Arnold, advised currently out of the office but will request review upon his return. Pt's procedure 01/22/24.-JOLEEN

## 2023-12-27 NOTE — TELEPHONE ENCOUNTER
Spoke with patient. Reviewed response and recommendations per JACQUIE. Patient verbalized understanding and had no further questions. -suman

## 2023-12-27 NOTE — TELEPHONE ENCOUNTER
I do not have a strong opinion either way on having the RSV vaccine. Would not get it within 1 week prior to a planned surgery as to avoid potential symptoms from the vaccine delaying surgery.     JACQUIE

## 2024-01-04 ENCOUNTER — OFFICE VISIT (OUTPATIENT)
Dept: INTERNAL MEDICINE | Facility: CLINIC | Age: 74
End: 2024-01-04
Payer: COMMERCIAL

## 2024-01-04 VITALS
WEIGHT: 197.3 LBS | DIASTOLIC BLOOD PRESSURE: 86 MMHG | RESPIRATION RATE: 20 BRPM | SYSTOLIC BLOOD PRESSURE: 133 MMHG | HEART RATE: 58 BPM | HEIGHT: 63 IN | BODY MASS INDEX: 34.96 KG/M2 | OXYGEN SATURATION: 96 % | TEMPERATURE: 97.8 F

## 2024-01-04 DIAGNOSIS — D44.7 PARAGANGLIOMA (H): ICD-10-CM

## 2024-01-04 DIAGNOSIS — G54.5 PARSONAGE-TURNER SYNDROME: ICD-10-CM

## 2024-01-04 DIAGNOSIS — I42.2 HYPERTROPHIC CARDIOMYOPATHY (H): ICD-10-CM

## 2024-01-04 DIAGNOSIS — I10 ESSENTIAL HYPERTENSION: ICD-10-CM

## 2024-01-04 DIAGNOSIS — Z29.11 NEED FOR VACCINATION AGAINST RESPIRATORY SYNCYTIAL VIRUS: ICD-10-CM

## 2024-01-04 DIAGNOSIS — G43.709 CHRONIC MIGRAINE WITHOUT AURA WITHOUT STATUS MIGRAINOSUS, NOT INTRACTABLE: ICD-10-CM

## 2024-01-04 DIAGNOSIS — E66.01 MORBID OBESITY (H): ICD-10-CM

## 2024-01-04 DIAGNOSIS — I50.32 CHRONIC DIASTOLIC CONGESTIVE HEART FAILURE (H): ICD-10-CM

## 2024-01-04 DIAGNOSIS — Z01.818 PREOPERATIVE EXAMINATION: Primary | ICD-10-CM

## 2024-01-04 DIAGNOSIS — N18.2 CKD (CHRONIC KIDNEY DISEASE) STAGE 2, GFR 60-89 ML/MIN: ICD-10-CM

## 2024-01-04 LAB
ALBUMIN SERPL BCG-MCNC: 4.2 G/DL (ref 3.5–5.2)
ALP SERPL-CCNC: 72 U/L (ref 40–150)
ALT SERPL W P-5'-P-CCNC: 20 U/L (ref 0–50)
ANION GAP SERPL CALCULATED.3IONS-SCNC: 8 MMOL/L (ref 7–15)
AST SERPL W P-5'-P-CCNC: 25 U/L (ref 0–45)
BILIRUB SERPL-MCNC: 0.6 MG/DL
BUN SERPL-MCNC: 16.5 MG/DL (ref 8–23)
CALCIUM SERPL-MCNC: 9.7 MG/DL (ref 8.8–10.2)
CHLORIDE SERPL-SCNC: 103 MMOL/L (ref 98–107)
CHOLEST SERPL-MCNC: 208 MG/DL
CREAT SERPL-MCNC: 0.77 MG/DL (ref 0.51–0.95)
DEPRECATED HCO3 PLAS-SCNC: 31 MMOL/L (ref 22–29)
EGFRCR SERPLBLD CKD-EPI 2021: 82 ML/MIN/1.73M2
ERYTHROCYTE [DISTWIDTH] IN BLOOD BY AUTOMATED COUNT: 13.5 % (ref 10–15)
FASTING STATUS PATIENT QL REPORTED: NO
GLUCOSE SERPL-MCNC: 100 MG/DL (ref 70–99)
HCT VFR BLD AUTO: 45.8 % (ref 35–47)
HDLC SERPL-MCNC: 68 MG/DL
HGB BLD-MCNC: 15.2 G/DL (ref 11.7–15.7)
LDLC SERPL CALC-MCNC: 124 MG/DL
MCH RBC QN AUTO: 28.7 PG (ref 26.5–33)
MCHC RBC AUTO-ENTMCNC: 33.2 G/DL (ref 31.5–36.5)
MCV RBC AUTO: 87 FL (ref 78–100)
NONHDLC SERPL-MCNC: 140 MG/DL
PLATELET # BLD AUTO: 188 10E3/UL (ref 150–450)
POTASSIUM SERPL-SCNC: 3.8 MMOL/L (ref 3.4–5.3)
PROT SERPL-MCNC: 6.9 G/DL (ref 6.4–8.3)
RBC # BLD AUTO: 5.29 10E6/UL (ref 3.8–5.2)
SODIUM SERPL-SCNC: 142 MMOL/L (ref 135–145)
TRIGL SERPL-MCNC: 82 MG/DL
WBC # BLD AUTO: 9.6 10E3/UL (ref 4–11)

## 2024-01-04 PROCEDURE — 80053 COMPREHEN METABOLIC PANEL: CPT | Performed by: NURSE PRACTITIONER

## 2024-01-04 PROCEDURE — 80061 LIPID PANEL: CPT | Performed by: NURSE PRACTITIONER

## 2024-01-04 PROCEDURE — 85027 COMPLETE CBC AUTOMATED: CPT | Performed by: NURSE PRACTITIONER

## 2024-01-04 PROCEDURE — 93005 ELECTROCARDIOGRAM TRACING: CPT | Performed by: NURSE PRACTITIONER

## 2024-01-04 PROCEDURE — 36415 COLL VENOUS BLD VENIPUNCTURE: CPT | Performed by: NURSE PRACTITIONER

## 2024-01-04 PROCEDURE — 99214 OFFICE O/P EST MOD 30 MIN: CPT | Mod: 25 | Performed by: NURSE PRACTITIONER

## 2024-01-04 RX ORDER — RESPIRATORY SYNCYTIAL VIRUS VACCINE 120MCG/0.5
0.5 KIT INTRAMUSCULAR ONCE
Qty: 1 EACH | Refills: 0 | Status: CANCELLED | OUTPATIENT
Start: 2024-01-04 | End: 2024-01-04

## 2024-01-04 RX ORDER — VIT C/E/ZN/COPPR/LUTEIN/ZEAXAN 60 MG-6 MG
1 CAPSULE ORAL DAILY
COMMUNITY
Start: 2023-05-01

## 2024-01-04 ASSESSMENT — PAIN SCALES - GENERAL: PAINLEVEL: NO PAIN (0)

## 2024-01-04 NOTE — PROGRESS NOTES
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    Assessment & Plan     The proposed surgical procedure is considered INTERMEDIATE risk.    Problem List Items Addressed This Visit       Migraine Headache    Parsonage-Mcknight syndrome    Essential Hypertension    Hypertrophic cardiomyopathy (H)    Obesity (BMI 35.0-39.9) with comorbidity (H)     Other Visit Diagnoses       Preoperative examination    -  Primary    Relevant Orders    EKG 12-lead, tracing only (Completed)    Need for vaccination against respiratory syncytial virus        Chronic diastolic congestive heart failure (H)        Relevant Orders    Lipid panel reflex to direct LDL Non-fasting (Completed)    CBC with Platelets (Completed)    CKD (chronic kidney disease) stage 2, GFR 60-89 ml/min        Relevant Orders    Comprehensive metabolic panel (Completed)    Paraganglioma (H)                 - No identified additional risk factors other than previously addressed    Antiplatelet or Anticoagulation Medication Instructions:   - aspirin: Discontinue aspirin 7-10 days prior to procedure to reduce bleeding risk. It should be resumed postoperatively.     Additional  Medication Instructions:   - Beta Blockers: Continue taking on the day of surgery.   - Diuretics: HOLD on the day of surgery.    RECOMMENDATION:  APPROVAL GIVEN to proceed with proposed procedure, without further diagnostic evaluation.      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  Patient previously followed by outside ENT for left submandibular swelling prior biopsy showed benign salivary tissue.  CT scan demonstrated incidental tumor of the left carotid bifurcation she noted fluctuating voice and hoarseness as well as occasional choking with liquids and dry foods.  She does report having occasional episodic hot flashes and excessive sweating denies any heart palpitations or racing heart she has a history of hypertrophic cardiomyopathy status postcardiac surgery 2 years ago and history of A-fib ablation.        12/28/2023    10:30 AM   Preop Questions   1. Have you ever had a heart attack or stroke? No   2. Have you ever had surgery on your heart or blood vessels, such as a stent placement, a coronary artery bypass, or surgery on an artery in your head, neck, heart, or legs? YES -    3. Do you have chest pain with activity? No   4. Do you have a history of  heart failure? YES -    5. Do you currently have a cold, bronchitis or symptoms of other infection? No   6. Do you have a cough, shortness of breath, or wheezing? YES -    7. Do you or anyone in your family have previous history of blood clots? UNKNOWN -    8. Do you or does anyone in your family have a serious bleeding problem such as prolonged bleeding following surgeries or cuts? No   9. Have you ever had problems with anemia or been told to take iron pills? No    10. Have you had any abnormal blood loss such as black, tarry or bloody stools, or abnormal vaginal bleeding? YES -    11. Have you ever had a blood transfusion? YES -    11a. Have you ever had a transfusion reaction? No   12. Are you willing to have a blood transfusion if it is medically needed before, during, or after your surgery? Yes   13. Have you or any of your relatives ever had problems with anesthesia? UNKNOWN -    14. Do you have sleep apnea, excessive snoring or daytime drowsiness? No   15. Do you have any artifical heart valves or other implanted medical devices like a pacemaker, defibrillator, or continuous glucose monitor? YES -    15a. What type of device do you have? [employer] loop recorder   15b. Name of the clinic that manages your device:  [facility]   16. Do you have artificial joints? No   17. Are you allergic to latex? No       Health Care Directive:  Patient does not have a Health Care Directive or Living Will: Discussed advance care planning with patient; however, patient declined at this time.    Preoperative Review of :   reviewed - no record of controlled substances prescribed.      Status of Chronic Conditions:  See problem list for active medical problems.  Problems all longstanding and stable, except as noted/documented.  See ROS for pertinent symptoms related to these conditions.    Review of Systems  Constitutional, neuro, ENT, endocrine, pulmonary, cardiac, gastrointestinal, genitourinary, musculoskeletal, integument and psychiatric systems are negative, except as otherwise noted.    Patient Active Problem List    Diagnosis Date Noted    A-fib (H) 01/21/2021     Priority: Medium     Added automatically from request for surgery [id number]        Hypertrophic cardiomyopathy (H) 01/15/2021     Priority: Medium     Severe outflow tract obstruction  Preserved LV systolic performance  Basal septum: 20 mm  Gadolinium enhancement positive: Basal septum  No major  positive and no other minor positive risk factors identified.        Obesity (BMI 35.0-39.9) with comorbidity (H) 01/15/2021     Priority: Medium    Paroxysmal atrial fibrillation (H) 01/15/2021     Priority: Medium     Dx Nov 2020 (NAIF)  [id number] score = 3  Rx apixaban        Parsonage-Mcknight syndrome      Priority: Medium     Created by Conversion        Migraine Headache      Priority: Medium     Created by Conversion  Replacement Utility updated for latest IMO load        Essential Hypertension      Priority: Medium     Created by Conversion  Replacement Utility updated for latest IMO load          Past Medical History:   Diagnosis Date    Hypertension     Hypertrophic cardiomyopathy (H) 01/15/2021    Severe outflow tract obstruction Preserved LV systolic performance Basal septum: 20 mm Gadolinium enhancement positive: Basal septum No major positive and no other minor positive risk factors identified.    Obese     Paroxysmal atrial fibrillation (H) 01/15/2021    Dx Nov 2020 (NAIF) [id number] score = 3 Rx apixaban    Parsonage-Mcknight syndrome      Past Surgical History:   Procedure Laterality Date    COLONOSCOPY N/A 1/26/2023    Procedure: COLONOSCOPY with polypectomy;  Surgeon: Kwadwo Kline MD;  Location: [facility] Main OR    EP LOOP RECORDER IMPLANT N/A 1/29/2021    Procedure: EP Loop Recorder Insertion;  Surgeon: Paul Garza MD;  Location: [facility] Cardiac Cath Lab;  Service: Cardiology    EXCISE BREAST CYST/FIBROADENOMA/TUMOR/DUCT LESION/NIPPLE LESION/AREOLAR LESION      Description: Breast Surgery Lumpectomy;  Recorded: 03/07/2012;  Comments: adenoma    HC EXCISION NASAL POLYP(S), EXTENSIVE      Description: Extensive Excision Of Nasal Polyps;  Recorded: 03/07/2012;    HC KNEE SCOPE, DIAGNOSTIC      Description: Arthroscopy Knee Left;  Recorded: 03/07/2012;    OK EXCIS TENDON SHEATH LESION, HAND/FINGER      Description: Hand Excision Of A Tendon Cyst;  Recorded: 03/07/2012;    US GUIDED  NEEDLE PLACEMENT  7/20/2020    [facility] REMOVAL OF OVARY(S)      Description: Oophorectomy;  Recorded: 06/26/2013;     Current Outpatient Medications   Medication Sig Dispense Refill    aspirin (ASA) 81 MG EC tablet Take 1 tablet (81 mg) by mouth daily 90 tablet 3    Biotin 10 MG CAPS Take 1 capsule by mouth daily      Cetaphil Moisturizing (CETAPHIL) external lotion Apply 1 Application topically daily      cycloSPORINE (RESTASIS) 0.05 % ophthalmic emulsion Place 1 drop into both eyes      furosemide (LASIX) 20 MG tablet Take 2 tablets (40 mg) by mouth daily as needed (fluid retention) 20 tablet 11    metoprolol succinate ER (TOPROL XL) 50 MG 24 hr tablet Take 1 tablet (50 mg) by mouth 2 times daily 180 tablet 3    multivitamin  with lutein (OCUVITE WITH LUTEIN) CAPS per capsule Take 1 capsule by mouth daily      multivitamin (CENTRUM SILVER) tablet Take 2 tablets by mouth daily      Omega-3 Fatty Acids (RA FISH OIL) 1000 MG CAPS 2 capsules in the morning and 1 capsule at night      SUMAtriptan (IMITREX) 50 MG tablet Take 1 tablet (50 mg) by mouth as needed for migraine 10 tablet 2    Turmeric Curcumin 500 MG CAPS Take 1 tablet by mouth daily      sodium chloride (OCEAN) 0.65 % nasal spray 1 spray daily as needed (Patient not taking: Reported on 1/4/2024)         Allergies   Allergen Reactions    Shellfish Allergy Shortness Of Breath    Midazolam Hives     Welts    Acetaminophen     Dust Mites Other (See Comments)     Stuffy nose    Molds & Smuts      Other reaction(s): Wheezing  [place] blue grass,dust    Adhesive Tape Rash    Latex Rash        Social History     Tobacco Use    Smoking status: Never    Smokeless tobacco: Never   Substance Use Topics    Alcohol use: Never     Family History   Problem Relation Age of Onset    Pacemaker Father     Coronary Artery Disease Father 65.00    Colon Cancer Father         XRT and chemo    Heart Failure Father 79.00    Cerebrovascular Disease Mother 89.00    Hypertension Mother   "   Endometrial Cancer Mother     Other - See Comments Mother         Scarlet fever vs rheumatic fever    Other - See Comments Cousin 16.00        Drown, Hypertrophic (apparently via father - not realted)    Cardiomyopathy Cousin         Hypertrophic (apparently via father - not realted)    Cardiomyopathy Child         Hypertrophic (apparently via father - not realted)    Sudden Death No family hx of      History   Drug Use Unknown         Objective     /86 (BP Location: Right arm, Patient Position: Sitting, Cuff Size: Adult Large)   Pulse 58   Temp 97.8  F (36.6  C) (Oral)   Resp 20   Ht 1.588 m (5' 2.5\")   Wt 89.5 kg (197 lb 4.8 oz)   LMP  (LMP Unknown)   SpO2 96%   BMI 35.51 kg/m      Physical Exam    GENERAL APPEARANCE: healthy, alert and no distress     EYES: EOMI, PERRL     HENT: nose and mouth without ulcers or lesions     NECK: no adenopath     RESP: lungs clear to auscultation - no rales, rhonchi or wheezes     CV: regular rates and rhythm, normal S1 S2, systolic murmur appreciated      MS: extremities normal- no gross deformities noted, no evidence of inflammation in joints, FROM in all extremities.     SKIN: no suspicious lesions or rashes     NEURO: Normal strength and tone, sensory exam grossly normal, mentation intact and speech normal     PSYCH: mentation appears normal. and affect normal/bright     LYMPHATICS: No cervical adenopathy    Recent Labs   Lab Test 08/12/23  1453 01/12/23  1110   HGB  --  14.8   PLT  --  185   NA  --  146*   POTASSIUM  --  4.0   CR 0.9 0.79        Diagnostics:  Recent Results (from the past 240 hour(s))   Lipid panel reflex to direct LDL Non-fasting    Collection Time: 01/04/24  1:36 PM   Result Value Ref Range    Cholesterol 208 (H) <200 mg/dL    Triglycerides 82 <150 mg/dL    Direct Measure HDL 68 >=50 mg/dL    LDL Cholesterol Calculated 124 (H) <=100 mg/dL    Non HDL Cholesterol 140 (H) <130 mg/dL    Patient Fasting > 8hrs? No    CBC with Platelets    " Collection Time: 01/04/24  1:36 PM   Result Value Ref Range    WBC Count 9.6 4.0 - 11.0 10e3/uL    RBC Count 5.29 (H) 3.80 - 5.20 10e6/uL    Hemoglobin 15.2 11.7 - 15.7 g/dL    Hematocrit 45.8 35.0 - 47.0 %    MCV 87 78 - 100 fL    MCH 28.7 26.5 - 33.0 pg    MCHC 33.2 31.5 - 36.5 g/dL    RDW 13.5 10.0 - 15.0 %    Platelet Count 188 150 - 450 10e3/uL   Comprehensive metabolic panel    Collection Time: 01/04/24  1:36 PM   Result Value Ref Range    Sodium 142 135 - 145 mmol/L    Potassium 3.8 3.4 - 5.3 mmol/L    Carbon Dioxide (CO2) 31 (H) 22 - 29 mmol/L    Anion Gap 8 7 - 15 mmol/L    Urea Nitrogen 16.5 8.0 - 23.0 mg/dL    Creatinine 0.77 0.51 - 0.95 mg/dL    GFR Estimate 82 >60 mL/min/1.73m2    Calcium 9.7 8.8 - 10.2 mg/dL    Chloride 103 98 - 107 mmol/L    Glucose 100 (H) 70 - 99 mg/dL    Alkaline Phosphatase 72 40 - 150 U/L    AST 25 0 - 45 U/L    ALT 20 0 - 50 U/L    Protein Total 6.9 6.4 - 8.3 g/dL    Albumin 4.2 3.5 - 5.2 g/dL    Bilirubin Total 0.6 <=1.2 mg/dL   EKG 12-lead, tracing only    Collection Time: 01/04/24  2:05 PM   Result Value Ref Range    Systolic Blood Pressure  mmHg    Diastolic Blood Pressure  mmHg    Ventricular Rate 55 BPM    Atrial Rate 55 BPM    WY Interval 246 ms    QRS Duration 172 ms     ms    QTc 508 ms    P Axis 23 degrees    R AXIS -54 degrees    T Axis 137 degrees    Interpretation ECG       Sinus bradycardia with 1st degree A-V block  Left axis deviation  Left bundle branch block  Abnormal ECG  When compared with ECG of 12-JAN-2023 10:51,  No significant change was found  Confirmed by LAUREN MENEZES MD LOC:JN ([id number]) on 1/5/2024 4:16:33 PM            Revised Cardiac Risk Index (RCRI):  The patient has the following serious cardiovascular risks for perioperative complications:   - Congestive Heart Failure (pulmonary edema, PND, s3 selina, CXR with pulmonary congestion, basilar rales) = 1 point     RCRI Interpretation: 1 point: Class II (low risk - 0.9% complication rate)          Signed Electronically by: Diana Coello NP  Copy of this evaluation report is provided to requesting physician.

## 2024-01-05 LAB
ATRIAL RATE - MUSE: 55 BPM
DIASTOLIC BLOOD PRESSURE - MUSE: NORMAL MMHG
INTERPRETATION ECG - MUSE: NORMAL
P AXIS - MUSE: 23 DEGREES
PR INTERVAL - MUSE: 246 MS
QRS DURATION - MUSE: 172 MS
QT - MUSE: 532 MS
QTC - MUSE: 508 MS
R AXIS - MUSE: -54 DEGREES
SYSTOLIC BLOOD PRESSURE - MUSE: NORMAL MMHG
T AXIS - MUSE: 137 DEGREES
VENTRICULAR RATE- MUSE: 55 BPM

## 2024-01-05 PROCEDURE — 93010 ELECTROCARDIOGRAM REPORT: CPT | Performed by: INTERNAL MEDICINE

## 2024-01-18 ENCOUNTER — ANCILLARY PROCEDURE (OUTPATIENT)
Dept: CARDIOLOGY | Facility: CLINIC | Age: 74
End: 2024-01-18
Attending: INTERNAL MEDICINE
Payer: COMMERCIAL

## 2024-01-18 ENCOUNTER — ANCILLARY ORDERS (OUTPATIENT)
Dept: CARDIOLOGY | Facility: CLINIC | Age: 74
End: 2024-01-18

## 2024-01-18 DIAGNOSIS — Z98.890 HISTORY OF LOOP RECORDER: ICD-10-CM

## 2024-01-18 DIAGNOSIS — I42.9 CARDIOMYOPATHY (H): ICD-10-CM

## 2024-01-18 DIAGNOSIS — I48.0 PAF (PAROXYSMAL ATRIAL FIBRILLATION) (H): ICD-10-CM

## 2024-01-18 DIAGNOSIS — Z45.09 ENCOUNTER FOR LOOP RECORDER CHECK: Primary | ICD-10-CM

## 2024-01-19 ENCOUNTER — ANESTHESIA EVENT (OUTPATIENT)
Dept: SURGERY | Facility: CLINIC | Age: 74
DRG: 041 | End: 2024-01-19
Payer: COMMERCIAL

## 2024-01-20 ASSESSMENT — ENCOUNTER SYMPTOMS: DYSRHYTHMIAS: 1

## 2024-01-20 NOTE — ANESTHESIA PREPROCEDURE EVALUATION
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    Obese     Paroxysmal atrial fibrillation (H) 01/15/2021    Dx 2020 (NAIF) [id number] score = 3 Rx apixaban    Parsonage-Mcknight syndrome       Past Surgical History:   Procedure Laterality Date    COLONOSCOPY N/A 2023    Procedure: COLONOSCOPY with polypectomy;  Surgeon: Kwadwo Kline MD;  Location: [facility] Main OR    EP LOOP RECORDER IMPLANT N/A 2021    Procedure: EP Loop Recorder Insertion;  Surgeon: Paul Garza MD;  Location: [facility] Cardiac Cath Lab;  Service: Cardiology    EXCISE BREAST CYST/FIBROADENOMA/TUMOR/DUCT LESION/NIPPLE LESION/AREOLAR LESION      Description: Breast Surgery Lumpectomy;  Recorded: 2012;  Comments: adenoma    HC EXCISION NASAL POLYP(S), EXTENSIVE      Description: Extensive Excision Of Nasal Polyps;  Recorded: 2012;    HC KNEE SCOPE, DIAGNOSTIC      Description: Arthroscopy Knee Left;  Recorded: 2012;    [facility] EXCIS TENDON SHEATH LESION, HAND/FINGER      Description: Hand Excision Of A Tendon Cyst;  Recorded: 2012;    US GUIDED NEEDLE PLACEMENT  2020    [facility] REMOVAL OF OVARY(S)      Description: Oophorectomy;  Recorded: 2013;      Allergies   Allergen Reactions    Shellfish Allergy Shortness Of Breath    Midazolam Hives     Welts    Acetaminophen     Dust Mites Other (See Comments)     Stuffy nose    Molds & Smuts      Other reaction(s): Wheezing  [place] blue grass,dust    Adhesive Tape Rash    Latex Rash      Social  History     Tobacco Use    Smoking status: Never     Passive exposure: Current    Smokeless tobacco: Never   Substance Use Topics    Alcohol use: Not Currently     Comment: rarely      Wt Readings from Last 1 Encounters:   01/04/24 89.5 kg (197 lb 4.8 oz)        Anesthesia Evaluation   Pt has had prior anesthetic. Type: MAC and General.        ROS/MED HX  ENT/Pulmonary:  - neg pulmonary ROS     Neurologic:  - neg neurologic ROS     Cardiovascular:     (+) Dyslipidemia hypertension- -   -  - -                        dysrhythmias, a-fib,  valvular problems/murmurs (HOCUM)      Previous cardiac testing   Echo: Date: 04/26/2023 Results:  Left ventricular size, wall motion and function are normal. The ejection  fraction is 60-65%. There is mild to moderate concentric left ventricular hypertrophy. Normal right ventricle size and systolic function. No hemodynamically significant valvular abnormalities on 2D or color flow imaging. The ascending aorta is Mildly dilated.    Stress Test:  Date: Results:    ECG Reviewed:  Date: 1/4/2024 Results:  Sinus bradycardia (HR 55) with 1st degree A-V block. Left axis deviation. Left bundle branch block.  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    (+)               Obesity,       Psychiatric/Substance Use:  - neg psychiatric ROS     Infectious Disease:  - neg infectious disease ROS     Malignancy:  - neg malignancy ROS     Other:  - neg other ROS          Component      Latest Ref Rng 9/29/2023  2:15 AM   Metanephrine Urine per Volume      ug/L 54    Normetanephrine Urine per Volume      ug/L 343    Metanephrine, Urine - Per 24H      36 - 229 ug/d 76    Metanephr 24 H ur/cr      0 - 300 ug/g CRT 68    Normetanephrine 24 Hr/Random  "Urine      95 - 650 ug/d 480    Normetaneph 24H ur/cr      0 - 400 ug/g  (H)    Metanephrine 24 Hr/Random Urine Interpretation See Note    Creatinine Urine/Volume      mg/dL 80    Creatinine Urine/24hr      500 - 1400 mg/d 1120      Physical Exam    Airway        Mallampati: II   TM distance: > 3 FB   Neck ROM: full   Mouth opening: > 3 cm    Respiratory Devices and Support         Dental       (+) Completely normal teeth      Cardiovascular          Rhythm and rate: regular and normal (-) no murmur    Pulmonary           breath sounds clear to auscultation           OUTSIDE LABS:  CBC:   Lab Results   Component Value Date    WBC 9.6 01/04/2024    WBC 8.8 01/12/2023    HGB 15.2 01/04/2024    HGB 14.8 01/12/2023    HCT 45.8 01/04/2024    HCT 45.2 01/12/2023     01/04/2024     01/12/2023     BMP:   Lab Results   Component Value Date     01/04/2024     (H) 01/12/2023    POTASSIUM 3.8 01/04/2024    POTASSIUM 4.0 01/12/2023    CHLORIDE 103 01/04/2024    CHLORIDE 106 01/12/2023    CO2 31 (H) 01/04/2024    CO2 26 01/12/2023    BUN 16.5 01/04/2024    BUN 17.9 01/12/2023    CR 0.77 01/04/2024    CR 0.9 08/12/2023     (H) 01/04/2024     (H) 01/12/2023     COAGS: No results found for: \"PTT\", \"INR\", \"FIBR\"  POC: No results found for: \"BGM\", \"HCG\", \"HCGS\"  HEPATIC:   Lab Results   Component Value Date    ALBUMIN 4.2 01/04/2024    PROTTOTAL 6.9 01/04/2024    ALT 20 01/04/2024    AST 25 01/04/2024    ALKPHOS 72 01/04/2024    BILITOTAL 0.6 01/04/2024     OTHER:   Lab Results   Component Value Date    EMMANUEL 9.7 01/04/2024    MAG 2.2 07/23/2021    TSH 2.48 05/27/2020       Anesthesia Plan    ASA Status:  3    NPO Status:  NPO Appropriate    Anesthesia Type: General.     - Airway: ETT   Induction: RSI, Intravenous.   Maintenance: TIVA.   Techniques and Equipment:     - Airway: Video-Laryngoscope     - Lines/Monitors: 2nd IV, BIS, Arterial Line     - Drips/Meds: Remifentanil, Phenylephrine " "    Consents    Anesthesia Plan(s) and associated risks, benefits, and realistic alternatives discussed. Questions answered and patient/representative(s) expressed understanding.     - Discussed: Risks, Benefits and Alternatives for BOTH SEDATION and the PROCEDURE were discussed     - Discussed with:  Patient      - Extended Intubation/Ventilatory Support Discussed: No.      - Patient is DNR/DNI Status: No     Use of blood products discussed: Yes.     - Discussed with: Patient.     - Consented: consented to blood products     Postoperative Care    Pain management: IV analgesics, Multi-modal analgesia.   PONV prophylaxis: Ondansetron (or other 5HT-3), Dexamethasone or Solumedrol     Comments:               Pilo More MD    I have reviewed the pertinent notes and labs in the chart from the past 30 days and (re)examined the patient.  Any updates or changes from those notes are reflected in this note.              # Obesity: Estimated body mass index is 35.51 kg/m  as calculated from the following:    Height as of 1/4/24: 1.588 m (5' 2.5\").    Weight as of 1/4/24: 89.5 kg (197 lb 4.8 oz).      "

## 2024-01-22 ENCOUNTER — HOSPITAL ENCOUNTER (INPATIENT)
Facility: CLINIC | Age: 74
LOS: 2 days | Discharge: HOME | DRG: 041 | End: 2024-01-24
Attending: OTOLARYNGOLOGY | Admitting: OTOLARYNGOLOGY
Payer: COMMERCIAL

## 2024-01-22 ENCOUNTER — ANESTHESIA (OUTPATIENT)
Dept: SURGERY | Facility: CLINIC | Age: 74
DRG: 041 | End: 2024-01-22
Payer: COMMERCIAL

## 2024-01-22 DIAGNOSIS — D44.6 CAROTID BODY PARAGANGLIOMA (H): Primary | ICD-10-CM

## 2024-01-22 LAB
ABO/RH(D): NORMAL
ANTIBODY SCREEN: NEGATIVE
BLD PROD TYP BPU: NORMAL
BLOOD COMPONENT TYPE: NORMAL
CODING SYSTEM: NORMAL
CROSSMATCH: NORMAL
GLUCOSE BLDC GLUCOMTR-MCNC: 99 MG/DL (ref 70–99)
HOLD SPECIMEN: NORMAL
SPECIMEN EXPIRATION DATE: NORMAL
UNIT ABO/RH: NORMAL
UNIT NUMBER: NORMAL
UNIT STATUS: NORMAL
UNIT TYPE ISBT: 5100

## 2024-01-22 PROCEDURE — 250N000011 HC RX IP 250 OP 636: Performed by: OTOLARYNGOLOGY

## 2024-01-22 PROCEDURE — 360N000077 HC SURGERY LEVEL 4, PER MIN: Performed by: OTOLARYNGOLOGY

## 2024-01-22 PROCEDURE — 88307 TISSUE EXAM BY PATHOLOGIST: CPT | Mod: 26 | Performed by: PATHOLOGY

## 2024-01-22 PROCEDURE — 86923 COMPATIBILITY TEST ELECTRIC: CPT

## 2024-01-22 PROCEDURE — 250N000025 HC SEVOFLURANE, PER MIN: Performed by: OTOLARYNGOLOGY

## 2024-01-22 PROCEDURE — 710N000010 HC RECOVERY PHASE 1, LEVEL 2, PER MIN: Performed by: OTOLARYNGOLOGY

## 2024-01-22 PROCEDURE — 07BK0ZX EXCISION OF THORACIC DUCT, OPEN APPROACH, DIAGNOSTIC: ICD-10-PCS | Performed by: OTOLARYNGOLOGY

## 2024-01-22 PROCEDURE — 88342 IMHCHEM/IMCYTCHM 1ST ANTB: CPT | Mod: 26 | Performed by: PATHOLOGY

## 2024-01-22 PROCEDURE — 0GB60ZZ EXCISION OF LEFT CAROTID BODY, OPEN APPROACH: ICD-10-PCS | Performed by: OTOLARYNGOLOGY

## 2024-01-22 PROCEDURE — 258N000003 HC RX IP 258 OP 636: Performed by: OTOLARYNGOLOGY

## 2024-01-22 PROCEDURE — 86900 BLOOD TYPING SEROLOGIC ABO: CPT | Performed by: STUDENT IN AN ORGANIZED HEALTH CARE EDUCATION/TRAINING PROGRAM

## 2024-01-22 PROCEDURE — 60600 REMOVE CAROTID BODY LESION: CPT | Mod: GC | Performed by: OTOLARYNGOLOGY

## 2024-01-22 PROCEDURE — 88341 IMHCHEM/IMCYTCHM EA ADD ANTB: CPT | Mod: 26 | Performed by: PATHOLOGY

## 2024-01-22 PROCEDURE — 258N000003 HC RX IP 258 OP 636

## 2024-01-22 PROCEDURE — 250N000011 HC RX IP 250 OP 636

## 2024-01-22 PROCEDURE — 250N000009 HC RX 250

## 2024-01-22 PROCEDURE — 370N000017 HC ANESTHESIA TECHNICAL FEE, PER MIN: Performed by: OTOLARYNGOLOGY

## 2024-01-22 PROCEDURE — 120N000002 HC R&B MED SURG/OB UMMC

## 2024-01-22 PROCEDURE — 88341 IMHCHEM/IMCYTCHM EA ADD ANTB: CPT | Mod: TC | Performed by: OTOLARYNGOLOGY

## 2024-01-22 PROCEDURE — 999N000141 HC STATISTIC PRE-PROCEDURE NURSING ASSESSMENT: Performed by: OTOLARYNGOLOGY

## 2024-01-22 PROCEDURE — 272N000001 HC OR GENERAL SUPPLY STERILE: Performed by: OTOLARYNGOLOGY

## 2024-01-22 PROCEDURE — 36415 COLL VENOUS BLD VENIPUNCTURE: CPT | Performed by: STUDENT IN AN ORGANIZED HEALTH CARE EDUCATION/TRAINING PROGRAM

## 2024-01-22 PROCEDURE — 250N000013 HC RX MED GY IP 250 OP 250 PS 637

## 2024-01-22 RX ORDER — HYDROMORPHONE HCL IN WATER/PF 6 MG/30 ML
0.2 PATIENT CONTROLLED ANALGESIA SYRINGE INTRAVENOUS EVERY 5 MIN PRN
Status: DISCONTINUED | OUTPATIENT
Start: 2024-01-22 | End: 2024-01-22 | Stop reason: HOSPADM

## 2024-01-22 RX ORDER — ONDANSETRON 2 MG/ML
4 INJECTION INTRAMUSCULAR; INTRAVENOUS EVERY 6 HOURS PRN
Status: DISCONTINUED | OUTPATIENT
Start: 2024-01-22 | End: 2024-01-24 | Stop reason: HOSPADM

## 2024-01-22 RX ORDER — GINSENG 100 MG
CAPSULE ORAL EVERY 8 HOURS
Status: ACTIVE | OUTPATIENT
Start: 2024-01-22 | End: 2024-01-23

## 2024-01-22 RX ORDER — FENTANYL CITRATE 50 UG/ML
50 INJECTION, SOLUTION INTRAMUSCULAR; INTRAVENOUS EVERY 5 MIN PRN
Status: DISCONTINUED | OUTPATIENT
Start: 2024-01-22 | End: 2024-01-22 | Stop reason: HOSPADM

## 2024-01-22 RX ORDER — GLYCOPYRROLATE 0.2 MG/ML
INJECTION, SOLUTION INTRAMUSCULAR; INTRAVENOUS PRN
Status: DISCONTINUED | OUTPATIENT
Start: 2024-01-22 | End: 2024-01-22

## 2024-01-22 RX ORDER — METOPROLOL SUCCINATE 50 MG/1
50 TABLET, EXTENDED RELEASE ORAL 2 TIMES DAILY
Status: DISCONTINUED | OUTPATIENT
Start: 2024-01-22 | End: 2024-01-24 | Stop reason: HOSPADM

## 2024-01-22 RX ORDER — ONDANSETRON 4 MG/1
4 TABLET, ORALLY DISINTEGRATING ORAL EVERY 30 MIN PRN
Status: DISCONTINUED | OUTPATIENT
Start: 2024-01-22 | End: 2024-01-22 | Stop reason: HOSPADM

## 2024-01-22 RX ORDER — PROPOFOL 10 MG/ML
INJECTION, EMULSION INTRAVENOUS PRN
Status: DISCONTINUED | OUTPATIENT
Start: 2024-01-22 | End: 2024-01-22

## 2024-01-22 RX ORDER — OXYCODONE HYDROCHLORIDE 10 MG/1
10 TABLET ORAL EVERY 4 HOURS PRN
Status: DISCONTINUED | OUTPATIENT
Start: 2024-01-22 | End: 2024-01-24 | Stop reason: HOSPADM

## 2024-01-22 RX ORDER — CYCLOSPORINE 0.5 MG/ML
1 EMULSION OPHTHALMIC DAILY
Status: DISCONTINUED | OUTPATIENT
Start: 2024-01-22 | End: 2024-01-24 | Stop reason: HOSPADM

## 2024-01-22 RX ORDER — NALOXONE HYDROCHLORIDE 0.4 MG/ML
0.4 INJECTION, SOLUTION INTRAMUSCULAR; INTRAVENOUS; SUBCUTANEOUS
Status: DISCONTINUED | OUTPATIENT
Start: 2024-01-22 | End: 2024-01-24 | Stop reason: HOSPADM

## 2024-01-22 RX ORDER — ONDANSETRON 2 MG/ML
INJECTION INTRAMUSCULAR; INTRAVENOUS PRN
Status: DISCONTINUED | OUTPATIENT
Start: 2024-01-22 | End: 2024-01-22

## 2024-01-22 RX ORDER — ASPIRIN 81 MG/1
81 TABLET ORAL DAILY
Status: DISCONTINUED | OUTPATIENT
Start: 2024-01-22 | End: 2024-01-24 | Stop reason: HOSPADM

## 2024-01-22 RX ORDER — HYDRALAZINE HYDROCHLORIDE 20 MG/ML
2.5-5 INJECTION INTRAMUSCULAR; INTRAVENOUS EVERY 10 MIN PRN
Status: DISCONTINUED | OUTPATIENT
Start: 2024-01-22 | End: 2024-01-22 | Stop reason: HOSPADM

## 2024-01-22 RX ORDER — MINERAL OIL/HYDROPHIL PETROLAT
OINTMENT (GRAM) TOPICAL EVERY 8 HOURS
Status: DISCONTINUED | OUTPATIENT
Start: 2024-01-23 | End: 2024-01-24 | Stop reason: HOSPADM

## 2024-01-22 RX ORDER — SODIUM CHLORIDE, SODIUM LACTATE, POTASSIUM CHLORIDE, CALCIUM CHLORIDE 600; 310; 30; 20 MG/100ML; MG/100ML; MG/100ML; MG/100ML
INJECTION, SOLUTION INTRAVENOUS CONTINUOUS
Status: DISCONTINUED | OUTPATIENT
Start: 2024-01-22 | End: 2024-01-22 | Stop reason: HOSPADM

## 2024-01-22 RX ORDER — OXYCODONE HYDROCHLORIDE 5 MG/1
5 TABLET ORAL
Status: DISCONTINUED | OUTPATIENT
Start: 2024-01-22 | End: 2024-01-22 | Stop reason: HOSPADM

## 2024-01-22 RX ORDER — POLYETHYLENE GLYCOL 3350 17 G/17G
17 POWDER, FOR SOLUTION ORAL DAILY
Status: DISCONTINUED | OUTPATIENT
Start: 2024-01-23 | End: 2024-01-24 | Stop reason: HOSPADM

## 2024-01-22 RX ORDER — OXYCODONE HYDROCHLORIDE 5 MG/1
5 TABLET ORAL EVERY 4 HOURS PRN
Status: DISCONTINUED | OUTPATIENT
Start: 2024-01-22 | End: 2024-01-24 | Stop reason: HOSPADM

## 2024-01-22 RX ORDER — LABETALOL HYDROCHLORIDE 5 MG/ML
10 INJECTION, SOLUTION INTRAVENOUS
Status: DISCONTINUED | OUTPATIENT
Start: 2024-01-22 | End: 2024-01-22 | Stop reason: HOSPADM

## 2024-01-22 RX ORDER — HYDROMORPHONE HCL IN WATER/PF 6 MG/30 ML
0.4 PATIENT CONTROLLED ANALGESIA SYRINGE INTRAVENOUS EVERY 5 MIN PRN
Status: DISCONTINUED | OUTPATIENT
Start: 2024-01-22 | End: 2024-01-22 | Stop reason: HOSPADM

## 2024-01-22 RX ORDER — LIDOCAINE 40 MG/G
CREAM TOPICAL
Status: DISCONTINUED | OUTPATIENT
Start: 2024-01-22 | End: 2024-01-24 | Stop reason: HOSPADM

## 2024-01-22 RX ORDER — SUMATRIPTAN 50 MG/1
50 TABLET, FILM COATED ORAL
Status: DISCONTINUED | OUTPATIENT
Start: 2024-01-22 | End: 2024-01-24 | Stop reason: HOSPADM

## 2024-01-22 RX ORDER — EPHEDRINE SULFATE 50 MG/ML
INJECTION, SOLUTION INTRAMUSCULAR; INTRAVENOUS; SUBCUTANEOUS PRN
Status: DISCONTINUED | OUTPATIENT
Start: 2024-01-22 | End: 2024-01-22

## 2024-01-22 RX ORDER — ONDANSETRON 2 MG/ML
4 INJECTION INTRAMUSCULAR; INTRAVENOUS EVERY 30 MIN PRN
Status: DISCONTINUED | OUTPATIENT
Start: 2024-01-22 | End: 2024-01-22 | Stop reason: HOSPADM

## 2024-01-22 RX ORDER — LIDOCAINE HYDROCHLORIDE 20 MG/ML
INJECTION, SOLUTION INFILTRATION; PERINEURAL PRN
Status: DISCONTINUED | OUTPATIENT
Start: 2024-01-22 | End: 2024-01-22

## 2024-01-22 RX ORDER — FENTANYL CITRATE 50 UG/ML
INJECTION, SOLUTION INTRAMUSCULAR; INTRAVENOUS PRN
Status: DISCONTINUED | OUTPATIENT
Start: 2024-01-22 | End: 2024-01-22

## 2024-01-22 RX ORDER — LIDOCAINE 40 MG/G
CREAM TOPICAL
Status: DISCONTINUED | OUTPATIENT
Start: 2024-01-22 | End: 2024-01-22 | Stop reason: HOSPADM

## 2024-01-22 RX ORDER — SODIUM CHLORIDE, SODIUM LACTATE, POTASSIUM CHLORIDE, CALCIUM CHLORIDE 600; 310; 30; 20 MG/100ML; MG/100ML; MG/100ML; MG/100ML
INJECTION, SOLUTION INTRAVENOUS CONTINUOUS PRN
Status: DISCONTINUED | OUTPATIENT
Start: 2024-01-22 | End: 2024-01-22

## 2024-01-22 RX ORDER — ONDANSETRON 4 MG/1
4 TABLET, ORALLY DISINTEGRATING ORAL EVERY 6 HOURS PRN
Status: DISCONTINUED | OUTPATIENT
Start: 2024-01-22 | End: 2024-01-24 | Stop reason: HOSPADM

## 2024-01-22 RX ORDER — OXYCODONE HYDROCHLORIDE 10 MG/1
10 TABLET ORAL
Status: DISCONTINUED | OUTPATIENT
Start: 2024-01-22 | End: 2024-01-22 | Stop reason: HOSPADM

## 2024-01-22 RX ORDER — FENTANYL CITRATE 50 UG/ML
25 INJECTION, SOLUTION INTRAMUSCULAR; INTRAVENOUS EVERY 5 MIN PRN
Status: DISCONTINUED | OUTPATIENT
Start: 2024-01-22 | End: 2024-01-22 | Stop reason: HOSPADM

## 2024-01-22 RX ORDER — NALOXONE HYDROCHLORIDE 0.4 MG/ML
0.2 INJECTION, SOLUTION INTRAMUSCULAR; INTRAVENOUS; SUBCUTANEOUS
Status: DISCONTINUED | OUTPATIENT
Start: 2024-01-22 | End: 2024-01-24 | Stop reason: HOSPADM

## 2024-01-22 RX ORDER — CEFAZOLIN SODIUM/WATER 2 G/20 ML
SYRINGE (ML) INTRAVENOUS PRN
Status: DISCONTINUED | OUTPATIENT
Start: 2024-01-22 | End: 2024-01-22

## 2024-01-22 RX ORDER — PHENYLEPHRINE HCL IN 0.9% NACL 50MG/250ML
.1-6 PLASTIC BAG, INJECTION (ML) INTRAVENOUS CONTINUOUS
Status: DISCONTINUED | OUTPATIENT
Start: 2024-01-22 | End: 2024-01-22 | Stop reason: HOSPADM

## 2024-01-22 RX ORDER — FUROSEMIDE 40 MG
40 TABLET ORAL DAILY PRN
Status: DISCONTINUED | OUTPATIENT
Start: 2024-01-22 | End: 2024-01-24 | Stop reason: HOSPADM

## 2024-01-22 RX ORDER — PROCHLORPERAZINE MALEATE 5 MG
5 TABLET ORAL EVERY 6 HOURS PRN
Status: DISCONTINUED | OUTPATIENT
Start: 2024-01-22 | End: 2024-01-24 | Stop reason: HOSPADM

## 2024-01-22 RX ORDER — AMOXICILLIN 250 MG
1 CAPSULE ORAL 2 TIMES DAILY
Status: DISCONTINUED | OUTPATIENT
Start: 2024-01-22 | End: 2024-01-24 | Stop reason: HOSPADM

## 2024-01-22 RX ORDER — BISACODYL 10 MG
10 SUPPOSITORY, RECTAL RECTAL DAILY PRN
Status: DISCONTINUED | OUTPATIENT
Start: 2024-01-22 | End: 2024-01-24 | Stop reason: HOSPADM

## 2024-01-22 RX ADMIN — REMIFENTANIL HYDROCHLORIDE 0.05 MCG/KG/MIN: 1 INJECTION, POWDER, LYOPHILIZED, FOR SOLUTION INTRAVENOUS at 07:52

## 2024-01-22 RX ADMIN — SODIUM CHLORIDE, POTASSIUM CHLORIDE, SODIUM LACTATE AND CALCIUM CHLORIDE: 600; 310; 30; 20 INJECTION, SOLUTION INTRAVENOUS at 07:30

## 2024-01-22 RX ADMIN — PHENYLEPHRINE HYDROCHLORIDE 50 MCG: 10 INJECTION INTRAVENOUS at 08:22

## 2024-01-22 RX ADMIN — PHENYLEPHRINE HYDROCHLORIDE 50 MCG: 10 INJECTION INTRAVENOUS at 09:33

## 2024-01-22 RX ADMIN — LIDOCAINE HYDROCHLORIDE 100 MG: 20 INJECTION, SOLUTION INFILTRATION; PERINEURAL at 07:38

## 2024-01-22 RX ADMIN — PROPOFOL 150 MG: 10 INJECTION, EMULSION INTRAVENOUS at 07:38

## 2024-01-22 RX ADMIN — OXYCODONE HYDROCHLORIDE 5 MG: 5 TABLET ORAL at 11:51

## 2024-01-22 RX ADMIN — PHENYLEPHRINE HYDROCHLORIDE 50 MCG: 10 INJECTION INTRAVENOUS at 08:31

## 2024-01-22 RX ADMIN — SENNOSIDES AND DOCUSATE SODIUM 1 TABLET: 8.6; 5 TABLET ORAL at 11:52

## 2024-01-22 RX ADMIN — FENTANYL CITRATE 25 MCG: 50 INJECTION, SOLUTION INTRAMUSCULAR; INTRAVENOUS at 10:23

## 2024-01-22 RX ADMIN — Medication 0.2 MCG/KG/MIN: at 08:25

## 2024-01-22 RX ADMIN — Medication 0.8 MCG/KG/MIN: at 07:44

## 2024-01-22 RX ADMIN — EPHEDRINE SULFATE 5 MG: 5 INJECTION INTRAVENOUS at 08:01

## 2024-01-22 RX ADMIN — SUCCINYLCHOLINE CHLORIDE 160 MG: 20 INJECTION, SOLUTION INTRAMUSCULAR; INTRAVENOUS; PARENTERAL at 07:38

## 2024-01-22 RX ADMIN — Medication 2 G: at 07:54

## 2024-01-22 RX ADMIN — PHENYLEPHRINE HYDROCHLORIDE 50 MCG: 10 INJECTION INTRAVENOUS at 08:35

## 2024-01-22 RX ADMIN — BACITRACIN: 500 OINTMENT TOPICAL at 13:26

## 2024-01-22 RX ADMIN — PHENYLEPHRINE HYDROCHLORIDE 50 MCG: 10 INJECTION INTRAVENOUS at 09:24

## 2024-01-22 RX ADMIN — PHENYLEPHRINE HYDROCHLORIDE 50 MCG: 10 INJECTION INTRAVENOUS at 07:52

## 2024-01-22 RX ADMIN — REMIFENTANIL HYDROCHLORIDE 0.1 MCG/KG/MIN: 1 INJECTION, POWDER, LYOPHILIZED, FOR SOLUTION INTRAVENOUS at 07:45

## 2024-01-22 RX ADMIN — SODIUM CHLORIDE, POTASSIUM CHLORIDE, SODIUM LACTATE AND CALCIUM CHLORIDE: 600; 310; 30; 20 INJECTION, SOLUTION INTRAVENOUS at 10:30

## 2024-01-22 RX ADMIN — BACITRACIN: 500 OINTMENT TOPICAL at 23:58

## 2024-01-22 RX ADMIN — FENTANYL CITRATE 50 MCG: 50 INJECTION INTRAMUSCULAR; INTRAVENOUS at 07:38

## 2024-01-22 RX ADMIN — FENTANYL CITRATE 25 MCG: 50 INJECTION INTRAMUSCULAR; INTRAVENOUS at 08:17

## 2024-01-22 RX ADMIN — EPHEDRINE SULFATE 5 MG: 5 INJECTION INTRAVENOUS at 09:12

## 2024-01-22 RX ADMIN — PHENYLEPHRINE HYDROCHLORIDE 50 MCG: 10 INJECTION INTRAVENOUS at 08:39

## 2024-01-22 RX ADMIN — METOPROLOL SUCCINATE 50 MG: 50 TABLET, EXTENDED RELEASE ORAL at 20:25

## 2024-01-22 RX ADMIN — CYCLOSPORINE 1 DROP: 0.5 EMULSION OPHTHALMIC at 13:31

## 2024-01-22 RX ADMIN — ONDANSETRON 4 MG: 2 INJECTION INTRAMUSCULAR; INTRAVENOUS at 09:34

## 2024-01-22 RX ADMIN — FENTANYL CITRATE 25 MCG: 50 INJECTION, SOLUTION INTRAMUSCULAR; INTRAVENOUS at 10:42

## 2024-01-22 RX ADMIN — SENNOSIDES AND DOCUSATE SODIUM 1 TABLET: 8.6; 5 TABLET ORAL at 20:25

## 2024-01-22 RX ADMIN — PHENYLEPHRINE HYDROCHLORIDE 50 MCG: 10 INJECTION INTRAVENOUS at 08:49

## 2024-01-22 RX ADMIN — PROPOFOL 50 MG: 10 INJECTION, EMULSION INTRAVENOUS at 08:05

## 2024-01-22 RX ADMIN — PHENYLEPHRINE HYDROCHLORIDE 50 MCG: 10 INJECTION INTRAVENOUS at 09:19

## 2024-01-22 RX ADMIN — PHENYLEPHRINE HYDROCHLORIDE 50 MCG: 10 INJECTION INTRAVENOUS at 09:42

## 2024-01-22 RX ADMIN — GLYCOPYRROLATE 0.2 MG: 0.2 INJECTION, SOLUTION INTRAMUSCULAR; INTRAVENOUS at 07:52

## 2024-01-22 ASSESSMENT — ACTIVITIES OF DAILY LIVING (ADL)
ADLS_ACUITY_SCORE: 20
ADLS_ACUITY_SCORE: 26
ADLS_ACUITY_SCORE: 20
ADLS_ACUITY_SCORE: 26
ADLS_ACUITY_SCORE: 26

## 2024-01-22 NOTE — ANESTHESIA PROCEDURE NOTES
Airway         Procedure Start/Stop Times: 1/22/2024 7:42 AM  Staff -        Anesthesiologist:  Helder Tan MD       Resident/Fellow: Pilo More MD       Performed By: anesthesiologist and residentIndications and Patient Condition       Indications for airway management: kristy-procedural and airway protection       Induction type:intravenous       Mask difficulty assessment: 2 - vent by mask + OA or adjuvant +/- NMBA    Final Airway Details       Final airway type: endotracheal airway       Successful airway: ETT - single  Endotracheal Airway Details        ETT size (mm): 7.0       Cuffed: yes       Successful intubation technique: direct laryngoscopy       DL Blade Type: MAC 3       Grade View of Cords: 2       Adjucts: stylet       Position: Right       Measured from: gums/teeth       Secured at (cm): 21    Post intubation assessment        Placement verified by: capnometry, equal breath sounds and chest rise        Number of attempts at approach: 1       Secured with: tape       Ease of procedure: easy       Dentition: Intact and Unchanged    Medication(s) Administered   Medication Administration Time: 1/22/2024 7:42 AM

## 2024-01-22 NOTE — OP NOTE
DATE OF SERVICE 1/22/2024       STAFF SURGEON:  Dawson Orr MD       RESIDENT SURGEON:   MD Gayle Estrada MD      PREOPERATIVE DIAGNOSES:  Left Carotid Body Mass      POSTOPERATIVE DIAGNOSIS:  Same.       PROCEDURES PERFORMED:   1.  Exicision of Left Carotid body mass       COMPLICATIONS:  None.       ESTIMATED BLOOD LOSS:  20 mL.       SPECIMENS:   Left Level 2 and 3 lymph nodes  Left carotid body tumor        ANESTHESIA:  General     OPERATIVE FINDINGS:    Approximately 1.5cm carotid body tumor splaying internal and external carotids at the bifurcation. Mass was not fully encasing either ICA or ECA.    Superior laryngeal nerve deep to mass and easily resected off of mass  Vagus, hypoglossal, spinal accessory, and superior laryngeal nerve identified and preserved             INDICATIONS FOR PROCEDURE: Romy Izquierdo is a 73 year old female with a history of  Left sided carotid body mass found incidentally while working up submandibular swelling. Her normetanephrine in the 24-hour urine sample was slightly elevated but the other measurements were all in the normal range. We discussed observation vs resection given the smaller size of the mass but patient wanted to proceed with surgical removal. The benefits, risk, and alternatives were outlined including but not limited to vascular injury leading to blood loss requiring transfusion or ischemic injury to the brain, injury to the vagus nerve, hypoglossal, spinal accessory, and superior laryngeal nerve. She agreed with proceeding with procedure..        DESCRIPTION OF PROCEDURE: After properly identifying the patient and obtaining signed informed consent, the patient was brought to the operating room , laid supine, orotracheally intubated. The head of the bed was turned 180 degrees, patient tucked, borges inserted, and arterial line placed. A shoulder roll was placed and surgical incision marked out approximately 2 breadths beneath the mandible  in a resting skin tension line. Incision was injected with 5mL of 1:100,000 epinephrine. A timeout was carried out immediately prior to the procedure to confirm the identity of the patient and correctness of the procedure. Surgical site was prepped in draped in standard fashion.     #15 blade used to make skin incision down to subcutaneous tissue. It was noted there there was a small hematoma overlying the external jugular vein therefore during use of the #15 blade while identifying the vessel the EJ was cut then clipped. Monopolar cautery was used to come through platysma then raise subplatysma flaps inferiorly to the cricoid cartilage and superiorly to the mandible. The anterior border of the sternocleidomastoid muscle was opened to the floor of the neck. Dissection continued superiorly towards the tendon of the SCM where the spinal accessory was identified and preserved. We then guided our dissection anteriorly to identifying the inferior border of the submandibular gland. The common facial vein was identified and ligated. The submandibular gland was retracted superiorly and blunt dissection was used to find our digastric muscle and hypoglossal nerve. With our superior and lateral dissection identified we removed level 2 and 3 lymph off of the jugular vein. Several feeder vessels from the IJV were clipped. This was a partial lymph node resection as we did not go to the floor of the neck for deep lymph node removal. Left level 2 and 3 lymph nodes were sent for pathological evaluation.      The  hypoglossal nerve was again identified as it course superficial to the carotid then deep to the internal jugular vein. This nerve was protected as we began to isolate the internal jugular vein. The IJV was isolated and unbiblical ties were looped around the superior and inferior margins of the vein. The blood was stripped out of the vein and the unbiblical ties used to retract the jugular vein laterally to expose our  carotid artery. The carotid artery was traced superiorly to the bifurcation. Medium vessel loops were applied around the external and internal jugular veins separately. We took a moment to identify superior laryngeal nerve. We identified it at its entry point in the thyroid hyoid membrane and traced it laterally. It was bluntly dissected free from the mass. Dissection of the mass began off of the carotid bifurcation. The mass was fully dissected using a combination of bipolar cautery, cold schilling and blunt dissection. Once the mas was fully mobilized it was sent for pathological evaluation. Vessel loops were removed from the ECA and ICA. Umbilical tie was removed from the IJV. The surgical bed was irrigated. Valsalva was performed to check for hemostasis. Final hemostasis obtained. A 7 flat derek blake drain was placed. Incision was closed in a multilayered fashion with 3-0 Vicrly and 4-0 Nylon.     This concluded the procedure. There were no complications.      Dr. Dawson Orr was present for the entire case.    Taryn Ruiz MD  Otolaryngology Head and Neck Surgery

## 2024-01-22 NOTE — ANESTHESIA CARE TRANSFER NOTE
Patient: Romy Izquierdo    Procedure: Procedure(s):  excision of left neck carotid body tumor  **Latex Allergy**       Diagnosis: Carotid body tumor (H) [D44.6]  Diagnosis Additional Information: No value filed.    Anesthesia Type:   General     Note:    Oropharynx: oropharynx clear of all foreign objects and spontaneously breathing  Level of Consciousness: awake  Oxygen Supplementation: face mask  Level of Supplemental Oxygen (L/min / FiO2): 6  Independent Airway: airway patency satisfactory and stable  Dentition: dentition unchanged  Vital Signs Stable: post-procedure vital signs reviewed and stable  Report to RN Given: handoff report given  Patient transferred to: PACU    Handoff Report: Identifed the Patient, Identified the Reponsible Provider, Reviewed the pertinent medical history, Discussed the surgical course, Reviewed Intra-OP anesthesia mangement and issues during anesthesia, Set expectations for post-procedure period and Allowed opportunity for questions and acknowledgement of understanding      Vitals:  Vitals Value Taken Time   /76 01/22/24 1009   Temp 36.7  C (98  F) 01/22/24 1005   Pulse 67 01/22/24 1014   Resp 16 01/22/24 1014   SpO2 94 % 01/22/24 1014   Vitals shown include unfiled device data.    Electronically Signed By: Pilo More MD  January 22, 2024  10:15 AM

## 2024-01-22 NOTE — BRIEF OP NOTE
[facility]    Brief Operative Note    Pre-operative diagnosis: Carotid body tumor (H) [D44.6]  Post-operative diagnosis Same as pre-operative diagnosis    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  **Latex Allergy**, N/A - Neck    Surgeon: Surgeon(s) and Role:     * Dawson Orr MD - Primary     * Taryn Ruiz MD - Resident - Assisting  Anesthesia: General   Estimated Blood Loss: Less than 50 ml    Drains: Awais-Jensen  Specimens:   ID Type Source Tests Collected by Time Destination   1 : left level 2&3 nodes Tissue Lymph Node(s) SURGICAL PATHOLOGY EXAM Dawson Orr MD 1/22/2024  8:42 AM    2 : left carotid body tumor Tissue Neck SURGICAL PATHOLOGY EXAM Dawson Orr MD 1/22/2024  9:27 AM      Findings:   1.5cm Left carotid body mass splaying internal and external carotid artery .  Complications: None.  Implants: * No implants in log *      ENT Brief:   - PACU Assessment: CN 9, 10, 11, 12. Marginal mandibular nerve weak.     Taryn Ruiz MD

## 2024-01-22 NOTE — PHARMACY-ADMISSION MEDICATION HISTORY
Pharmacist Admission Medication History    Admission medication history is complete. The information provided in this note is only as accurate as the sources available at the time of the update.    Information Source(s): Patient, Family member, and CareEverywhere/SureScripts via in-person    Pertinent Information: Patient was able to recall all medications and last doses prior to admission for planned surgery. Minor adjustments were made to list eo ensure directions matched how patient takes at home    Changes made to PTA medication list:  Added: None  Deleted: None  Changed:   Updated cyclosporine and Ocean spray instructions to include frequency and correct route      Allergies reviewed with patient and updates made in EHR: yes    Medication History Completed By: Yuri Belcher [facility] 1/22/2024 2:39 PM    PTA Med List   Medication Sig Note Last Dose    aspirin (ASA) 81 MG EC tablet Take 1 tablet (81 mg) by mouth daily 1/19/2024: On hold for surgery 1/15/2024    Biotin 10 MG CAPS Take 1 capsule by mouth daily  Past Month    Cetaphil Moisturizing (CETAPHIL) external lotion Apply 1 Application topically daily  1/21/2024    cycloSPORINE (RESTASIS) 0.05 % ophthalmic emulsion Place 1 drop into both eyes 2 times daily  1/22/2024 at AM    furosemide (LASIX) 20 MG tablet Take 2 tablets (40 mg) by mouth daily as needed (fluid retention)  1/13/2024    metoprolol succinate ER (TOPROL XL) 50 MG 24 hr tablet Take 1 tablet (50 mg) by mouth 2 times daily 1/19/2024: Take DOS 1/22/2024 at AM    multivitamin  with lutein (OCUVITE WITH LUTEIN) CAPS per capsule Take 1 capsule by mouth daily  1/13/2024    multivitamin (CENTRUM SILVER) tablet Take 2 tablets by mouth daily  1/13/2024    Omega-3 Fatty Acids (RA FISH OIL) 1000 MG CAPS 2 capsules in the morning and 1 capsule at night  1/13/2024    psyllium (METAMUCIL/KONSYL) Packet Take 1 packet by mouth daily  Past Week    sodium chloride (OCEAN) 0.65 % nasal spray Spray 1 spray into both  nostrils daily as needed for congestion  1/21/2024    SUMAtriptan (IMITREX) 50 MG tablet Take 1 tablet (50 mg) by mouth as needed for migraine  More than a month    Turmeric Curcumin 500 MG CAPS Take 1 tablet by mouth daily  1/13/2024

## 2024-01-22 NOTE — ANESTHESIA PROCEDURE NOTES
Arterial Line Procedure Note    Pre-Procedure   Staff -        Anesthesiologist:  Helder Tan MD       Performed By: anesthesiologist       Location: OR       Pre-Anesthestic Checklist: patient identified, IV checked, risks and benefits discussed, informed consent, monitors and equipment checked, pre-op evaluation and at physician/surgeon's request  Timeout:       Correct Patient: Yes        Correct Procedure: Yes        Correct Site: Yes        Correct Position: Yes   Line Placement:   This line was placed Post Induction  Procedure   Procedure: arterial line       Diagnosis: hemodynamic monitoring.       Laterality: left       Insertion Site: radial.  Sterile Prep        Standard elements of sterile barrier followed       Skin prep: Chloraprep  Insertion/Injection        Technique: ultrasound guided        1. Ultrasound was used to evaluate the access site.       2. Artery evaluated via ultrasound for patency/adequacy.       3. Using real-time ultrasound the needle/catheter was observed entering the artery/vein.       Catheter Type/Size: 20 G, 12 cm  Narrative        Tegaderm dressing used.       Complications: None apparent,        Arterial waveform: Yes        IBP within 10% of NIBP: Yes

## 2024-01-22 NOTE — ANESTHESIA POSTPROCEDURE EVALUATION
Patient: Romy Izquierdo    Procedure: Procedure(s):  excision of left neck carotid body tumor  **Latex Allergy**       Anesthesia Type:  General    Note:  Disposition: Admission; Inpatient   Postop Pain Control: Uneventful            Sign Out: Well controlled pain   PONV: No   Neuro/Psych: Uneventful            Sign Out: Acceptable/Baseline neuro status   Airway/Respiratory: Uneventful            Sign Out: Acceptable/Baseline resp. status   CV/Hemodynamics: Uneventful            Sign Out: Acceptable CV status; No obvious hypovolemia; No obvious fluid overload   Other NRE: NONE   DID A NON-ROUTINE EVENT OCCUR? No           Last vitals:  Vitals Value Taken Time   /76 01/22/24 1015   Temp 36.7  C (98  F) 01/22/24 1005   Pulse 66 01/22/24 1023   Resp 24 01/22/24 1023   SpO2 94 % 01/22/24 1023   Vitals shown include unfiled device data.    Electronically Signed By: Karsten Molina MD  January 22, 2024  10:24 AM

## 2024-01-22 NOTE — PLAN OF CARE
[id number]:  Pt POD#0 excision of L carotid body mass, vs ex HTN, neuros include: generalized weakness. PIV SL, clear liquid diet, voids spont, LBM this morning, up ad jonh w/assistance disconnecting from equipment. Pt on clear liquid diet w/good PO intake, L neck incision KALLIE with scant bloody drainage. Pt has x1 INDIA with bloody output, reporting incisional pain and posterior head pain, PRN Oxy provided with relief. RN initiated INDIA drain education, please continue to practice with pt. Continue to care per orders.

## 2024-01-23 ENCOUNTER — APPOINTMENT (OUTPATIENT)
Dept: OCCUPATIONAL THERAPY | Facility: CLINIC | Age: 74
DRG: 041 | End: 2024-01-23
Attending: OTOLARYNGOLOGY
Payer: COMMERCIAL

## 2024-01-23 LAB
ANION GAP SERPL CALCULATED.3IONS-SCNC: 8 MMOL/L (ref 7–15)
BUN SERPL-MCNC: 19.1 MG/DL (ref 8–23)
CALCIUM SERPL-MCNC: 9.2 MG/DL (ref 8.8–10.2)
CHLORIDE SERPL-SCNC: 103 MMOL/L (ref 98–107)
CREAT SERPL-MCNC: 0.86 MG/DL (ref 0.51–0.95)
DEPRECATED HCO3 PLAS-SCNC: 28 MMOL/L (ref 22–29)
EGFRCR SERPLBLD CKD-EPI 2021: 72 ML/MIN/1.73M2
ERYTHROCYTE [DISTWIDTH] IN BLOOD BY AUTOMATED COUNT: 13.7 % (ref 10–15)
GLUCOSE BLDC GLUCOMTR-MCNC: 100 MG/DL (ref 70–99)
GLUCOSE SERPL-MCNC: 102 MG/DL (ref 70–99)
HCT VFR BLD AUTO: 41.4 % (ref 35–47)
HGB BLD-MCNC: 13.7 G/DL (ref 11.7–15.7)
MCH RBC QN AUTO: 29.1 PG (ref 26.5–33)
MCHC RBC AUTO-ENTMCNC: 33.1 G/DL (ref 31.5–36.5)
MCV RBC AUTO: 88 FL (ref 78–100)
PLATELET # BLD AUTO: 175 10E3/UL (ref 150–450)
POTASSIUM SERPL-SCNC: 4 MMOL/L (ref 3.4–5.3)
RBC # BLD AUTO: 4.71 10E6/UL (ref 3.8–5.2)
SODIUM SERPL-SCNC: 139 MMOL/L (ref 135–145)
WBC # BLD AUTO: 12.1 10E3/UL (ref 4–11)

## 2024-01-23 PROCEDURE — 97165 OT EVAL LOW COMPLEX 30 MIN: CPT | Mod: GO

## 2024-01-23 PROCEDURE — 97530 THERAPEUTIC ACTIVITIES: CPT | Mod: GO

## 2024-01-23 PROCEDURE — 250N000013 HC RX MED GY IP 250 OP 250 PS 637

## 2024-01-23 PROCEDURE — 97110 THERAPEUTIC EXERCISES: CPT | Mod: GO

## 2024-01-23 PROCEDURE — 120N000002 HC R&B MED SURG/OB UMMC

## 2024-01-23 PROCEDURE — 80048 BASIC METABOLIC PNL TOTAL CA: CPT

## 2024-01-23 PROCEDURE — 36415 COLL VENOUS BLD VENIPUNCTURE: CPT

## 2024-01-23 PROCEDURE — 85027 COMPLETE CBC AUTOMATED: CPT

## 2024-01-23 RX ADMIN — CYCLOSPORINE 1 DROP: 0.5 EMULSION OPHTHALMIC at 08:16

## 2024-01-23 RX ADMIN — METOPROLOL SUCCINATE 50 MG: 50 TABLET, EXTENDED RELEASE ORAL at 08:14

## 2024-01-23 RX ADMIN — SENNOSIDES AND DOCUSATE SODIUM 1 TABLET: 8.6; 5 TABLET ORAL at 08:14

## 2024-01-23 RX ADMIN — METOPROLOL SUCCINATE 50 MG: 50 TABLET, EXTENDED RELEASE ORAL at 19:53

## 2024-01-23 RX ADMIN — SENNOSIDES AND DOCUSATE SODIUM 1 TABLET: 8.6; 5 TABLET ORAL at 19:53

## 2024-01-23 RX ADMIN — POLYETHYLENE GLYCOL 3350 17 G: 17 POWDER, FOR SOLUTION ORAL at 08:13

## 2024-01-23 RX ADMIN — WHITE PETROLATUM: 1.75 OINTMENT TOPICAL at 23:58

## 2024-01-23 RX ADMIN — WHITE PETROLATUM: 1.75 OINTMENT TOPICAL at 15:48

## 2024-01-23 RX ADMIN — WHITE PETROLATUM: 1.75 OINTMENT TOPICAL at 11:31

## 2024-01-23 RX ADMIN — BACITRACIN: 500 OINTMENT TOPICAL at 08:16

## 2024-01-23 ASSESSMENT — ACTIVITIES OF DAILY LIVING (ADL)
ADLS_ACUITY_SCORE: 24
IADL_COMMENTS: IND
ADLS_ACUITY_SCORE: 24
ADLS_ACUITY_SCORE: 26

## 2024-01-23 NOTE — PROGRESS NOTES
"   01/23/24 1000   Appointment Info   Signing Clinician's Name / Credentials (OT) Julianne Cedeno, OTD, OTR/L   Living Environment   People in Home spouse   Current Living Arrangements house   Home Accessibility stairs to enter home;stairs within home   Number of Stairs, Main Entrance 8   Number of Stairs, Within Home, Primary greater than 10 stairs   Transportation Anticipated car, drives self;family or friend will provide   Living Environment Comments pt plans to d/c to home in [place] with  (S[place]). pt has 8 AMALIA with bedroom on 2nd level, has tub shower with GB's. Pt has a pet cat named \"honey\".   Self-Care   Usual Activity Tolerance good   Current Activity Tolerance good   Regular Exercise Yes   Activity/Exercise Type walking   Exercise Amount/Frequency daily   Equipment Currently Used at Home none   Fall history within last six months no   Activity/Exercise/Self-Care Comment pt ambulates, does arm bike and stationary bike, engages in exercises and yoga daily. IND with ADLs, no AD or falls. retired teacher.   Instrumental Activities of Daily Living (IADL)   IADL Comments IND   General Information   Onset of Illness/Injury or Date of Surgery 01/22/24   Referring Physician Gayle Jay MD   Additional Occupational Profile Info/Pertinent History of Current Problem \"Romy Izquierdo is a 73 year old female with a past medical history of HTN, Hypertrophic cardiomyopathy, paroxymal A. Fib, migraines, and Left sided carotid body tumor s/p transcervical resection 1/22/2024.\"   Existing Precautions/Restrictions other (see comments)  (neck dissection)   Cognitive Status Examination   Orientation Status orientation to person, place and time   Affect/Mental Status (Cognitive) WFL   Follows Commands WFL   Visual Perception   Visual Impairment/Limitations WFL   Sensory   Sensory Comments no acute change   Range of Motion Comprehensive   Comment, General Range of Motion BUE ROM limited by surgical intervention "   Strength Comprehensive (MMT)   Comment, General Manual Muscle Testing (MMT) Assessment BUE strength limited by surgical precautions   Coordination   Upper Extremity Coordination No deficits were identified   Bed Mobility   Comment (Bed Mobility) not observed, pt sitting EOB upon arrival   Transfers   Transfer Comments STS with SBA   Activities of Daily Living   BADL Assessment/Intervention upper body dressing;lower body dressing;toileting   Upper Body Dressing Assessment/Training   Comment, (Upper Body Dressing) anticipate min A - SBA per clinical judgement   Lower Body Dressing Assessment/Training   Comment, (Lower Body Dressing) anticipate SBA per clinical judgement   Toileting   Comment, (Toileting) anticipate SBA per clinical judgement   Clinical Impression   Criteria for Skilled Therapeutic Interventions Met (OT) Yes, treatment indicated   OT Diagnosis dec IND with I/ADLs, post-surgical precautions and exercises   OT Problem List-Impairments impacting ADL problems related to;activity tolerance impaired;mobility;range of motion (ROM);strength;pain;post-surgical precautions   Assessment of Occupational Performance 1-3 Performance Deficits   Identified Performance Deficits bathing, dressing, toileting   Planned Therapy Interventions (OT) ADL retraining;IADL retraining;bed mobility training;ROM;strengthening;stretching;transfer training;home program guidelines;progressive activity/exercise;risk factor education   Clinical Decision Making Complexity (OT) problem focused assessment/low complexity   Risk & Benefits of therapy have been explained evaluation/treatment results reviewed;care plan/treatment goals reviewed;risks/benefits reviewed;current/potential barriers reviewed;participants voiced agreement with care plan;participants included;patient   OT Total Evaluation Time   OT Eval, Low Complexity Minutes (19655) 8   OT Goals   Therapy Frequency (OT) Daily   OT Predicted Duration/Target Date for Goal Attainment  01/30/24   OT Goals Hygiene/Grooming;Upper Body Dressing;Lower Body Dressing;Toilet Transfer/Toileting;OT Goal 1;OT Goal 2   OT: Hygiene/Grooming modified independent   OT: Upper Body Dressing Modified independent   OT: Lower Body Dressing Modified independent   OT: Toilet Transfer/Toileting Modified independent   OT: Goal 1 pt will demo IND with neck dissection exercises   OT: Goal 2 pt will demo IND with 8+ stairs to simulate home setup   OT Discharge Planning   OT Plan FBD, tub transfer, review precs, ambulation   OT Discharge Recommendation (DC Rec) home with assist   OT Rationale for DC Rec pt mobilizing well & demos good safety, anticipate safe to d/c home with assist.   OT Brief overview of current status SBA   Total Session Time   Total Session Time (sum of timed and untimed services) 8

## 2024-01-23 NOTE — PROGRESS NOTES
Brief ENT Note    I was FYI paged for bleeding around INDIA site. On arrival, INDIA site hemostatic with aquaphor plugging hole surrounding drain tubing. Expected sanguineous output in INDIA, holding suction. Incision site intact with stable ecchymosis, soft and flat to palpation.     A/P: Stable surgical site, continue current plan of care.     Yue Montano MD on 1/22/2024 at 10:37 PM

## 2024-01-23 NOTE — PLAN OF CARE
Goal Outcome Evaluation:      Plan of Care Reviewed With: patient    Overall Patient Progress: improvingOverall Patient Progress: improving    Outcome Evaluation: Pt able to walk through INDIA process    Status: Pt POD#0 excision of L carotid body mass 1/22  Vitals: VSS on 1-2L NC while sleeping and slight HTN  Neuros: AOx4, generalized weakness, strengths 5/5  IV: PIV SL  Resp/trach: 1-2L NC, attempted to wean but was unsuccessful, Capno    Diet: Clear liquid diet, tolerating well  Bowel status: LBM PTA, passing gas, BS+  : Voiding spont. To bathroom  Skin: L neck incision KALLIE, covered with bacitracin, JPx1 to L neck with bright red output, bruising and swelling to incision site  Pain: Incisional and throat pain manageable  Activity: Independent in room, needs help getting disconnected  Social: Daughter at the bedside, supportive  Plan: Continue to monitor and POC, ENT will assess pt in the AM to see if she should stay another night or be discharged tomorrow, Daughter would like to be notified once that decision has been made  Education completed: INDIA education continued and enforced    Non-urgent Pt Romy Izquierdo [id number]  FYI pt has some bright red bleeding coming/leaking from the INDIA insertion site.  Thanks Ruthann [phone]

## 2024-01-23 NOTE — PLAN OF CARE
Status: POD 1 excision of L carotid body mass 1/22  Vitals: VSS on 1.5L attempted to wean but patient was still sating as low as 77%.  Neuros: A&Ox4, generalized weakness.   IV: PIV SL   Resp/trach: Currently on 1.5L. Utilizing IS during waking hours.   Diet: Regular  Bowel status: LBM PTA  : Voiding spontaneously.  Skin: L neck incision, sutured and KALLIE. Jpx1 to L neck. Small amount of sanguinous drainage coming from INDIA site (team already aware).  Increase in bruising and swelling, ENT notified.  No concerns at this time  Activity: Independent in room. Calls appropriately.  Plan: Continue with current POC. ENT will assess this morning to see if patient should stay another night or be discharged today. Daughter would like to be

## 2024-01-23 NOTE — PLAN OF CARE
"[id number]  /74 (BP Location: Left arm, Patient Position: Sitting, Cuff Size: Adult Regular)   Pulse 62   Temp 98.6  F (37  C) (Oral)   Resp 16   Ht 1.575 m (5' 2\")   Wt 90 kg (198 lb 6.6 oz)   SpO2 90%   BMI 36.29 kg/m      A&Ox4, calls appropriately. VSS on room air. Up independently in room. Denies pain, denies nausea. Regular diet, good appetite. Voiding w/out difficulty. Passing flatus, LBM PTA. L neck incision KALLIE w/ localized swelling and scant drainage. INDIA to bulb suction. R PIV saline locked.     Goal Outcome Evaluation:    Plan of Care Reviewed With: patient  Overall Patient Progress: improvingOverall Patient Progress: improving    Outcome Evaluation: Ambulated in [place] x2 on shift. Hoping to discharge tomorrow, feeling well postop. Intermittently needing .5-1L via nasal cannula to maintain sats >90%.      "

## 2024-01-23 NOTE — PROGRESS NOTES
"Otolaryngology Progress Note  January 23, 2024    S: No acute events overnight. Concerns for bleeding around INDIA site but INDIA still holding suction, no neck swelling or boogie blood in INDIA bulb. Nursing staffs paged with concerns again before rounds on more bruising around the neck. Patient has no pinpoint pain. Pain is well controlled with oxy. She cannot take tylenol due to hives. This morning she feels good. No breathing issues. No history of FRANCOIS. She remained on 2L due to dipping in the 70s while sleep and occasional in the 80s while awake fully alert. She tolerated a clear diet without ssx aspiration. She takes lasix prn based on daily weight checks and significant  fluctuations in weight.      O: /59 (BP Location: Left arm, Patient Position: Semi-Warren's, Cuff Size: Adult Regular)   Pulse 66   Temp 98.3  F (36.8  C) (Oral)   Resp 18   Ht 1.575 m (5' 2\")   Wt 90 kg (198 lb 6.6 oz)   SpO2 96%   BMI 36.29 kg/m     General: Alert and oriented x 3, No acute distress   HEENT: Neck soft with kristy-incisional ecchymosis, Minimal supra incisional ecchymosis. No contents in INDIA drain but line has serosang and INDIA holding suction.    Pulmonary: Breathing non-labored, no stridor, no accessory muscle use. Oxygen disconnected while in room she remained above 89% thorough out discussion  Neuro: minimal shaunna mandibular nerve weakness most noticeable with smile and grimace. Remainder of FN intact CN X, XI, and XII intact        Intake/Output Summary (Last 24 hours) at 1/23/2024 0520  Last data filed at 1/22/2024 2300  Gross per 24 hour   Intake 2350 ml   Output 1067 ml   Net 1283 ml     INDIA drain output(s): (last 24 hours)/(last shift)  INDIA #1: 15/77/8    LABS:  ROUTINE IP LABS (Last four results)  BMP  Recent Labs   Lab 01/22/24  0600   GLC 99     CBCNo lab results found in last 7 days.  INRNo lab results found in last 7 days.    A/P: Romy Izquierdo is a 73 year old female with a past medical history of HTN, " Hypertrophic cardiomyopathy, paroxymal A. Fib, migraines, and Left sided carotid body tumor s/p transcervical resection 1/22/2024.    - Will discuss ibuprofen use with staff given tylenol reaction  - Regular diet  - IS plus ambulation given new oxygen requirements  - OT    Neuro:  - Pain control: oxycodone 5mg Q4h   - Has a reaction hive reaction to tylenol    HEENT:  - Incision cares: clean with 0.9% sodium chloride and apply Aquaphor Q8H   - Monitor and record INDIA drain output Qshift    Respiratory: On 2L ON due to dips in the 70s while sleep and 80s while awake. No hx of FRANCOIS. She has no respiratory symptoms   - supplemental O2 PRN to keep sats >92%  CV/heme: Hx cardiomyopathy, A.fib, and HTN  - hemodynamically stable  - PTA Metoprolol 50mg BID  - Lasix 40mg prn  - 81mg ASA held until 1/24  FEN/GI: Tolerated clear liquid diet  - Advance to regular diet  - Bowel regimen: Miralax daily, senna-doc BID  :  - voiding independently  Endo: Postoperative Glc wnl  ID: No ssx infection   PPX:  - SCDs  - IS  Consults:  - OT for shoulder exercise   Dispo: Home next 1-2 days pending oxygen requirements     -- Patient and above plan discussed with Dr. Dawson Orr.      Taryn Ruiz MD PGY5  Otolaryngology-Head & Neck Surgery  Please contact ENT with questions by dialing [phone] and entering job code [phone] when prompted.

## 2024-01-23 NOTE — PROVIDER NOTIFICATION
6A 213 SHERINE Izquierdo. Patient's incision bruising and swelling has increased. Output from drain was 8 mL.   Donna [phone]

## 2024-01-23 NOTE — PROVIDER NOTIFICATION
"ENT paged:     \"6A, rm 213, F. Timbo  Pt is complaining of new numbness to L ear, throat, and face starting from incision. There is also a slight increase in swelling at incision site. Did you want to come assess?    #[phone] Tashia Obando\"  "

## 2024-01-24 ENCOUNTER — APPOINTMENT (OUTPATIENT)
Dept: OCCUPATIONAL THERAPY | Facility: CLINIC | Age: 74
DRG: 041 | End: 2024-01-24
Attending: OTOLARYNGOLOGY
Payer: COMMERCIAL

## 2024-01-24 VITALS
BODY MASS INDEX: 36.51 KG/M2 | SYSTOLIC BLOOD PRESSURE: 142 MMHG | WEIGHT: 198.41 LBS | TEMPERATURE: 98 F | HEIGHT: 62 IN | DIASTOLIC BLOOD PRESSURE: 84 MMHG | HEART RATE: 59 BPM | RESPIRATION RATE: 16 BRPM | OXYGEN SATURATION: 91 %

## 2024-01-24 LAB — GLUCOSE BLDC GLUCOMTR-MCNC: 93 MG/DL (ref 70–99)

## 2024-01-24 PROCEDURE — 97535 SELF CARE MNGMENT TRAINING: CPT | Mod: GO | Performed by: OCCUPATIONAL THERAPIST

## 2024-01-24 PROCEDURE — 250N000013 HC RX MED GY IP 250 OP 250 PS 637

## 2024-01-24 PROCEDURE — 97530 THERAPEUTIC ACTIVITIES: CPT | Mod: GO | Performed by: OCCUPATIONAL THERAPIST

## 2024-01-24 RX ORDER — MINERAL OIL/HYDROPHIL PETROLAT
OINTMENT (GRAM) TOPICAL EVERY 8 HOURS
Qty: 50 G | Refills: 3 | Status: SHIPPED | OUTPATIENT
Start: 2024-01-24 | End: 2024-09-14

## 2024-01-24 RX ORDER — AMOXICILLIN 250 MG
1 CAPSULE ORAL 2 TIMES DAILY
Qty: 30 TABLET | Refills: 0 | Status: SHIPPED | OUTPATIENT
Start: 2024-01-24 | End: 2024-09-14

## 2024-01-24 RX ORDER — OXYCODONE HYDROCHLORIDE 5 MG/1
5 TABLET ORAL EVERY 4 HOURS PRN
Qty: 12 TABLET | Refills: 0 | Status: SHIPPED | OUTPATIENT
Start: 2024-01-24 | End: 2024-01-24

## 2024-01-24 RX ADMIN — POLYETHYLENE GLYCOL 3350 17 G: 17 POWDER, FOR SOLUTION ORAL at 08:13

## 2024-01-24 RX ADMIN — CYCLOSPORINE 1 DROP: 0.5 EMULSION OPHTHALMIC at 08:12

## 2024-01-24 RX ADMIN — METOPROLOL SUCCINATE 50 MG: 50 TABLET, EXTENDED RELEASE ORAL at 08:12

## 2024-01-24 RX ADMIN — SENNOSIDES AND DOCUSATE SODIUM 1 TABLET: 8.6; 5 TABLET ORAL at 08:12

## 2024-01-24 RX ADMIN — WHITE PETROLATUM: 1.75 OINTMENT TOPICAL at 08:14

## 2024-01-24 ASSESSMENT — ACTIVITIES OF DAILY LIVING (ADL)
ADLS_ACUITY_SCORE: 24
ADLS_ACUITY_SCORE: 22
ADLS_ACUITY_SCORE: 24
ADLS_ACUITY_SCORE: 24

## 2024-01-24 NOTE — PROGRESS NOTES
1200     Discharge    D: Orders for discharge and outpatient medications written.  I: Home medications and return to clinic schedule reviewed with patient. Discharge instructions and parameters for calling Health Care Provider reviewed. Patient left at 1150 accompanied by RN.   A: Patient/family verbalized understanding and was ready for discharge.   P: Patient instructed to  medications in Pharmacy. Follow up as scheduled, printed on discharge summary and explained.

## 2024-01-24 NOTE — PLAN OF CARE
Goal Outcome Evaluation:      Plan of Care Reviewed With: patient    Overall Patient Progress: improvingOverall Patient Progress: improving    Outcome Evaluation: Ambulating the halls independently, spent the majority of the evening on RA, required 0.5L while sleeping to maintain sats >90%    Status: Pt POD#1 excision of L carotid body mass 1/22   Vitals: VSS on 0.5L NC while sleeping and slight HTN   Neuros: AOx4, generalized weakness, strengths 5/5   IV: PIV SL   Labs/Electrolytes: WNL  Resp/trach: Spent the majority of the shift on RA, required 0.5L while sleeping  Diet: Regular, good appetitive  Bowel status: LBM PTA   : Voiding spontaneously   Skin: L neck incision, sutured and KALLIE. Jpx1 to L neck. Small amount of sanguinous drainage coming from INDIA site (team already aware).  Increase in bruising and swelling, ENT notified.  No concerns at this time   Pain: Denies  Activity: Up ad jonh  Social: Daughter at the bedside tonight, supportive  Plan: Continue to monitor and POC  Education completed: INDIA education continued

## 2024-01-24 NOTE — PLAN OF CARE
"[id number]  BP (!) 142/84 (BP Location: Right arm)   Pulse 59   Temp 98  F (36.7  C) (Oral)   Resp 16   Ht 1.575 m (5' 2\")   Wt 90 kg (198 lb 6.6 oz)   SpO2 91%   BMI 36.29 kg/m      Afebrile, VSS.   No acute event this morning.   INDIA removed by physician.       NEURO: Alert and oriented x4.      RESPIRATORY: Room Air, denies dizziness, and SOB. Lungs sound, clear, equal bilateral.     CARDIO: VSS, denies dizziness, and extremity pain.      GI/: Denies N/V, BS active, Pt reported small BM this AM, void urine spontaneously without saving.       SKIN: Intact (except surgical incision)     ACTIVITY: Independent.      PAIN: Denies pain.    DLA: PIV, removed     BG: No      PLAN:   Pt may discharge this afternoon.       Continue monitoring.     * Italic, from provider's note.    Goal Outcome Evaluation:      Plan of Care Reviewed With: patient      "

## 2024-01-24 NOTE — PLAN OF CARE
Status: Pt POD#2 excision of L carotid body mass 1/22    Vitals: VSS on RA ex triny in the 50's  Neuros: intact. L sided numbness to face d/t surgery. Team is aware. 5/5 strengths throughout with generalized weakness  IV: PIV SL  Labs/Electrolytes: BG 97  Resp/trach: Currently on RA, SOB with activity. Pt has a hx of CHF.   Diet: regular  Bowel status: passing gas, LBM 1/22 per pt  : Voids spontaneously yo BR  Skin: L neck incision, sutured and KALLIE. INDIA x1 to L neck. Small amount of sanguinous drainage coming from INDIA site. Bruising and swelling to incision site. Aquaphor applied  Pain: Denies  Activity: Up ad jonh. Up in chair  Plan: Potential discharge today. Continue to monitor and follow POC    Goal Outcome Evaluation:      Plan of Care Reviewed With: patient    Overall Patient Progress: improvingOverall Patient Progress: improving    Outcome Evaluation: Pt is currently on RA, satting abouve 92%

## 2024-01-24 NOTE — DISCHARGE SUMMARY
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 It was recommended that she undergo operative intervention and the patient consented to the above procedure after detailed explanation of the risks and benefits of said procedure.    Hospital Course: The patient was admitted to the hospital and underwent the above mentioned procedure. She tolerated the procedure without any intra- or kristy-operative complications. Please see the operative report for full details of the procedure. The patient was admitted for post-operative monitoring. Her postoperative course was uneventful. She did require supplemental oxygen post-operatively to maintain oxygen saturations above 90%. Supplemental O2 was able to be weaned off and she was satting > 90% on room air prior to discharge.  At discharge, the patient's pain was well controlled, the patient was voiding on her own, and was ambulating and tolerating a regular diet.     Discharge Exam:  Vitals:    01/24/24 0400 01/24/24 0504 01/24/24 0600 01/24/24 0732   BP:    (!) 142/84   BP Location:    Right arm   Patient Position:       Cuff Size:       Pulse:       Resp:    16   Temp:    98  F (36.7  C)   TempSrc:    Oral   SpO2: 96% 95% 91% 91%   Weight:       Height:           General: A&O x 3, No acute distress  HEENT: EOMI. Face symmetric. Left neck incision is clean, dry, and intact; no bleeding no drainage. Surrounding ecchymosis and kristy-incisional swelling stable. INDIA drain  removed.  Respiratory: Breathing non-labored on room air, no stridor, no accessory muscle use.     Discharge Medications:     Medication List        Started      senna-docusate 8.6-50 MG tablet  Commonly known as: SENOKOT-S/PERICOLACE  1 tablet, Oral, 2 TIMES DAILY            Modified      * Cetaphil Moisturizing external lotion  What changed: Another medication with the same name was added. Make sure you understand how and when to take each.     * mineral oil-hydrophilic petrolatum external ointment  Topical, EVERY 8 HOURS, Apply to incisions  What changed: You were already taking a medication with the same name, and this prescription was added. Make sure you understand how and when to take each.           * This list has 2 medication(s) that are the same as other medications prescribed for you. Read the directions carefully, and ask your doctor or other care provider to review them with you.                  Discharge Procedure Orders   Reason for your hospital stay   Order Comments: Post-operative care     Activity   Order Comments: Your activity upon discharge: No heavy lifting greater than 10 lbs and no strenuous exercise for 2 weeks or until follow up appointment. No driving while taking narcotic pain medications.     Order Specific Question Answer Comments   Is discharge order? Yes      Adult [facility] Follow-up and recommended labs and tests   Order Comments: Follow up in ENT clinic with Dr. Orr on 1/31 at 2:30pm. Please call the clinic with questions/concerns: [phone].    Otolaryngology/ENT Clinic:  [facility]  [address] [place] [id number]      Appointments on [place] and/or [facility] (with [facility] or [facility] provider or service). Call [phone] if you haven't heard regarding these appointments within 7 days of discharge.     When to contact your care team   Order Comments: Please notify your doctor if you experience wound  "breakdown, sustained bleeding from the wound site, or increasing redness, swelling, and/or purulent malorodorous discharge from the wound site which may indicate infection. If you feel it is acute, or experience sudden changes in breathing, chest pain, or excessive sleepiness/somnolence please return to the emergency department or call 911. If you have questions or concerns during the day please call ENT clinic and [phone]. If at night you can call [facility] at [phone] and ask for the \"ENT resident on call\".     Wound care and dressings   Order Comments: Instructions to care for your wound at home:Keep incisions clean and dry. Apply Aquaphor ointment to incisions three times daily to keep moist. You may shower, do not soak, scrub, or submerge incisions under water. If you have a surgical drain, do not get the drain site wet until 24 hours after the drain has been removed.     Diet   Order Comments: Follow this diet upon discharge: Regular diet     Order Specific Question Answer Comments   Is discharge order? Yes        Dispo: To home in good condition. All of the patient's questions/concerns have been addressed at this time.     Pamela Finley PA-C  Otolaryngology-Head & Neck Surgery  Please contact ENT by dialing * [phone] and entering job code [phone].    "

## 2024-01-24 NOTE — PROGRESS NOTES
Occupational Therapy Discharge Summary    Reason for therapy discharge:    Discharged to home.    Progress towards therapy goal(s). See goals on Care Plan in [facility] electronic health record for goal details.  Goals met    Therapy recommendation(s):    No further therapy is recommended. Continue HEP per recommendations.

## 2024-01-29 LAB
PATH REPORT.ADDENDUM SPEC: NORMAL
PATH REPORT.ADDENDUM SPEC: NORMAL
PATH REPORT.COMMENTS IMP SPEC: NORMAL
PATH REPORT.FINAL DX SPEC: NORMAL
PATH REPORT.GROSS SPEC: NORMAL
PATH REPORT.MICROSCOPIC SPEC OTHER STN: NORMAL
PATH REPORT.RELEVANT HX SPEC: NORMAL
PHOTO IMAGE: NORMAL

## 2024-01-30 ENCOUNTER — TELEPHONE (OUTPATIENT)
Dept: OTOLARYNGOLOGY | Facility: CLINIC | Age: 74
End: 2024-01-30
Payer: COMMERCIAL

## 2024-01-30 NOTE — TELEPHONE ENCOUNTER
[facility] Call Center    Phone Message    May a detailed message be left on voicemail: yes     Reason for Call: Other: per patient 3 of the stitch bumps are pink and not hot to the touch. Patient hasn't noticed any discharge and using aquafor 3x daily. Wanting to know if that means infection or if that means healing. Please reach out to patient      Action Taken: Other: ENT    Travel Screening: Not Applicable

## 2024-01-30 NOTE — TELEPHONE ENCOUNTER
Returned call to patient who indicates that she has some small red bumps along the suture line on left side of neck. She feels that these have changed color over the last day or so. Denies further concerns including, drainage, fever, increased pain, and increased swelling.     The patient sent the following photo for review:          Reviewed with patient that there are no concerns from this photo. She will continue with current site cares and follow-up as scheduled tomorrow. Patient was encouraged to call with further questions or concerns.       Nicole Villarreal, RN, BSN

## 2024-01-31 ENCOUNTER — OFFICE VISIT (OUTPATIENT)
Dept: OTOLARYNGOLOGY | Facility: CLINIC | Age: 74
End: 2024-01-31
Payer: COMMERCIAL

## 2024-01-31 VITALS
WEIGHT: 199 LBS | DIASTOLIC BLOOD PRESSURE: 94 MMHG | OXYGEN SATURATION: 93 % | HEIGHT: 62 IN | SYSTOLIC BLOOD PRESSURE: 165 MMHG | BODY MASS INDEX: 36.62 KG/M2 | HEART RATE: 65 BPM | TEMPERATURE: 98.6 F

## 2024-01-31 DIAGNOSIS — J96.01 ACUTE RESPIRATORY FAILURE WITH HYPOXIA (H): Primary | ICD-10-CM

## 2024-01-31 DIAGNOSIS — I48.0 PAROXYSMAL ATRIAL FIBRILLATION (H): ICD-10-CM

## 2024-01-31 DIAGNOSIS — J41.0 SIMPLE CHRONIC BRONCHITIS (H): ICD-10-CM

## 2024-01-31 PROCEDURE — 99024 POSTOP FOLLOW-UP VISIT: CPT | Performed by: OTOLARYNGOLOGY

## 2024-01-31 ASSESSMENT — PAIN SCALES - GENERAL: PAINLEVEL: NO PAIN (0)

## 2024-01-31 NOTE — LETTER
1/31/2024       RE: Romy Izquierdo  [address]  [place] [address]     Dear Colleague,    Thank you for referring your patient, Romy Izquierdo, to the [facility] [place] at [facility]. Please see a copy of my visit note below.    The patient is a 73-year-old woman who is status post excision of a left-sided small carotid body tumor.  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  She did well in the hospital and has done well at home.    Examination: This reveals some swelling at the incision site which the patient notes is already improving.  Her marginal mandibular function is normal as is her 12th nerve and 11th nerve function.    Impression: Doing well    Plan: She is very pleased, the patient will follow-up on an as-needed basis.      Again, thank you for allowing me to participate in the care of your patient.      Sincerely,    Dawson Orr MD

## 2024-01-31 NOTE — NURSING NOTE
"Chief Complaint   Patient presents with    RECHECK     1 week post op      Blood pressure (!) 165/94, pulse 65, temperature 98.6  F (37  C), height 1.575 m (5' 2\"), weight 90.3 kg (199 lb), SpO2 93%.  Aron Perrin LPN   "

## 2024-01-31 NOTE — PROGRESS NOTES
The patient is a 73-year-old woman who is status post excision of a left-sided small carotid body tumor.  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  She did well in the hospital and has done well at home.    Examination: This reveals some swelling at the incision site which the patient notes is already improving.  Her marginal mandibular function is normal as is her 12th nerve and 11th nerve function.    Impression: Doing well    Plan: She is very pleased, the patient will follow-up on an as-needed basis.

## 2024-02-01 NOTE — PATIENT INSTRUCTIONS
1. Please follow-up in clinic as needed with Dr. Orr.   2. Please call the ENT clinic with any questions,concerns, new or worsening symptoms.    -Clinic number is [phone]   - 's direct line (Dr. Orr's nurse) [phone]

## 2024-02-02 LAB
MDC_IDC_MSMT_BATTERY_STATUS: NORMAL
MDC_IDC_PG_IMPLANT_DTM: NORMAL
MDC_IDC_PG_MFG: NORMAL
MDC_IDC_PG_MODEL: NORMAL
MDC_IDC_PG_SERIAL: NORMAL
MDC_IDC_PG_TYPE: NORMAL
MDC_IDC_SESS_CLINIC_NAME: NORMAL
MDC_IDC_SESS_DTM: NORMAL
MDC_IDC_SESS_TYPE: NORMAL
MDC_IDC_SET_ZONE_DETECTION_BEATS_DENOMINATOR: 16 {BEATS}
MDC_IDC_SET_ZONE_DETECTION_BEATS_DENOMINATOR: 4 {BEATS}
MDC_IDC_SET_ZONE_DETECTION_BEATS_NUMERATOR: 16 {BEATS}
MDC_IDC_SET_ZONE_DETECTION_BEATS_NUMERATOR: 4 {BEATS}
MDC_IDC_SET_ZONE_DETECTION_INTERVAL: 2000 MS
MDC_IDC_SET_ZONE_DETECTION_INTERVAL: 3000 MS
MDC_IDC_SET_ZONE_DETECTION_INTERVAL: 370 MS
MDC_IDC_SET_ZONE_STATUS: NORMAL
MDC_IDC_SET_ZONE_TYPE: NORMAL
MDC_IDC_SET_ZONE_VENDOR_TYPE: NORMAL
MDC_IDC_STAT_AT_BURDEN_PERCENT: 0 %
MDC_IDC_STAT_AT_DTM_END: NORMAL
MDC_IDC_STAT_AT_DTM_START: NORMAL
MDC_IDC_STAT_EPISODE_RECENT_COUNT: 0
MDC_IDC_STAT_EPISODE_RECENT_COUNT: 42
MDC_IDC_STAT_EPISODE_RECENT_COUNT_DTM_END: NORMAL
MDC_IDC_STAT_EPISODE_RECENT_COUNT_DTM_START: NORMAL
MDC_IDC_STAT_EPISODE_TOTAL_COUNT: 0
MDC_IDC_STAT_EPISODE_TOTAL_COUNT: 3
MDC_IDC_STAT_EPISODE_TOTAL_COUNT: 33
MDC_IDC_STAT_EPISODE_TOTAL_COUNT: 441
MDC_IDC_STAT_EPISODE_TOTAL_COUNT_DTM_END: NORMAL
MDC_IDC_STAT_EPISODE_TOTAL_COUNT_DTM_START: NORMAL
MDC_IDC_STAT_EPISODE_TYPE: NORMAL

## 2024-02-02 PROCEDURE — 93298 REM INTERROG DEV EVAL SCRMS: CPT | Performed by: INTERNAL MEDICINE

## 2024-02-12 ENCOUNTER — TRANSFERRED RECORDS (OUTPATIENT)
Dept: HEALTH INFORMATION MANAGEMENT | Facility: CLINIC | Age: 74
End: 2024-02-12
Payer: COMMERCIAL

## 2024-02-13 ENCOUNTER — TELEPHONE (OUTPATIENT)
Dept: OTOLARYNGOLOGY | Facility: CLINIC | Age: 74
End: 2024-02-13
Payer: COMMERCIAL

## 2024-02-13 NOTE — TELEPHONE ENCOUNTER
Author spoke with patient, she stated that she fell on Sunday and broke her knee cap. She went to [facility] urgent care and confirm this with an Xray. She has an appointment scheduled with an orthopedic surgeon this coming Friday.     I advised patient not exercise until she is seen by the doctor.     Patient verbalized understanding and will call if she has more questions.     Cinda Garrison RN on 2/13/2024 at 4:04 PM

## 2024-02-13 NOTE — TELEPHONE ENCOUNTER
[facility] Call Center    Phone Message    May a detailed message be left on voicemail: yes     Reason for Call: Other: Pt called stating that she fell on Sunday and broke her kneecap. Pt is wondering when she can use the [employer] pool to help with her PT, and if it would be ok with her incision from her surgery. Please call pt back to discuss     Action Taken: Other: [facility] ENT    Travel Screening: Not Applicable

## 2024-02-13 NOTE — TELEPHONE ENCOUNTER
[facility] Call Center    Phone Message    May a detailed message be left on voicemail: yes     Reason for Call: Other: Pt had an additional question for the care team she's wondering if it's okay for her to use the step machine at the gym, please call her back to let her know and discuss further, thanks    Action Taken: Other: ENT    Travel Screening: Not Applicable

## 2024-02-13 NOTE — TELEPHONE ENCOUNTER
This writer called patient to discuss her incision being exposed to water. Per Chelsea CHAND care coordinator, the patient would be okay to go in the water, she should just try to keep her neck out of the water. Patient was unavailable so a voicemail message was left with this information and patient was provided the call center number should she have any additional questions.    MITRA AMBROSIO, STACY on 2/13/2024 at 1:38 PM

## 2024-02-16 ENCOUNTER — TRANSFERRED RECORDS (OUTPATIENT)
Dept: HEALTH INFORMATION MANAGEMENT | Facility: CLINIC | Age: 74
End: 2024-02-16
Payer: COMMERCIAL

## 2024-03-07 ENCOUNTER — TELEPHONE (OUTPATIENT)
Dept: CARDIOLOGY | Facility: CLINIC | Age: 74
End: 2024-03-07
Payer: COMMERCIAL

## 2024-03-07 NOTE — TELEPHONE ENCOUNTER
Health Call Center    Phone Message    May a detailed message be left on voicemail: yes     Reason for Call: Other: Patient fell and fractured her knee cap, she has an appt 3/8 with [facility] @10:10 and thinks they may want to do a cortisone injection. She wants to make sure that this is ok to do with her heart condition. Please call her back to discuss.      Action Taken: Other: cardiology    Travel Screening: Not Applicable   Thank you!  [facility]

## 2024-03-08 ENCOUNTER — TRANSFERRED RECORDS (OUTPATIENT)
Dept: HEALTH INFORMATION MANAGEMENT | Facility: CLINIC | Age: 74
End: 2024-03-08
Payer: COMMERCIAL

## 2024-04-18 ENCOUNTER — ANCILLARY PROCEDURE (OUTPATIENT)
Dept: CARDIOLOGY | Facility: CLINIC | Age: 74
End: 2024-04-18
Attending: INTERNAL MEDICINE
Payer: COMMERCIAL

## 2024-04-18 DIAGNOSIS — I48.0 PAF (PAROXYSMAL ATRIAL FIBRILLATION) (H): ICD-10-CM

## 2024-04-18 DIAGNOSIS — Z45.09 ENCOUNTER FOR LOOP RECORDER CHECK: ICD-10-CM

## 2024-04-18 LAB
MDC_IDC_EPISODE_DTM: NORMAL
MDC_IDC_EPISODE_DURATION: 10.54 S
MDC_IDC_EPISODE_DURATION: 3.09 S
MDC_IDC_EPISODE_DURATION: 3.12 S
MDC_IDC_EPISODE_DURATION: 3.5 S
MDC_IDC_EPISODE_DURATION: 3.55 S
MDC_IDC_EPISODE_DURATION: 3.6 S
MDC_IDC_EPISODE_DURATION: 3.74 S
MDC_IDC_EPISODE_DURATION: 3.76 S
MDC_IDC_EPISODE_DURATION: 3.81 S
MDC_IDC_EPISODE_DURATION: 3.89 S
MDC_IDC_EPISODE_DURATION: 3.92 S
MDC_IDC_EPISODE_DURATION: 4.05 S
MDC_IDC_EPISODE_DURATION: 4.25 S
MDC_IDC_EPISODE_DURATION: 4.76 S
MDC_IDC_EPISODE_DURATION: 4.97 S
MDC_IDC_EPISODE_DURATION: 5 S
MDC_IDC_EPISODE_DURATION: 5.14 S
MDC_IDC_EPISODE_DURATION: 5.21 S
MDC_IDC_EPISODE_DURATION: 6.17 S
MDC_IDC_EPISODE_DURATION: 7.51 S
MDC_IDC_EPISODE_DURATION: 7.63 S
MDC_IDC_EPISODE_DURATION: 9.17 S
MDC_IDC_EPISODE_DURATION: 9.66 S
MDC_IDC_EPISODE_DURATION: 9.74 S
MDC_IDC_EPISODE_ID: 478
MDC_IDC_EPISODE_ID: 479
MDC_IDC_EPISODE_ID: 480
MDC_IDC_EPISODE_ID: 481
MDC_IDC_EPISODE_ID: 482
MDC_IDC_EPISODE_ID: 483
MDC_IDC_EPISODE_ID: 484
MDC_IDC_EPISODE_ID: 485
MDC_IDC_EPISODE_ID: 486
MDC_IDC_EPISODE_ID: 487
MDC_IDC_EPISODE_ID: 488
MDC_IDC_EPISODE_ID: 489
MDC_IDC_EPISODE_ID: 490
MDC_IDC_EPISODE_ID: 491
MDC_IDC_EPISODE_ID: 492
MDC_IDC_EPISODE_ID: 493
MDC_IDC_EPISODE_ID: 494
MDC_IDC_EPISODE_ID: 495
MDC_IDC_EPISODE_ID: 496
MDC_IDC_EPISODE_ID: 497
MDC_IDC_EPISODE_ID: 498
MDC_IDC_EPISODE_ID: 499
MDC_IDC_EPISODE_ID: 500
MDC_IDC_EPISODE_ID: 501
MDC_IDC_EPISODE_TYPE: NORMAL
MDC_IDC_MSMT_BATTERY_STATUS: NORMAL
MDC_IDC_PG_IMPLANT_DTM: NORMAL
MDC_IDC_PG_MFG: NORMAL
MDC_IDC_PG_MODEL: NORMAL
MDC_IDC_PG_SERIAL: NORMAL
MDC_IDC_PG_TYPE: NORMAL
MDC_IDC_SESS_CLINIC_NAME: NORMAL
MDC_IDC_SESS_DTM: NORMAL
MDC_IDC_SESS_TYPE: NORMAL
MDC_IDC_SET_ZONE_DETECTION_BEATS_DENOMINATOR: 16 {BEATS}
MDC_IDC_SET_ZONE_DETECTION_BEATS_DENOMINATOR: 4 {BEATS}
MDC_IDC_SET_ZONE_DETECTION_BEATS_NUMERATOR: 16 {BEATS}
MDC_IDC_SET_ZONE_DETECTION_BEATS_NUMERATOR: 4 {BEATS}
MDC_IDC_SET_ZONE_DETECTION_INTERVAL: 2000 MS
MDC_IDC_SET_ZONE_DETECTION_INTERVAL: 3000 MS
MDC_IDC_SET_ZONE_DETECTION_INTERVAL: 370 MS
MDC_IDC_SET_ZONE_STATUS: NORMAL
MDC_IDC_SET_ZONE_TYPE: NORMAL
MDC_IDC_SET_ZONE_VENDOR_TYPE: NORMAL
MDC_IDC_STAT_AT_BURDEN_PERCENT: 0 %
MDC_IDC_STAT_AT_DTM_END: NORMAL
MDC_IDC_STAT_AT_DTM_START: NORMAL
MDC_IDC_STAT_EPISODE_RECENT_COUNT: 0
MDC_IDC_STAT_EPISODE_RECENT_COUNT: 24
MDC_IDC_STAT_EPISODE_RECENT_COUNT_DTM_END: NORMAL
MDC_IDC_STAT_EPISODE_RECENT_COUNT_DTM_START: NORMAL
MDC_IDC_STAT_EPISODE_TOTAL_COUNT: 0
MDC_IDC_STAT_EPISODE_TOTAL_COUNT: 3
MDC_IDC_STAT_EPISODE_TOTAL_COUNT: 33
MDC_IDC_STAT_EPISODE_TOTAL_COUNT: 465
MDC_IDC_STAT_EPISODE_TOTAL_COUNT_DTM_END: NORMAL
MDC_IDC_STAT_EPISODE_TOTAL_COUNT_DTM_START: NORMAL
MDC_IDC_STAT_EPISODE_TYPE: NORMAL

## 2024-04-18 PROCEDURE — 93298 REM INTERROG DEV EVAL SCRMS: CPT | Performed by: INTERNAL MEDICINE

## 2024-05-06 ENCOUNTER — PATIENT OUTREACH (OUTPATIENT)
Dept: OTOLARYNGOLOGY | Facility: CLINIC | Age: 74
End: 2024-05-06
Payer: COMMERCIAL

## 2024-05-06 NOTE — TELEPHONE ENCOUNTER
Received a message from patient requesting if she can proceed with pool therapy as she had a recent fall and they are recommending pool therapy. Patient was questioning to ensure she can submerge her neck incision under water now.     Writer returned call and left message for patient that she can proceed with submerging her incision as there is no concern as she is well out of surgery and no longer needs this restriction. Left direct line and encouraged patient to return call with any further questions or concerns.       Nicoel Villarreal, RN, BSN

## 2024-07-11 ENCOUNTER — TELEPHONE (OUTPATIENT)
Dept: INTERNAL MEDICINE | Facility: CLINIC | Age: 74
End: 2024-07-11
Payer: COMMERCIAL

## 2024-07-11 DIAGNOSIS — Z78.0 POSTMENOPAUSAL STATUS: Primary | ICD-10-CM

## 2024-07-11 NOTE — TELEPHONE ENCOUNTER
Order/Referral Request    Who is requesting: Romy    Orders being requested: Bone Density    Reason service is needed/diagnosis:     When are orders needed by: as soon as possible    Has this been discussed with Provider: Yes    Does patient have a preference on a Group/Provider/Facility? [facility] in [facility] and she is going to that one because they have all of her test results    Does patient have an appointment scheduled?: No    Where to send orders: Place orders within Epic    Could we send this information to you in [facility] or would you prefer to receive a phone call?:   Patient would prefer a phone call   Okay to leave a detailed message?: Yes at Cell number on file:    Telephone Information:   Mobile [phone]

## 2024-07-16 ENCOUNTER — ALLIED HEALTH/NURSE VISIT (OUTPATIENT)
Dept: FAMILY MEDICINE | Facility: CLINIC | Age: 74
End: 2024-07-16
Payer: COMMERCIAL

## 2024-07-16 VITALS — SYSTOLIC BLOOD PRESSURE: 134 MMHG | DIASTOLIC BLOOD PRESSURE: 82 MMHG

## 2024-07-16 DIAGNOSIS — I10 ESSENTIAL HYPERTENSION: Primary | ICD-10-CM

## 2024-07-16 PROCEDURE — 99207 PR NO CHARGE NURSE ONLY: CPT

## 2024-07-16 NOTE — PROGRESS NOTES
I met with Romy Izquierdo at the request of Diana Coello CNP to recheck her blood pressure.  Blood pressure medications on the med list were reviewed with patient.    Patient has taken all medications as per usual regimen: Yes  Patient reports tolerating them without any issues or concerns: Yes    Vitals:    07/16/24 1103 07/16/24 1113   BP: (!) 158/88 134/82   BP Location: Right arm Right arm   Patient Position: Sitting Sitting   Cuff Size: Adult Regular Adult Regular       After 5 minutes, the patient's blood pressure was <140/90, the previous encounter was reviewed, recorded blood pressure below 140/90. Patient was discharged and the note will be sent to the provider for final review.        Sammy Arreaga Jr., CMA on 7/16/2024 at 11:14 AM

## 2024-07-17 ENCOUNTER — PATIENT OUTREACH (OUTPATIENT)
Dept: CARE COORDINATION | Facility: CLINIC | Age: 74
End: 2024-07-17
Payer: COMMERCIAL

## 2024-07-26 ENCOUNTER — TRANSFERRED RECORDS (OUTPATIENT)
Dept: HEALTH INFORMATION MANAGEMENT | Facility: CLINIC | Age: 74
End: 2024-07-26
Payer: COMMERCIAL

## 2024-07-31 ENCOUNTER — ANCILLARY PROCEDURE (OUTPATIENT)
Dept: CARDIOLOGY | Facility: CLINIC | Age: 74
End: 2024-07-31
Attending: INTERNAL MEDICINE
Payer: COMMERCIAL

## 2024-07-31 ENCOUNTER — PATIENT OUTREACH (OUTPATIENT)
Dept: CARE COORDINATION | Facility: CLINIC | Age: 74
End: 2024-07-31

## 2024-07-31 DIAGNOSIS — I48.0 PAF (PAROXYSMAL ATRIAL FIBRILLATION) (H): ICD-10-CM

## 2024-07-31 DIAGNOSIS — Z45.09 ENCOUNTER FOR LOOP RECORDER CHECK: ICD-10-CM

## 2024-08-01 ENCOUNTER — ANCILLARY PROCEDURE (OUTPATIENT)
Dept: BONE DENSITY | Facility: CLINIC | Age: 74
End: 2024-08-01
Attending: NURSE PRACTITIONER
Payer: COMMERCIAL

## 2024-08-01 DIAGNOSIS — Z78.0 POSTMENOPAUSAL STATUS: ICD-10-CM

## 2024-08-01 PROCEDURE — 77089 TBS DXA CAL W/I&R FX RISK: CPT

## 2024-08-01 PROCEDURE — 77080 DXA BONE DENSITY AXIAL: CPT | Mod: TC | Performed by: PHYSICIAN ASSISTANT

## 2024-08-02 LAB
MDC_IDC_MSMT_BATTERY_STATUS: NORMAL
MDC_IDC_PG_IMPLANT_DTM: NORMAL
MDC_IDC_PG_MFG: NORMAL
MDC_IDC_PG_MODEL: NORMAL
MDC_IDC_PG_SERIAL: NORMAL
MDC_IDC_PG_TYPE: NORMAL
MDC_IDC_SESS_CLINIC_NAME: NORMAL
MDC_IDC_SESS_DTM: NORMAL
MDC_IDC_SESS_TYPE: NORMAL
MDC_IDC_SET_ZONE_DETECTION_BEATS_DENOMINATOR: 16 {BEATS}
MDC_IDC_SET_ZONE_DETECTION_BEATS_DENOMINATOR: 4 {BEATS}
MDC_IDC_SET_ZONE_DETECTION_BEATS_NUMERATOR: 16 {BEATS}
MDC_IDC_SET_ZONE_DETECTION_BEATS_NUMERATOR: 4 {BEATS}
MDC_IDC_SET_ZONE_DETECTION_INTERVAL: 2000 MS
MDC_IDC_SET_ZONE_DETECTION_INTERVAL: 3000 MS
MDC_IDC_SET_ZONE_DETECTION_INTERVAL: 370 MS
MDC_IDC_SET_ZONE_STATUS: NORMAL
MDC_IDC_SET_ZONE_TYPE: NORMAL
MDC_IDC_SET_ZONE_VENDOR_TYPE: NORMAL
MDC_IDC_STAT_AT_BURDEN_PERCENT: 0 %
MDC_IDC_STAT_AT_DTM_END: NORMAL
MDC_IDC_STAT_AT_DTM_START: NORMAL
MDC_IDC_STAT_EPISODE_RECENT_COUNT: 0
MDC_IDC_STAT_EPISODE_RECENT_COUNT_DTM_END: NORMAL
MDC_IDC_STAT_EPISODE_RECENT_COUNT_DTM_START: NORMAL
MDC_IDC_STAT_EPISODE_TOTAL_COUNT: 0
MDC_IDC_STAT_EPISODE_TOTAL_COUNT: 3
MDC_IDC_STAT_EPISODE_TOTAL_COUNT: 33
MDC_IDC_STAT_EPISODE_TOTAL_COUNT: 465
MDC_IDC_STAT_EPISODE_TOTAL_COUNT_DTM_END: NORMAL
MDC_IDC_STAT_EPISODE_TOTAL_COUNT_DTM_START: NORMAL
MDC_IDC_STAT_EPISODE_TYPE: NORMAL

## 2024-08-02 PROCEDURE — 93298 REM INTERROG DEV EVAL SCRMS: CPT | Performed by: INTERNAL MEDICINE

## 2024-08-13 ENCOUNTER — TELEPHONE (OUTPATIENT)
Dept: CARDIOLOGY | Facility: CLINIC | Age: 74
End: 2024-08-13

## 2024-08-13 ENCOUNTER — ANCILLARY PROCEDURE (OUTPATIENT)
Dept: CARDIOLOGY | Facility: CLINIC | Age: 74
End: 2024-08-13
Attending: INTERNAL MEDICINE
Payer: COMMERCIAL

## 2024-08-13 DIAGNOSIS — I48.0 PAF (PAROXYSMAL ATRIAL FIBRILLATION) (H): ICD-10-CM

## 2024-08-13 DIAGNOSIS — Z45.09 ENCOUNTER FOR LOOP RECORDER CHECK: ICD-10-CM

## 2024-08-13 NOTE — TELEPHONE ENCOUNTER
----- Message from Brie Santana sent at 8/13/2024  8:11 AM CDT -----  Regarding: device at RRT  Type: alert remote loop recorder transmission for device at RRT. Courtesy check.   Device: Medtronic Reveal Linq.   Presenting rhythm: Sinus 60 bpm.   Battery status: device reached RRT on 8/10/24.   Arrhythmias: since 7/31/24; none detected.  Plan: Routed to device SUSANA Santana, Device Specialist      Heart Care Device Change-Out Checklist (SAM Checklist) for Implanted Loop Recorder    Device Data  :  Med[employer]vision  Model:  LNQ11 Reveal LINQ  Serial Number:  [id number]     Implant Date: 1/29/2021  SAM Date:  8/10/24  Device Diagnosis:  Hypertropic CM, PAF    Significant Findings:  No, all pauses reported by device were false      Patient requests to have device explant: No, patient stated she might want another Loop recorder to replace this one and wanted to talk to her Cardiologist before making a decision.    Routed to EP/Cardiologist: Dr. Hossein Arnold     Device RN: Justin Bean RN

## 2024-08-13 NOTE — TELEPHONE ENCOUNTER
[facility] Call Center    Phone Message    May a detailed message be left on voicemail: yes     Reason for Call: Other: Patient called requesting to speak with . The device clinic advised her to speak with him specifically to discuss her loop recorder. Please call back to further discuss.     Action Taken: Message routed to:  Other: Cardiology    Travel Screening: Not Applicable     Thank you!  [facility]

## 2024-08-13 NOTE — TELEPHONE ENCOUNTER
Notified Pt provider is out of the office and confirmed device RN had sent a message to Dr. Arnold. Will address upon his return. -diego

## 2024-08-16 LAB
MDC_IDC_MSMT_BATTERY_DTM: NORMAL
MDC_IDC_MSMT_BATTERY_STATUS: NORMAL
MDC_IDC_PG_IMPLANT_DTM: NORMAL
MDC_IDC_PG_MFG: NORMAL
MDC_IDC_PG_MODEL: NORMAL
MDC_IDC_PG_SERIAL: NORMAL
MDC_IDC_PG_TYPE: NORMAL
MDC_IDC_SESS_CLINIC_NAME: NORMAL
MDC_IDC_SESS_DTM: NORMAL
MDC_IDC_SESS_TYPE: NORMAL
MDC_IDC_SET_ZONE_TYPE: NORMAL
MDC_IDC_SET_ZONE_VENDOR_TYPE: NORMAL
MDC_IDC_STAT_AT_BURDEN_PERCENT: 0 %
MDC_IDC_STAT_AT_DTM_END: NORMAL
MDC_IDC_STAT_AT_DTM_START: NORMAL
MDC_IDC_STAT_EPISODE_RECENT_COUNT: 0
MDC_IDC_STAT_EPISODE_RECENT_COUNT_DTM_END: NORMAL
MDC_IDC_STAT_EPISODE_RECENT_COUNT_DTM_START: NORMAL
MDC_IDC_STAT_EPISODE_TOTAL_COUNT: 0
MDC_IDC_STAT_EPISODE_TOTAL_COUNT: 3
MDC_IDC_STAT_EPISODE_TOTAL_COUNT: 33
MDC_IDC_STAT_EPISODE_TOTAL_COUNT: 465
MDC_IDC_STAT_EPISODE_TOTAL_COUNT_DTM_END: NORMAL
MDC_IDC_STAT_EPISODE_TOTAL_COUNT_DTM_START: NORMAL
MDC_IDC_STAT_EPISODE_TYPE: NORMAL

## 2024-09-14 ENCOUNTER — OFFICE VISIT (OUTPATIENT)
Dept: URGENT CARE | Facility: URGENT CARE | Age: 74
End: 2024-09-14
Payer: COMMERCIAL

## 2024-09-14 VITALS
WEIGHT: 196.2 LBS | RESPIRATION RATE: 20 BRPM | TEMPERATURE: 98.8 F | HEART RATE: 57 BPM | BODY MASS INDEX: 35.89 KG/M2 | DIASTOLIC BLOOD PRESSURE: 86 MMHG | OXYGEN SATURATION: 95 % | SYSTOLIC BLOOD PRESSURE: 150 MMHG

## 2024-09-14 DIAGNOSIS — R05.1 ACUTE COUGH: ICD-10-CM

## 2024-09-14 DIAGNOSIS — U07.1 INFECTION DUE TO 2019 NOVEL CORONAVIRUS: Primary | ICD-10-CM

## 2024-09-14 PROCEDURE — 99213 OFFICE O/P EST LOW 20 MIN: CPT

## 2024-09-14 PROCEDURE — 87635 SARS-COV-2 COVID-19 AMP PRB: CPT

## 2024-09-14 RX ORDER — BENZONATATE 200 MG/1
200 CAPSULE ORAL 3 TIMES DAILY PRN
Qty: 30 CAPSULE | Refills: 0 | Status: SHIPPED | OUTPATIENT
Start: 2024-09-14

## 2024-09-14 NOTE — PROGRESS NOTES
Assessment & Plan     Infection due to 2019 novel coronavirus  Pt was seen for positive covid 19 test.    She qualify for treatment with paxlovid.  Discussed benefit risk profile.  She has normal renal function with las GFR 72.    Tessalon for cough.  At the end of the encounter, I discussed results, diagnosis, medications. Discussed red flags for immediate return to clinic/ER, as well as indications for follow up if no improvement. Patient understood and agreed to plan. Patient was stable for discharge.  Pt prefers to do PCR test for covid 19 given she had an  test.  Discussed a positive test is positive and no need from my stand point but she prefers.      .      - nirmatrelvir and ritonavir (PAXLOVID) 300 mg/100 mg therapy pack  Dispense: 30 tablet; Refill: 0  - benzonatate (TESSALON) 200 MG capsule  Dispense: 30 capsule; Refill: 0    Acute cough    - Symptomatic COVID-19 Virus (Coronavirus) by PCR Nose  - benzonatate (TESSALON) 200 MG capsule  Dispense: 30 capsule; Refill: 0         [facility]gent [facility] [place]    Lourdes Stanton is a 74 year old female who presents to clinic today for the following health issues:  Chief Complaint   Patient presents with    Chills    Nasal Congestion    Nausea    Cough     Deep into her chest  since Monday afternoon- started taking mucinex with little help. Has lost of taste       HPI  Pt has had sx since 26.  She denies sob, difficulty breathing, chest pain.  She took a home covid 19 test which was positive but was  and would like PCR test.   is developing similar sx.      Review of Systems  Constitutional, HEENT, cardiovascular, pulmonary, gi and gu systems are negative, except as otherwise noted.      Objective    BP (!) 150/86 (BP Location: Right arm, Patient Position: Sitting, Cuff Size: Adult Large)   Pulse 57   Temp 98.8  F (37.1  C) (Tympanic)   Resp 20   Wt 89 kg (196 lb 3.2 oz)   LMP  (LMP  Unknown)   SpO2 95%   BMI 35.89 kg/m    Physical Exam

## 2024-09-14 NOTE — PATIENT INSTRUCTIONS
Paxlovid is no longer free from the government. There are financial assistance programs available. You can learn more at https://paxlovid.[employer]/ or 1-257.136.6087, press 2 for patient options. Please look into this before you go to the pharmacy to  your medicine.      Coronavirus (COVID-19): Care Instructions  What is COVID-19?  COVID-19 is a disease caused by a type of coronavirus. This illness was first found in 2019 and has since spread worldwide (pandemic). Symptoms can range from mild, such as fever and body aches, to severe, including trouble breathing. COVID-19 can be deadly.  Coronaviruses are a large group of viruses. Some types cause the common cold. Others cause more serious illnesses like [place] respiratory syndrome (MERS) and severe acute respiratory syndrome (SARS).  Follow-up care is a key part of your treatment and safety. Be sure to make and go to all appointments, and call your doctor if you are having problems. It's also a good idea to know your test results and keep a list of the medicines you take.  How can you self-isolate when you have COVID-19?  If you have COVID-19, there are things you can do to help avoid spreading the virus to others.  Stay home, and avoid contact with other people.  Limit contact with people in your home. If possible, stay in a separate bedroom and use a separate bathroom.  Wear a high-quality mask when you are around other people.  Improve airflow. If you have to spend time indoors with others, open windows and doors. Or you can use a fan to blow air away from people and out a window.  Avoid contact with pets and other animals.  Cover your mouth and nose with a tissue when you cough or sneeze. Then throw it in the trash right away.  Wash your hands often, especially after you cough or sneeze. Use soap and water, and scrub for at least 20 seconds. If soap and water aren't available, use an alcohol-based hand .  Don't share personal household  items. These include bedding, towels, cups and glasses, and eating utensils.  Wash laundry in the warmest water allowed for the fabric type, and dry it completely. It's okay to wash other people's laundry with yours.  Clean and disinfect your home. Use household  and disinfectant wipes or sprays.  Go to the CDC website at cdc.gov if you have questions.  When can you end self-isolation for COVID-19?  If you know or think that you have the virus, you will need to self-isolate. When you can be around other people you live with and leave home depends on whether you have symptoms. Important: Day 0 is the day your symptoms started or the day you tested positive. Day 1 is the day after your symptoms first started or your test was positive.  If you tested positive but had no symptoms, it's safe to end isolation at the end of Day 5. But if you start to have symptoms, follow the recommendations below, and count your first day of symptoms as Day 0.  If you have symptoms, when you can end isolation depends on how sick you were and your overall health. No matter what, you need to wait until your symptoms are getting better and you haven't had a fever for 24 hours while not taking medicines to lower the fever. Here's how long to isolate, based on your symptoms:  If you were only a little sick: (This means you might have felt really bad but had no shortness of breath and never needed to be in the hospital.) You can end isolation at the end of Day 5.  If you were more sick: (You had some shortness of breath or some trouble breathing but never needed to be in the hospital.) You can end isolation at the end of Day 10.  If you were very sick and needed to be in the hospital, or if you have a weakened immune system: You can end isolation at the end of Day 10 or later. Talk to your doctor to find out when it's safe to end isolation. You may need a viral test.  After you end isolation, if your symptoms come back or get worse:  "Restart your isolation at Day 0. Do this even if it happens after you took medicine for COVID.  Avoid travel and stay away from people at high risk for serious disease for at least 10 days.  Those who can't wear a mask because they are under 2 years old or have certain disabilities should isolate for at least 10 full days.  Call your doctor or seek care if you have questions about your symptoms or when to end isolation.  Go to the CDC website at cdc.gov if you have questions.  Where can you learn more?  Go to https://www.[employer].net/patiented  Enter C007 in the search box to learn more about \"Coronavirus (COVID-19): Care Instructions.\"  Current as of: May 28, 2024  Content Version: 14.1 2006-2024 [employer].   Care instructions adapted under license by your healthcare professional. If you have questions about a medical condition or this instruction, always ask your healthcare professional. [employer] disclaims any warranty or liability for your use of this information.    "

## 2024-09-15 LAB — SARS-COV-2 RNA RESP QL NAA+PROBE: POSITIVE

## 2024-09-24 ENCOUNTER — OFFICE VISIT (OUTPATIENT)
Dept: INTERNAL MEDICINE | Facility: CLINIC | Age: 74
End: 2024-09-24
Payer: COMMERCIAL

## 2024-09-24 VITALS
TEMPERATURE: 99 F | BODY MASS INDEX: 35.61 KG/M2 | HEIGHT: 62 IN | OXYGEN SATURATION: 94 % | SYSTOLIC BLOOD PRESSURE: 138 MMHG | DIASTOLIC BLOOD PRESSURE: 82 MMHG | RESPIRATION RATE: 16 BRPM | HEART RATE: 58 BPM | WEIGHT: 193.5 LBS

## 2024-09-24 DIAGNOSIS — I10 ESSENTIAL HYPERTENSION: ICD-10-CM

## 2024-09-24 DIAGNOSIS — G43.709 CHRONIC MIGRAINE WITHOUT AURA WITHOUT STATUS MIGRAINOSUS, NOT INTRACTABLE: ICD-10-CM

## 2024-09-24 DIAGNOSIS — R23.8 OTHER SKIN CHANGES: ICD-10-CM

## 2024-09-24 DIAGNOSIS — Z00.00 ENCOUNTER FOR MEDICARE ANNUAL WELLNESS EXAM: Primary | ICD-10-CM

## 2024-09-24 DIAGNOSIS — I50.32 CHRONIC DIASTOLIC CONGESTIVE HEART FAILURE (H): ICD-10-CM

## 2024-09-24 DIAGNOSIS — I48.0 PAROXYSMAL ATRIAL FIBRILLATION (H): ICD-10-CM

## 2024-09-24 DIAGNOSIS — Z12.31 ENCOUNTER FOR SCREENING MAMMOGRAM FOR BREAST CANCER: ICD-10-CM

## 2024-09-24 DIAGNOSIS — Z13.220 SCREENING FOR HYPERLIPIDEMIA: ICD-10-CM

## 2024-09-24 DIAGNOSIS — I42.2 HYPERTROPHIC CARDIOMYOPATHY (H): ICD-10-CM

## 2024-09-24 DIAGNOSIS — Z78.0 POSTMENOPAUSAL STATUS: ICD-10-CM

## 2024-09-24 DIAGNOSIS — N18.2 CKD (CHRONIC KIDNEY DISEASE) STAGE 2, GFR 60-89 ML/MIN: ICD-10-CM

## 2024-09-24 DIAGNOSIS — G54.5 PARSONAGE-TURNER SYNDROME: ICD-10-CM

## 2024-09-24 DIAGNOSIS — D44.6 CAROTID BODY PARAGANGLIOMA (H): ICD-10-CM

## 2024-09-24 LAB
ALBUMIN SERPL BCG-MCNC: 4.2 G/DL (ref 3.5–5.2)
ALP SERPL-CCNC: 66 U/L (ref 40–150)
ALT SERPL W P-5'-P-CCNC: 28 U/L (ref 0–50)
ANION GAP SERPL CALCULATED.3IONS-SCNC: 7 MMOL/L (ref 7–15)
AST SERPL W P-5'-P-CCNC: 30 U/L (ref 0–45)
BASOPHILS # BLD AUTO: 0.1 10E3/UL (ref 0–0.2)
BASOPHILS NFR BLD AUTO: 1 %
BILIRUB SERPL-MCNC: 0.6 MG/DL
BUN SERPL-MCNC: 14 MG/DL (ref 8–23)
CALCIUM SERPL-MCNC: 9.6 MG/DL (ref 8.8–10.4)
CHLORIDE SERPL-SCNC: 104 MMOL/L (ref 98–107)
CHOLEST SERPL-MCNC: 219 MG/DL
CREAT SERPL-MCNC: 0.68 MG/DL (ref 0.51–0.95)
EGFRCR SERPLBLD CKD-EPI 2021: >90 ML/MIN/1.73M2
EOSINOPHIL # BLD AUTO: 0.2 10E3/UL (ref 0–0.7)
EOSINOPHIL NFR BLD AUTO: 2 %
ERYTHROCYTE [DISTWIDTH] IN BLOOD BY AUTOMATED COUNT: 13 % (ref 10–15)
FASTING STATUS PATIENT QL REPORTED: NO
FASTING STATUS PATIENT QL REPORTED: NO
GLUCOSE SERPL-MCNC: 90 MG/DL (ref 70–99)
HCO3 SERPL-SCNC: 32 MMOL/L (ref 22–29)
HCT VFR BLD AUTO: 48.2 % (ref 35–47)
HDLC SERPL-MCNC: 65 MG/DL
HGB BLD-MCNC: 15.8 G/DL (ref 11.7–15.7)
IMM GRANULOCYTES # BLD: 0 10E3/UL
IMM GRANULOCYTES NFR BLD: 0 %
LDLC SERPL CALC-MCNC: 132 MG/DL
LYMPHOCYTES # BLD AUTO: 3.5 10E3/UL (ref 0.8–5.3)
LYMPHOCYTES NFR BLD AUTO: 36 %
MAGNESIUM SERPL-MCNC: 2.1 MG/DL (ref 1.7–2.3)
MCH RBC QN AUTO: 28.5 PG (ref 26.5–33)
MCHC RBC AUTO-ENTMCNC: 32.8 G/DL (ref 31.5–36.5)
MCV RBC AUTO: 87 FL (ref 78–100)
MONOCYTES # BLD AUTO: 0.5 10E3/UL (ref 0–1.3)
MONOCYTES NFR BLD AUTO: 5 %
NEUTROPHILS # BLD AUTO: 5.3 10E3/UL (ref 1.6–8.3)
NEUTROPHILS NFR BLD AUTO: 55 %
NONHDLC SERPL-MCNC: 154 MG/DL
PLATELET # BLD AUTO: 182 10E3/UL (ref 150–450)
POTASSIUM SERPL-SCNC: 4.3 MMOL/L (ref 3.4–5.3)
PROT SERPL-MCNC: 7 G/DL (ref 6.4–8.3)
RBC # BLD AUTO: 5.55 10E6/UL (ref 3.8–5.2)
SODIUM SERPL-SCNC: 143 MMOL/L (ref 135–145)
TRIGL SERPL-MCNC: 112 MG/DL
WBC # BLD AUTO: 9.6 10E3/UL (ref 4–11)

## 2024-09-24 PROCEDURE — 83735 ASSAY OF MAGNESIUM: CPT | Performed by: NURSE PRACTITIONER

## 2024-09-24 PROCEDURE — 80061 LIPID PANEL: CPT | Performed by: NURSE PRACTITIONER

## 2024-09-24 PROCEDURE — 80053 COMPREHEN METABOLIC PANEL: CPT | Performed by: NURSE PRACTITIONER

## 2024-09-24 PROCEDURE — G0438 PPPS, INITIAL VISIT: HCPCS | Performed by: NURSE PRACTITIONER

## 2024-09-24 PROCEDURE — 85025 COMPLETE CBC W/AUTO DIFF WBC: CPT | Performed by: NURSE PRACTITIONER

## 2024-09-24 PROCEDURE — 36415 COLL VENOUS BLD VENIPUNCTURE: CPT | Performed by: NURSE PRACTITIONER

## 2024-09-24 RX ORDER — KETOCONAZOLE 20 MG/G
CREAM TOPICAL PRN
COMMUNITY

## 2024-09-24 RX ORDER — TRIAMCINOLONE ACETONIDE 1 MG/G
CREAM TOPICAL PRN
COMMUNITY

## 2024-09-24 RX ORDER — SUMATRIPTAN 50 MG/1
50 TABLET, FILM COATED ORAL PRN
Qty: 10 TABLET | Refills: 2 | Status: SHIPPED | OUTPATIENT
Start: 2024-09-24

## 2024-09-24 SDOH — HEALTH STABILITY: PHYSICAL HEALTH: ON AVERAGE, HOW MANY MINUTES DO YOU ENGAGE IN EXERCISE AT THIS LEVEL?: 120 MIN

## 2024-09-24 ASSESSMENT — PAIN SCALES - GENERAL: PAINLEVEL: NO PAIN (0)

## 2024-09-24 NOTE — PROGRESS NOTES
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 Bili, TP)    Carotid body paraganglioma (H)     Other Visit Diagnoses       Encounter for Medicare annual wellness exam    -  Primary    CKD (chronic kidney disease) stage 2, GFR 60-89 ml/min        Screening for hyperlipidemia        Relevant Orders    Lipid Profile (Chol, Trig, HDL, LDL calc)    Chronic diastolic congestive heart failure (H)        Relevant Orders    CBC with platelets and differential    Comprehensive metabolic panel (BMP + Alb, Alk Phos, ALT, AST, Total. Bili, TP)    Encounter for screening mammogram for breast cancer        Postmenopausal status        Other skin changes        Relevant Medications    ketoconazole (NIZORAL) 2 % external cream    triamcinolone (KENALOG) 0.1 % external cream        Reports skin changes on the right jaw line, upcoming appt in Oct with derm.      Patient had a recent fall and has completed PT and is doing exercises .    Recent positive covid with symptoms starting on 9.9.24 and did take paxlovid a prescribed.      Patient has been advised of split billing requirements and indicates understanding: Yes       BMI  Estimated body mass index is 34.97 kg/m  as calculated from the following:    Height as of this encounter: 1.584 m (5' 2.38\").    Weight as of this encounter: 87.8 kg (193 lb 8 oz).   Weight management plan: Discussed healthy diet and exercise guidelines  BP Readings from Last 6 Encounters:   09/24/24 138/82   09/14/24 (!) 150/86 "   07/16/24 134/82   01/31/24 (!) 165/94   01/24/24 (!) 142/84   01/04/24 133/86       Counseling  Appropriate preventive services were addressed with this patient via screening, questionnaire, or discussion as appropriate for fall prevention, nutrition, physical activity, Tobacco-use cessation, social engagement, weight loss and cognition.  Checklist reviewing preventive services available has been given to the patient.  Reviewed patient's diet, addressing concerns and/or questions.   Discussed possible causes of fatigue. Information on urinary incontinence and treatment options given to patient.     See Patient Instructions      Lourdes Stanton is a 74 year old, presenting for the following:  Wellness Visit (AWV)        9/24/2024     9:13 AM   Additional Questions   Roomed by Jameson         9/24/2024     9:13 AM   Patient Reported Additional Medications   Patient reports taking the following new medications No         Health Care Directive  Patient does not have a Health Care Directive or Living Will: Discussed advance care planning with patient; information given to patient to review.    HPI              9/24/2024   General Health   How would you rate your overall physical health? Good   Feel stress (tense, anxious, or unable to sleep) Not at all            9/24/2024   Nutrition   Diet: Low salt            9/24/2024   Exercise   Days per week of moderate/strenous exercise 7 days   Average minutes spent exercising at this level 120 min            9/24/2024   Social Factors   Frequency of gathering with friends or relatives Three times a week   Worry food won't last until get money to buy more No   Food not last or not have enough money for food? No   Do you have housing? (Housing is defined as stable permanent housing and does not include staying ouside in a car, in a tent, in an abandoned building, in an overnight shelter, or couch-surfing.) Yes   Are you worried about losing your housing? No   Lack of  transportation? No   Unable to get utilities (heat,electricity)? No            9/24/2024   Fall Risk   Fallen 2 or more times in the past year? No   Trouble with walking or balance? No             9/24/2024   Activities of Daily Living- Home Safety   Needs help with the following daily activites None of the above   Safety concerns in the home None of the above            9/24/2024   Dental   Dentist two times every year? Yes            9/24/2024   Hearing Screening   Hearing concerns? None of the above            9/24/2024   Driving Risk Screening   Patient/family members have concerns about driving No            9/24/2024   General Alertness/Fatigue Screening   Have you been more tired than usual lately? (!) YES            9/24/2024   Urinary Incontinence Screening   Bothered by leaking urine in past 6 months Yes            9/24/2024   TB Screening   Were you born outside of the [place]? No            Today's PHQ-2 Score:       9/24/2024     9:09 AM   PHQ-2 ( 1999 Pfizer)   Q1: Little interest or pleasure in doing things 0   Q2: Feeling down, depressed or hopeless 0   PHQ-2 Score 0   Q1: Little interest or pleasure in doing things Not at all   Q2: Feeling down, depressed or hopeless Not at all   PHQ-2 Score 0           9/24/2024   Substance Use   Alcohol more than 3/day or more than 7/wk Not Applicable   Do you have a current opioid prescription? No   How severe/bad is pain from 1 to 10? 1/10   Do you use any other substances recreationally? No        Social History     Tobacco Use    Smoking status: Never     Passive exposure: Current    Smokeless tobacco: Never   Vaping Use    Vaping status: Never Used   Substance Use Topics    Alcohol use: Not Currently     Comment: rarely    Drug use: Never          Mammogram Screening - Mammogram every 1-2 years updated in Health Maintenance based on mutual decision making    ASCVD Risk   The 10-year ASCVD risk score (Luzmaria COFFMAN, et al., 2019) is: 17.6%    Values used to  calculate the score:      Age: 74 years      Sex: Female      Is Non- : No      Diabetic: No      Tobacco smoker: No      Systolic Blood Pressure: 138 mmHg      Is BP treated: Yes      HDL Cholesterol: 68 mg/dL      Total Cholesterol: 208 mg/dL    Fracture Risk Assessment Tool  Link to Frax Calculator  Use the information below to complete the Frax calculator  : 1950  Sex: female  Weight (kg): 87.8 kg (actual weight)  Height (cm): 158.4 cm  Previous Fragility Fracture:  No  History of parent with fractured hip:  No  Current Smoking:  No  Patient has been on glucocorticoids for more than 3 months (5mg/day or more): No  Rheumatoid Arthritis on Problem List:  No  Secondary Osteoporosis on Problem List:  No  Consumes 3 or more units of alcohol per day: No  Femoral Neck BMD (g/cm2)            Reviewed and updated as needed this visit by Provider     Meds                Lab work is in process  Current providers sharing in care for this patient include:  Patient Care Team:  Diana Coello NP as PCP - General  Hossein Arnold MD as Assigned Heart and Vascular Provider  Diana Coello NP as Assigned PCP  Dawson Orr MD as MD (Otolaryngology)  Dawson Orr MD as Assigned Surgical Provider    The following health maintenance items are reviewed in Epic and correct as of today:  Health Maintenance   Topic Date Due    HF ACTION PLAN  Never done    SPIROMETRY  Never done    COPD ACTION PLAN  Never done    BMP  2024    INFLUENZA VACCINE (1) 10/22/2024 (Originally 2024)    COVID-19 Vaccine ( season) 10/29/2024 (Originally 2024)    ALT  2025    LIPID  2025    ANNUAL REVIEW OF HM ORDERS  2025    CBC  2025    MAMMO SCREENING  2025    MEDICARE ANNUAL WELLNESS VISIT  2025    FALL RISK ASSESSMENT  2025    GLUCOSE  2027    DEXA  2027    COLORECTAL CANCER SCREENING  2028    ADVANCE CARE PLANNING   "09/24/2029    DTAP/TDAP/TD IMMUNIZATION (3 - Td or Tdap) 07/29/2030    TSH W/FREE T4 REFLEX  Completed    PHQ-2 (once per calendar year)  Completed    Pneumococcal Vaccine: 65+ Years  Completed    ZOSTER IMMUNIZATION  Completed    RSV VACCINE  Completed    HPV IMMUNIZATION  Aged Out    MENINGITIS IMMUNIZATION  Aged Out    RSV MONOCLONAL ANTIBODY  Aged Out    HEPATITIS C SCREENING  Discontinued         Review of Systems  Constitutional, HEENT, cardiovascular, pulmonary, gi and gu systems are negative, except as otherwise noted.     Objective    Exam  /82   Pulse 58   Temp 99  F (37.2  C) (Temporal)   Resp 16   Ht 1.584 m (5' 2.38\")   Wt 87.8 kg (193 lb 8 oz)   LMP  (LMP Unknown)   SpO2 94%   BMI 34.97 kg/m     Estimated body mass index is 34.97 kg/m  as calculated from the following:    Height as of this encounter: 1.584 m (5' 2.38\").    Weight as of this encounter: 87.8 kg (193 lb 8 oz).    Physical Exam  GENERAL: alert and no distress  MS: no gross musculoskeletal defects noted, no edema  Respirations regular, non labored         9/24/2024   Mini Cog   Clock Draw Score 2 Normal   3 Item Recall 3 objects recalled   Mini Cog Total Score 5                 Signed Electronically by: Diana Coello NP    "

## 2024-09-24 NOTE — PATIENT INSTRUCTIONS
Patient Education   Preventive Care Advice   This is general advice given by our system to help you stay healthy. However, your care team may have specific advice just for you. Please talk to your care team about your preventive care needs.  Nutrition  Eat 5 or more servings of fruits and vegetables each day.  Try wheat bread, brown rice and whole grain pasta (instead of white bread, rice, and pasta).  Get enough calcium and vitamin D. Check the label on foods and aim for 100% of the RDA (recommended daily allowance).  Lifestyle  Exercise at least 150 minutes each week  (30 minutes a day, 5 days a week).  Do muscle strengthening activities 2 days a week. These help control your weight and prevent disease.  No smoking.  Wear sunscreen to prevent skin cancer.  Have a dental exam and cleaning every 6 months.  Yearly exams  See your health care team every year to talk about:  Any changes in your health.  Any medicines your care team has prescribed.  Preventive care, family planning, and ways to prevent chronic diseases.  Shots (vaccines)   HPV shots (up to age 26), if you've never had them before.  Hepatitis B shots (up to age 59), if you've never had them before.  COVID-19 shot: Get this shot when it's due.  Flu shot: Get a flu shot every year.  Tetanus shot: Get a tetanus shot every 10 years.  Pneumococcal, hepatitis A, and RSV shots: Ask your care team if you need these based on your risk.  Shingles shot (for age 50 and up)  General health tests  Diabetes screening:  Starting at age 35, Get screened for diabetes at least every 3 years.  If you are younger than age 35, ask your care team if you should be screened for diabetes.  Cholesterol test: At age 39, start having a cholesterol test every 5 years, or more often if advised.  Bone density scan (DEXA): At age 50, ask your care team if you should have this scan for osteoporosis (brittle bones).  Hepatitis C: Get tested at least once in your life.  STIs (sexually  transmitted infections)  Before age 24: Ask your care team if you should be screened for STIs.  After age 24: Get screened for STIs if you're at risk. You are at risk for STIs (including HIV) if:  You are sexually active with more than one person.  You don't use condoms every time.  You or a partner was diagnosed with a sexually transmitted infection.  If you are at risk for HIV, ask about PrEP medicine to prevent HIV.  Get tested for HIV at least once in your life, whether you are at risk for HIV or not.  Cancer screening tests  Cervical cancer screening: If you have a cervix, begin getting regular cervical cancer screening tests starting at age 21.  Breast cancer scan (mammogram): If you've ever had breasts, begin having regular mammograms starting at age 40. This is a scan to check for breast cancer.  Colon cancer screening: It is important to start screening for colon cancer at age 45.  Have a colonoscopy test every 10 years (or more often if you're at risk) Or, ask your provider about stool tests like a FIT test every year or Cologuard test every 3 years.  To learn more about your testing options, visit:   .  For help making a decision, visit:   https://bit.ly/wx50583.  Prostate cancer screening test: If you have a prostate, ask your care team if a prostate cancer screening test (PSA) at age 55 is right for you.  Lung cancer screening: If you are a current or former smoker ages 50 to 80, ask your care team if ongoing lung cancer screenings are right for you.  For informational purposes only. Not to replace the advice of your health care provider. Copyright   2023 [facility]. All rights reserved. Clinically reviewed by the [facility]. [employer] [id number] - REV 01/24.  Learning About Sleeping Well  What does sleeping well mean?     Sleeping well means getting enough sleep to feel good and stay healthy. How much sleep is enough varies among people.  The number of hours you  sleep and how you feel when you wake up are both important. If you do not feel refreshed, you probably need more sleep. Another sign of not getting enough sleep is feeling tired during the day.  Experts recommend that adults get at least 7 or more hours of sleep per day. Children and older adults need more sleep.  Why is getting enough sleep important?  Getting enough quality sleep is a basic part of good health. When your sleep suffers, your physical health, mood, and your thoughts can suffer too. You may find yourself feeling more grumpy or stressed. Not getting enough sleep also can lead to serious problems, including injury, accidents, anxiety, and depression.  What might cause poor sleeping?  Many things can cause sleep problems, including:  Changes to your sleep schedule.  Stress. Stress can be caused by fear about a single event, such as giving a speech. Or you may have ongoing stress, such as worry about work or school.  Depression, anxiety, and other mental or emotional conditions.  Changes in your sleep habits or surroundings. This includes changes that happen where you sleep, such as noise, light, or sleeping in a different bed. It also includes changes in your sleep pattern, such as having jet lag or working a late shift.  Health problems, such as pain, breathing problems, and restless legs syndrome.  Lack of regular exercise.  Using alcohol, nicotine, or caffeine before bed.  How can you help yourself?  Here are some tips that may help you sleep more soundly and wake up feeling more refreshed.  Your sleeping area   Use your bedroom only for sleeping and sex. A bit of light reading may help you fall asleep. But if it doesn't, do your reading elsewhere in the house. Try not to use your TV, computer, smartphone, or tablet while you are in bed.  Be sure your bed is big enough to stretch out comfortably, especially if you have a sleep partner.  Keep your bedroom quiet, dark, and cool. Use curtains, blinds,  "or a sleep mask to block out light. To block out noise, use earplugs, soothing music, or a \"white noise\" machine.  Your evening and bedtime routine   Create a relaxing bedtime routine. You might want to take a warm shower or bath, or listen to soothing music.  Go to bed at the same time every night. And get up at the same time every morning, even if you feel tired.  What to avoid   Limit caffeine (coffee, tea, caffeinated sodas) during the day, and don't have any for at least 6 hours before bedtime.  Avoid drinking alcohol before bedtime. Alcohol can cause you to wake up more often during the night.  Try not to smoke or use tobacco, especially in the evening. Nicotine can keep you awake.  Limit naps during the day, especially close to bedtime.  Avoid lying in bed awake for too long. If you can't fall asleep or if you wake up in the middle of the night and can't get back to sleep within about 20 minutes, get out of bed and go to another room until you feel sleepy.  Avoid taking medicine right before bed that may keep you awake or make you feel hyper or energized. Your doctor can tell you if your medicine may do this and if you can take it earlier in the day.  If you can't sleep   Imagine yourself in a peaceful, pleasant scene. Focus on the details and feelings of being in a place that is relaxing.  Get up and do a quiet or boring activity until you feel sleepy.  Avoid drinking any liquids before going to bed to help prevent waking up often to use the bathroom.  Where can you learn more?  Go to https://www.[employer].net/patiented  Enter J942 in the search box to learn more about \"Learning About Sleeping Well.\"  Current as of: July 10, 2023  Content Version: 14.1 2006-2024 [employer].   Care instructions adapted under license by your healthcare professional. If you have questions about a medical condition or this instruction, always ask your healthcare professional. [employer] disclaims " any warranty or liability for your use of this information.    Bladder Training: Care Instructions  Your Care Instructions     Bladder training is used to treat urge incontinence and stress incontinence. Urge incontinence means that the need to urinate comes on so fast that you can't get to a toilet in time. Stress incontinence means that you leak urine because of pressure on your bladder. For example, it may happen when you laugh, cough, or lift something heavy.  Bladder training can increase how long you can wait before you have to urinate. It can also help your bladder hold more urine. And it can give you better control over the urge to urinate.  It is important to remember that bladder training takes a few weeks to a few months to make a difference. You may not see results right away, but don't give up.  Follow-up care is a key part of your treatment and safety. Be sure to make and go to all appointments, and call your doctor if you are having problems. It's also a good idea to know your test results and keep a list of the medicines you take.  How can you care for yourself at home?  Work with your doctor to come up with a bladder training program that is right for you. You may use one or more of the following methods.  Delayed urination  In the beginning, try to keep from urinating for 5 minutes after you first feel the need to go.  While you wait, take deep, slow breaths to relax. Kegel exercises can also help you delay the need to go to the bathroom.  After some practice, when you can easily wait 5 minutes to urinate, try to wait 10 minutes before you urinate.  Slowly increase the waiting period until you are able to control when you have to urinate.  Scheduled urination  Empty your bladder when you first wake up in the morning.  Schedule times throughout the day when you will urinate.  Start by going to the bathroom every hour, even if you don't need to go.  Slowly increase the time between trips to the  "bathroom.  When you have found a schedule that works well for you, keep doing it.  If you wake up during the night and have to urinate, do it. Apply your schedule to waking hours only.  Kegel exercises  These tighten and strengthen pelvic muscles, which can help you control the flow of urine. (If doing these exercises causes pain, stop doing them and talk with your doctor.) To do Kegel exercises:  Squeeze your muscles as if you were trying not to pass gas. Or squeeze your muscles as if you were stopping the flow of urine. Your belly, legs, and buttocks shouldn't move.  Hold the squeeze for 3 seconds, then relax for 5 to 10 seconds.  Start with 3 seconds, then add 1 second each week until you are able to squeeze for 10 seconds.  Repeat the exercise 10 times a session. Do 3 to 8 sessions a day.  When should you call for help?  Watch closely for changes in your health, and be sure to contact your doctor if:    Your incontinence is getting worse.     You do not get better as expected.   Where can you learn more?  Go to https://www.[employer].net/patiented  Enter V684 in the search box to learn more about \"Bladder Training: Care Instructions.\"  Current as of: November 15, 2023               Content Version: 14.0    1513-0504 [employer].   Care instructions adapted under license by your healthcare professional. If you have questions about a medical condition or this instruction, always ask your healthcare professional. [employer] disclaims any warranty or liability for your use of this information.         "

## 2024-10-01 ENCOUNTER — PATIENT OUTREACH (OUTPATIENT)
Dept: GASTROENTEROLOGY | Facility: CLINIC | Age: 74
End: 2024-10-01
Payer: COMMERCIAL

## 2024-10-10 ENCOUNTER — OFFICE VISIT (OUTPATIENT)
Dept: CARDIOLOGY | Facility: CLINIC | Age: 74
End: 2024-10-10
Payer: COMMERCIAL

## 2024-10-10 VITALS
RESPIRATION RATE: 14 BRPM | WEIGHT: 192 LBS | BODY MASS INDEX: 34.7 KG/M2 | DIASTOLIC BLOOD PRESSURE: 74 MMHG | SYSTOLIC BLOOD PRESSURE: 132 MMHG | HEART RATE: 57 BPM

## 2024-10-10 DIAGNOSIS — I10 ESSENTIAL HYPERTENSION: ICD-10-CM

## 2024-10-10 DIAGNOSIS — Z98.890 HISTORY OF LOOP RECORDER: ICD-10-CM

## 2024-10-10 DIAGNOSIS — I48.0 PAF (PAROXYSMAL ATRIAL FIBRILLATION) (H): Primary | ICD-10-CM

## 2024-10-10 DIAGNOSIS — I50.32 CHRONIC DIASTOLIC CONGESTIVE HEART FAILURE (H): ICD-10-CM

## 2024-10-10 DIAGNOSIS — I42.2 HYPERTROPHIC CARDIOMYOPATHY (H): ICD-10-CM

## 2024-10-10 PROCEDURE — 99214 OFFICE O/P EST MOD 30 MIN: CPT | Performed by: INTERNAL MEDICINE

## 2024-10-10 PROCEDURE — G2211 COMPLEX E/M VISIT ADD ON: HCPCS | Performed by: INTERNAL MEDICINE

## 2024-10-10 RX ORDER — METOPROLOL SUCCINATE 50 MG/1
50 TABLET, EXTENDED RELEASE ORAL 2 TIMES DAILY
Qty: 180 TABLET | Refills: 3 | Status: SHIPPED | OUTPATIENT
Start: 2024-10-10

## 2024-10-10 NOTE — PATIENT INSTRUCTIONS
It was a pleasure to meet with you today.      Below is a summary of your visit.   I don't think we need to replace your loop recorder with a new one. You can choose whether to remove the one with the dead battery or to leave it in your body. Either way is completely acceptable and is completely your choice.  Continue your medications without changes.  Continue regular exercise  Follow up in a year with an echo prior.    We will call you to inform you of your test or procedure results within 3 business days of the test being performed.  If you do not hear from our office with the test results within 1 week please do not hesitate to call asking for these results.    Please do not hesitate to call the [facility] with any questions or concerns at [phone]. You can also reach my nurse, Magui, at [phone].    Sincerely,

## 2024-10-10 NOTE — PROGRESS NOTES
[facility]   [address]  [place] [address]   [email or web address]   OFFICE: [phone]     CARDIOLOGY CLINIC NOTE     Thank you, Diana Mahoney, for asking the [facility] team to see Ms. Romy Izquierdo to Follow Up         Assessment/Recommendations   Assessment:    Hypertrophic cardiomyopathy with dynamic LVOT gradient -  Now s/p septal myectomy at [facility] on 5/26/21. Stable with LV septal wall thickness of 16 mm.   Hypertension - well-controlled.  Paroxysmal atrial fibrillation - with RVR - s/p surgical pulmonary vein isolation. No subsequent afib on ILR interrogation.  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 Confirmed to be ligated by CT. Off of OAC and on aspirin alone.  Mild coronary artery disease with stenosis of 20-30% in all major coronary arteries - stable without angina.  Parsonage-monet syndrome with paralyzed right hemidiaphragm. With mild chronic dyspnea on exertion that is stable.  chronic congestive heart failure with preserved LVEF - currently compensated  Implantable loop recorder at [facility] - with no afib since her PVI and that she will likely have symptoms should it return I don't see an indication to replace the ILR. We discussed options of explant vs abandonment of the battery depleted ILR. Romy will discuss with her  and let us know what she wants to do.    Plan:  Continue medications without changes.  Romy to let us know what she wants to do with her ILR.  Continue regular exercise  Follow up in 1 year with an echo prior or sooner if needed.    The longitudinal plan of care for the diagnosis(es)/condition(s) as documented were addressed during this visit. Due to the added complexity in care, I will continue to support Romy in the subsequent management and with ongoing continuity of care.         History of Present Illness   Ms. Romy Izquierdo is a 74 year old female with a  significant past history of hypertension who presents for follow-up of HOCM and atrial fibrillation.     Ms. Perez has hypertrophic obstructive cardiomyopathy with a prior echo performed on 8/20/2020 that demonstrated progression of the LVOT gradient, peaking at 100 mmHg, which is increased from 40 mmHg a year ago. This does not result in significant mitral valve regurgitation but asymmetric septal hypertrophy was noted. 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 A cardiac MRI confirmed the diagnosis of HOCM. With an LVOT peak gradient of 80 mmHg and paroxysmal atrial fibrillation she was referred for septal myectomy. Her surgery occurred on 5/26/21 and consisted of a septal myectomy with pulmonary vein isolation and left atrial appendage excision. Her post-operative course was notable for paroxysmal atrial fibrillation treated with amiodarone for several weeks. She has not had recurrent afib and is monitored with an implantable loop recorder.    Romy is feeling generally well today. She fell on the sidewalk mechanical fall) and broke her kneecap. Recovering from this. Otherwise exercising for about an hour per day.    Other than noted above, Ms. Izquierdo denies any chest pain/pressure/tightness, shortness of breath at rest or with exertion, light headedness/dizziness, pre-syncope, syncope, lower extremity swelling, palpitations, paroxysmal nocturnal dyspnea (PND), or orthopnea.     Cardiac Problems and Cardiac Diagnostics     Most Recent Cardiac testing:  ECG dated 1/12/23 (personaly reviewed and interpreted): sinus rhythm with first degree AV block, left axis deviation, and LBBB    Implantable loop recorder 8/13/24  Type: alert remote loop recorder transmission for device at [facility]. Courtesy check.   Device: Medtronic Reveal Linq.   Presenting rhythm: Sinus 60 bpm.   Battery status: device reached RRT on 8/10/24.   Arrhythmias: since 7/31/24; none  detected.  Plan: Routed to device SUSANA Santana, Device Specialist  ADD: SAM check list complete. See Epic note.     Cardiac rhythm monitor 11/16/2020  CONCLUSION:  Symptoms of heart racing on 1 occasion associated with atrial flutter.   Symptoms of shortness of breath on 9 occasions, associated with sinus rhythm and  first-degree AV block. Paroxysmal atrial fibrillation was present on 2 days with a  total of 5 hours and 6 hours, respectively with average ventricular response of  approximately 85 beats per minute and peak ventricular response of approximately 110  beats per minute.  A single short run of accelerated idioventricular rhythm was  noted on auto transmissions.  Rather marked nocturnal sinus bradycardia with heart  rates in the 30s were noted on multiple days.     ECHO (report reviewed):   TTE 10/3/23  1.Left ventricular size, wall motion and function are normal. The ejection fraction is 60-65%.  2.There is mild to moderate concentric left ventricular hypertrophy.  3.Normal right ventricle size and systolic function.  4.No hemodynamically significant valvular abnormalities on 2D or color flow imaging.  5.The ascending aorta is Mildly dilated.  There is no comparison study available.     Cardiac MRI 9/21/2020  IMPRESSIONS:    1.  Normal left ventricular size, thickened septum with maximal measurement 18-20 mm, inferolateral wall 7-8 mm. The quantified left ventricular ejection fraction is 69%. It appears less deformation of thickened septum per tagging images. There is no rest perfusion defect. It is evident that there is LVOT obstruction by flow turbulence and EMELYN.  The late delayed gadolinium enhancement study demonstrated that there are patchy gadolinium enhancement in thickened basal septal segment and also in basal lateral segment. Put all together, the findings are consistent with hypertrophic cardiomyopathy.   2.  Normal right ventricular size function.    3.  Mildly enlarged both atria.  4.   Moderate mitral valve regurgitation.  5.  Mildly dilated mid ascending aorta 40 mm.     Cardiac cath: from 5/25/21 at [facility] demonstrated   1.  CORONARY ANGIOGRAPHY   FINAL DIAGNOSIS   1.  Mild coronary artery atherosclerosis   PRE-PROCEDURE DIAGNOSIS   1.  Cardiomyopathy Hypertrophic (HCC)   CORONARY DIAGNOSTIC SUMMARY   Coronary artery dominance is right. Normal right coronary.   The left main coronary artery is 30% obstructed by a discrete lesion and 20% obstructed by a discrete lesion.   The proximal left anterior descending artery is 20% obstructed by a discrete lesion.   The middle left anterior descending artery is 20% obstructed by a tubular lesion.   The distal left anterior descending artery is 20% obstructed by a discrete lesion.   Noted systolic compression of septal  arteries, likely consistent with patient's known hypertrophic cardiomyopathy.      CT pulmonary vein 9/2/22  Complete surgical excision of the left atrial appendage.  No evidence of intracardiac thrombus.  Status post surgical myectomy of the left ventricular septum.  No significant coronary artery disease.         Medications  Allergies   Current Outpatient Medications   Medication Sig Dispense Refill    aspirin (ASA) 81 MG EC tablet Take 1 tablet (81 mg) by mouth daily 90 tablet 3    Biotin 10 MG CAPS Take 1 capsule by mouth daily      calcium-vitamin D-vitamin K (VIACTIV) 500-500-40 MG-UNT-MCG CHEW Take 2 tablets by mouth daily.      Cetaphil Moisturizing (CETAPHIL) external lotion Apply 1 Application topically daily      cycloSPORINE (RESTASIS) 0.05 % ophthalmic emulsion Place 1 drop into both eyes 2 times daily      furosemide (LASIX) 20 MG tablet Take 2 tablets (40 mg) by mouth daily as needed (fluid retention) (Patient taking differently: Take 20 mg by mouth daily as needed (fluid retention).) 20 tablet 11    ketoconazole (NIZORAL) 2 % external cream Apply topically as needed.      metoprolol succinate ER (TOPROL XL)  50 MG 24 hr tablet Take 1 tablet (50 mg) by mouth 2 times daily. 180 tablet 3    multivitamin  with lutein (OCUVITE WITH LUTEIN) CAPS per capsule Take 1 capsule by mouth daily      multivitamin (CENTRUM SILVER) tablet Take 2 tablets by mouth daily      Omega-3 Fatty Acids (RA FISH OIL) 1000 MG CAPS 2 capsules in the morning and 1 capsule at night      psyllium (METAMUCIL/KONSYL) Packet Take 1 packet by mouth daily      sodium chloride (OCEAN) 0.65 % nasal spray Spray 1 spray into both nostrils daily as needed for congestion      SUMAtriptan (IMITREX) 50 MG tablet Take 1 tablet (50 mg) by mouth as needed for migraine. 10 tablet 2    triamcinolone (KENALOG) 0.1 % external cream Apply topically as needed.      Turmeric Curcumin 500 MG CAPS Take 1 tablet by mouth daily      benzonatate (TESSALON) 200 MG capsule Take 1 capsule (200 mg) by mouth 3 times daily as needed for cough. 30 capsule 0      Allergies   Allergen Reactions    Shellfish Allergy Shortness Of Breath    Midazolam Hives     Welts    Acetaminophen     Chlorpheniramine Hives    Dextromethorphan Hives    Doxylamine Hives    Dust Mites Other (See Comments)     Stuffy nose    Lactose     Molds & Smuts      Other reaction(s): Wheezing  [place] blue grass,dust    Pseudoephedrine Hives    Trelegy Ellipta [Fluticasone-Umeclidin-Vilant] Swelling     Throat swelling    Adhesive Tape Rash    Latex Rash        Physical Examination Review of Systems   Vitals: /74   Pulse 57   Resp 14   Wt 87.1 kg (192 lb)   LMP  (LMP Unknown)   BMI 34.70 kg/m    BMI= Body mass index is 34.7 kg/m .  Wt Readings from Last 3 Encounters:   10/10/24 87.1 kg (192 lb)   09/24/24 87.8 kg (193 lb 8 oz)   09/14/24 89 kg (196 lb 3.2 oz)       General: pleasant female. No acute distress.   Neck: No JVD  Lungs: clear to auscultation  COR: regular rate and rhythm, 2/6 systolic flow murmur.  Extrem: No edema        Please refer above for cardiac ROS details.       Past History   Past  Medical History:   Past Medical History:   Diagnosis Date    Hypertension     Hypertrophic cardiomyopathy (H) 01/15/2021    Severe outflow tract obstruction Preserved LV systolic performance Basal septum: 20 mm Gadolinium enhancement positive: Basal septum No major positive and no other minor positive risk factors identified.    Obese     Paroxysmal atrial fibrillation (H) 01/15/2021    Dx Nov 2020 (NAIF) [id number] score = 3 Rx apixaban    Parsonage-Mcknight syndrome         Past Surgical History:   Past Surgical History:   Procedure Laterality Date    COLONOSCOPY N/A 1/26/2023    Procedure: COLONOSCOPY with polypectomy;  Surgeon: Kwadwo Kline MD;  Location: [facility] Main OR    EP LOOP RECORDER IMPLANT N/A 1/29/2021    Procedure: EP Loop Recorder Insertion;  Surgeon: Paul Garza MD;  Location: [facility];  Service: Cardiology    EXCISE BREAST CYST/FIBROADENOMA/TUMOR/DUCT LESION/NIPPLE LESION/AREOLAR LESION      Description: Breast Surgery Lumpectomy;  Recorded: 03/07/2012;  Comments: adenoma    EXPLORE NECK N/A 1/22/2024    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  **Latex Allergy**;  Surgeon: Dawson Orr MD;  Location: [facility] OR    HC EXCISION NASAL POLYP(S), EXTENSIVE      Description: Extensive Excision Of Nasal Polyps;  Recorded: 03/07/2012;    HC KNEE SCOPE, DIAGNOSTIC      Description: Arthroscopy Knee Left;  Recorded: 03/07/2012;    IR LUMBAR PUNCTURE  8/22/2012    AK EXCIS TENDON SHEATH LESION, HAND/FINGER      Description: Hand Excision Of A Tendon Cyst;  Recorded: 03/07/2012;    US GUIDED NEEDLE PLACEMENT  7/20/2020    ZZC REMOVAL OF OVARY(S)      Description: Oophorectomy;  Recorded: 06/26/2013;        Family History:   Family History   Problem Relation Age of Onset    Pacemaker Father     Coronary Artery Disease Father 65.00    Colon Cancer Father         XRT and chemo    Heart Failure Father 79.00    Cerebrovascular Disease Mother 89.00    Hypertension Mother      Endometrial Cancer Mother     Other - See Comments Mother         Scarlet fever vs rheumatic fever    Other - See Comments Cousin 16.00        Drown, Hypertrophic (apparently via father - not realted)    Cardiomyopathy Cousin         Hypertrophic (apparently via father - not realted)    Cardiomyopathy Child         Hypertrophic (apparently via father - not realted)    Sudden Death No family hx of         Social History:   Social History     Socioeconomic History    Marital status:      Spouse name: Not on file    Number of children: Not on file    Years of education: Not on file    Highest education level: Not on file   Occupational History    Not on file   Tobacco Use    Smoking status: Never     Passive exposure: Current    Smokeless tobacco: Never   Vaping Use    Vaping status: Never Used   Substance and Sexual Activity    Alcohol use: Not Currently     Comment: rarely    Drug use: Never    Sexual activity: Not Currently   Other Topics Concern    Not on file   Social History Narrative    Not on file     Social Determinants of Health     Financial Resource Strain: Low Risk  (9/24/2024)    Financial Resource Strain     Within the past 12 months, have you or your family members you live with been unable to get utilities (heat, electricity) when it was really needed?: No   Food Insecurity: Low Risk  (9/24/2024)    Food Insecurity     Within the past 12 months, did you worry that your food would run out before you got money to buy more?: No     Within the past 12 months, did the food you bought just not last and you didn t have money to get more?: No   Transportation Needs: Low Risk  (9/24/2024)    Transportation Needs     Within the past 12 months, has lack of transportation kept you from medical appointments, getting your medicines, non-medical meetings or appointments, work, or from getting things that you need?: No   Physical Activity: Sufficiently Active (9/24/2024)    Exercise Vital Sign     Days of  Exercise per Week: 7 days     Minutes of Exercise per Session: 120 min   Stress: No Stress Concern Present (9/24/2024)    [place] [employer] - Occupational Stress Questionnaire     Feeling of Stress : Not at all   Social Connections: Unknown (9/24/2024)    Social Connection and Isolation Panel [NHANES]     Frequency of Communication with Friends and Family: Not on file     Frequency of Social Gatherings with Friends and Family: Three times a week     Attends Zoroastrianism Services: Not on file     Active Member of Clubs or Organizations: Not on file     Attends Club or Organization Meetings: Not on file     Marital Status: Not on file   Interpersonal Safety: Low Risk  (9/24/2024)    Interpersonal Safety     Do you feel physically and emotionally safe where you currently live?: Yes     Within the past 12 months, have you been hit, slapped, kicked or otherwise physically hurt by someone?: No     Within the past 12 months, have you been humiliated or emotionally abused in other ways by your partner or ex-partner?: No   Housing Stability: Low Risk  (9/24/2024)    Housing Stability     Do you have housing? : Yes     Are you worried about losing your housing?: No            Lab Results    Chemistry/lipid CBC Cardiac Enzymes/BNP/TSH/INR   Lab Results   Component Value Date    CHOL 219 (H) 09/24/2024    HDL 65 09/24/2024    TRIG 112 09/24/2024    BUN 14.0 09/24/2024     09/24/2024    CO2 32 (H) 09/24/2024    Lab Results   Component Value Date    WBC 9.6 09/24/2024    HGB 15.8 (H) 09/24/2024    HCT 48.2 (H) 09/24/2024    MCV 87 09/24/2024     09/24/2024    Lab Results   Component Value Date    TROPONINI 0.03 07/23/2021     (H) 07/23/2021    TSH 2.48 05/27/2020

## 2024-10-10 NOTE — LETTER
10/10/2024    Diana Coello, CLIVE  [address] [facility] [address]    RE: Romy JONES Timbo       Dear Colleague,     I had the pleasure of seeing Romy Izquierdo in the [facility].    [facility]   [address]  [place] [address]   [email or web address]   OFFICE: [phone]     CARDIOLOGY CLINIC NOTE     Thank you, Diana Mahoney, for asking the [facility] Care team to see Ms. Romy Izquierdo to Follow Up         Assessment/Recommendations   Assessment:    Hypertrophic cardiomyopathy with dynamic LVOT gradient -  Now s/p septal myectomy at [facility] on 5/26/21. Stable with LV septal wall thickness of 16 mm.   Hypertension - well-controlled.  Paroxysmal atrial fibrillation - with RVR - s/p surgical pulmonary vein isolation. No subsequent afib on ILR interrogation.  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 Confirmed to be ligated by CT. Off of OAC and on aspirin alone.  Mild coronary artery disease with stenosis of 20-30% in all major coronary arteries - stable without angina.  Parsonage-monet syndrome with paralyzed right hemidiaphragm. With mild chronic dyspnea on exertion that is stable.  chronic congestive heart failure with preserved LVEF - currently compensated  Implantable loop recorder at [facility] - with no afib since her PVI and that she will likely have symptoms should it return I don't see an indication to replace the ILR. We discussed options of explant vs abandonment of the battery depleted ILR. Romy will discuss with her  and let us know what she wants to do.    Plan:  Continue medications without changes.  Romy to let us know what she wants to do with her ILR.  Continue regular exercise  Follow up in 1 year with an echo prior or sooner if needed.    The longitudinal plan of care for the diagnosis(es)/condition(s) as documented were addressed during this visit.  Due to the added complexity in care, I will continue to support Romy in the subsequent management and with ongoing continuity of care.         History of Present Illness   Ms. Romy Izquierdo is a 74 year old female with a significant past history of hypertension who presents for follow-up of HOCM and atrial fibrillation.     Ms. Perez has hypertrophic obstructive cardiomyopathy with a prior echo performed on 8/20/2020 that demonstrated progression of the LVOT gradient, peaking at 100 mmHg, which is increased from 40 mmHg a year ago. This does not result in significant mitral valve regurgitation but asymmetric septal hypertrophy was noted. 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 A cardiac MRI confirmed the diagnosis of HOCM. With an LVOT peak gradient of 80 mmHg and paroxysmal atrial fibrillation she was referred for septal myectomy. Her surgery occurred on 5/26/21 and consisted of a septal myectomy with pulmonary vein isolation and left atrial appendage excision. Her post-operative course was notable for paroxysmal atrial fibrillation treated with amiodarone for several weeks. She has not had recurrent afib and is monitored with an implantable loop recorder.    Romy is feeling generally well today. She fell on the sidewalk mechanical fall) and broke her kneecap. Recovering from this. Otherwise exercising for about an hour per day.    Other than noted above, Ms. Izquierdo denies any chest pain/pressure/tightness, shortness of breath at rest or with exertion, light headedness/dizziness, pre-syncope, syncope, lower extremity swelling, palpitations, paroxysmal nocturnal dyspnea (PND), or orthopnea.     Cardiac Problems and Cardiac Diagnostics     Most Recent Cardiac testing:  ECG dated 1/12/23 (personaly reviewed and interpreted): sinus rhythm with first degree AV block, left axis deviation, and LBBB    Implantable loop recorder  8/13/24  Type: alert remote loop recorder transmission for device at RRT. Courtesy check.   Device: Medtronic Reveal Linq.   Presenting rhythm: Sinus 60 bpm.   Battery status: device reached RRT on 8/10/24.   Arrhythmias: since 7/31/24; none detected.  Plan: Routed to device SUSANA Santana, Device Specialist  ADD: SAM check list complete. See Epic note. CL    Cardiac rhythm monitor 11/16/2020  CONCLUSION:  Symptoms of heart racing on 1 occasion associated with atrial flutter.   Symptoms of shortness of breath on 9 occasions, associated with sinus rhythm and  first-degree AV block. Paroxysmal atrial fibrillation was present on 2 days with a  total of 5 hours and 6 hours, respectively with average ventricular response of  approximately 85 beats per minute and peak ventricular response of approximately 110  beats per minute.  A single short run of accelerated idioventricular rhythm was  noted on auto transmissions.  Rather marked nocturnal sinus bradycardia with heart  rates in the 30s were noted on multiple days.     ECHO (report reviewed):   TTE 10/3/23  1.Left ventricular size, wall motion and function are normal. The ejection fraction is 60-65%.  2.There is mild to moderate concentric left ventricular hypertrophy.  3.Normal right ventricle size and systolic function.  4.No hemodynamically significant valvular abnormalities on 2D or color flow imaging.  5.The ascending aorta is Mildly dilated.  There is no comparison study available.     Cardiac MRI 9/21/2020  IMPRESSIONS:    1.  Normal left ventricular size, thickened septum with maximal measurement 18-20 mm, inferolateral wall 7-8 mm. The quantified left ventricular ejection fraction is 69%. It appears less deformation of thickened septum per tagging images. There is no rest perfusion defect. It is evident that there is LVOT obstruction by flow turbulence and EMELYN.  The late delayed gadolinium enhancement study demonstrated that there are patchy gadolinium  enhancement in thickened basal septal segment and also in basal lateral segment. Put all together, the findings are consistent with hypertrophic cardiomyopathy.   2.  Normal right ventricular size function.    3.  Mildly enlarged both atria.  4.  Moderate mitral valve regurgitation.  5.  Mildly dilated mid ascending aorta 40 mm.     Cardiac cath: from 5/25/21 at [facility] demonstrated   1.  CORONARY ANGIOGRAPHY   FINAL DIAGNOSIS   1.  Mild coronary artery atherosclerosis   PRE-PROCEDURE DIAGNOSIS   1.  Cardiomyopathy Hypertrophic (HCC)   CORONARY DIAGNOSTIC SUMMARY   Coronary artery dominance is right. Normal right coronary.   The left main coronary artery is 30% obstructed by a discrete lesion and 20% obstructed by a discrete lesion.   The proximal left anterior descending artery is 20% obstructed by a discrete lesion.   The middle left anterior descending artery is 20% obstructed by a tubular lesion.   The distal left anterior descending artery is 20% obstructed by a discrete lesion.   Noted systolic compression of septal  arteries, likely consistent with patient's known hypertrophic cardiomyopathy.      CT pulmonary vein 9/2/22  Complete surgical excision of the left atrial appendage.  No evidence of intracardiac thrombus.  Status post surgical myectomy of the left ventricular septum.  No significant coronary artery disease.         Medications  Allergies   Current Outpatient Medications   Medication Sig Dispense Refill     aspirin (ASA) 81 MG EC tablet Take 1 tablet (81 mg) by mouth daily 90 tablet 3     Biotin 10 MG CAPS Take 1 capsule by mouth daily       calcium-vitamin D-vitamin K (VIACTIV) 500-500-40 MG-UNT-MCG CHEW Take 2 tablets by mouth daily.       Cetaphil Moisturizing (CETAPHIL) external lotion Apply 1 Application topically daily       cycloSPORINE (RESTASIS) 0.05 % ophthalmic emulsion Place 1 drop into both eyes 2 times daily       furosemide (LASIX) 20 MG tablet Take 2 tablets (40 mg) by  mouth daily as needed (fluid retention) (Patient taking differently: Take 20 mg by mouth daily as needed (fluid retention).) 20 tablet 11     ketoconazole (NIZORAL) 2 % external cream Apply topically as needed.       metoprolol succinate ER (TOPROL XL) 50 MG 24 hr tablet Take 1 tablet (50 mg) by mouth 2 times daily. 180 tablet 3     multivitamin  with lutein (OCUVITE WITH LUTEIN) CAPS per capsule Take 1 capsule by mouth daily       multivitamin (CENTRUM SILVER) tablet Take 2 tablets by mouth daily       Omega-3 Fatty Acids (RA FISH OIL) 1000 MG CAPS 2 capsules in the morning and 1 capsule at night       psyllium (METAMUCIL/KONSYL) Packet Take 1 packet by mouth daily       sodium chloride (OCEAN) 0.65 % nasal spray Spray 1 spray into both nostrils daily as needed for congestion       SUMAtriptan (IMITREX) 50 MG tablet Take 1 tablet (50 mg) by mouth as needed for migraine. 10 tablet 2     triamcinolone (KENALOG) 0.1 % external cream Apply topically as needed.       Turmeric Curcumin 500 MG CAPS Take 1 tablet by mouth daily       benzonatate (TESSALON) 200 MG capsule Take 1 capsule (200 mg) by mouth 3 times daily as needed for cough. 30 capsule 0      Allergies   Allergen Reactions     Shellfish Allergy Shortness Of Breath     Midazolam Hives     Welts     Acetaminophen      Chlorpheniramine Hives     Dextromethorphan Hives     Doxylamine Hives     Dust Mites Other (See Comments)     Stuffy nose     Lactose      Molds & Smuts      Other reaction(s): Wheezing  [place] blue grass,dust     Pseudoephedrine Hives     Trelegy Ellipta [Fluticasone-Umeclidin-Vilant] Swelling     Throat swelling     Adhesive Tape Rash     Latex Rash        Physical Examination Review of Systems   Vitals: /74   Pulse 57   Resp 14   Wt 87.1 kg (192 lb)   LMP  (LMP Unknown)   BMI 34.70 kg/m    BMI= Body mass index is 34.7 kg/m .  Wt Readings from Last 3 Encounters:   10/10/24 87.1 kg (192 lb)   09/24/24 87.8 kg (193 lb 8 oz)   09/14/24  89 kg (196 lb 3.2 oz)       General: pleasant female. No acute distress.   Neck: No JVD  Lungs: clear to auscultation  COR: regular rate and rhythm, 2/6 systolic flow murmur.  Extrem: No edema        Please refer above for cardiac ROS details.       Past History   Past Medical History:   Past Medical History:   Diagnosis Date     Hypertension      Hypertrophic cardiomyopathy (H) 01/15/2021    Severe outflow tract obstruction Preserved LV systolic performance Basal septum: 20 mm Gadolinium enhancement positive: Basal septum No major positive and no other minor positive risk factors identified.     Obese      Paroxysmal atrial fibrillation (H) 01/15/2021    Dx Nov 2020 (NAIF) [id number] score = 3 Rx apixaban     Parsonage-Mcknight syndrome         Past Surgical History:   Past Surgical History:   Procedure Laterality Date     COLONOSCOPY N/A 1/26/2023    Procedure: COLONOSCOPY with polypectomy;  Surgeon: Kwadwo Kline MD;  Location: [facility] Main OR     EP LOOP RECORDER IMPLANT N/A 1/29/2021    Procedure: EP Loop Recorder Insertion;  Surgeon: Paul Garza MD;  Location: [facility] Cardiac Cath Lab;  Service: Cardiology     EXCISE BREAST CYST/FIBROADENOMA/TUMOR/DUCT LESION/NIPPLE LESION/AREOLAR LESION      Description: Breast Surgery Lumpectomy;  Recorded: 03/07/2012;  Comments: adenoma     EXPLORE NECK N/A 1/22/2024    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  **Latex Allergy**;  Surgeon: Dawson Orr MD;  Location: [facility] OR      EXCISION NASAL POLYP(S), EXTENSIVE      Description: Extensive Excision Of Nasal Polyps;  Recorded: 03/07/2012;      KNEE SCOPE, DIAGNOSTIC      Description: Arthroscopy Knee Left;  Recorded: 03/07/2012;     IR LUMBAR PUNCTURE  8/22/2012     NH EXCIS TENDON SHEATH LESION, HAND/FINGER      Description: Hand Excision Of A Tendon Cyst;  Recorded: 03/07/2012;     US GUIDED NEEDLE PLACEMENT  7/20/2020     ZZC REMOVAL OF OVARY(S)      Description: Oophorectomy;   Recorded: 06/26/2013;        Family History:   Family History   Problem Relation Age of Onset     Pacemaker Father      Coronary Artery Disease Father 65.00     Colon Cancer Father         XRT and chemo     Heart Failure Father 79.00     Cerebrovascular Disease Mother 89.00     Hypertension Mother      Endometrial Cancer Mother      Other - See Comments Mother         Scarlet fever vs rheumatic fever     Other - See Comments Cousin 16.00        Drown, Hypertrophic (apparently via father - not realted)     Cardiomyopathy Cousin         Hypertrophic (apparently via father - not realted)     Cardiomyopathy Child         Hypertrophic (apparently via father - not realted)     Sudden Death No family hx of         Social History:   Social History     Socioeconomic History     Marital status:      Spouse name: Not on file     Number of children: Not on file     Years of education: Not on file     Highest education level: Not on file   Occupational History     Not on file   Tobacco Use     Smoking status: Never     Passive exposure: Current     Smokeless tobacco: Never   Vaping Use     Vaping status: Never Used   Substance and Sexual Activity     Alcohol use: Not Currently     Comment: rarely     Drug use: Never     Sexual activity: Not Currently   Other Topics Concern     Not on file   Social History Narrative     Not on file     Social Determinants of Health     Financial Resource Strain: Low Risk  (9/24/2024)    Financial Resource Strain      Within the past 12 months, have you or your family members you live with been unable to get utilities (heat, electricity) when it was really needed?: No   Food Insecurity: Low Risk  (9/24/2024)    Food Insecurity      Within the past 12 months, did you worry that your food would run out before you got money to buy more?: No      Within the past 12 months, did the food you bought just not last and you didn t have money to get more?: No   Transportation Needs: Low Risk   (9/24/2024)    Transportation Needs      Within the past 12 months, has lack of transportation kept you from medical appointments, getting your medicines, non-medical meetings or appointments, work, or from getting things that you need?: No   Physical Activity: Sufficiently Active (9/24/2024)    Exercise Vital Sign      Days of Exercise per Week: 7 days      Minutes of Exercise per Session: 120 min   Stress: No Stress Concern Present (9/24/2024)    Citizen of [place] of Occupational Health - Occupational Stress Questionnaire      Feeling of Stress : Not at all   Social Connections: Unknown (9/24/2024)    Social Connection and Isolation Panel [NHANES]      Frequency of Communication with Friends and Family: Not on file      Frequency of Social Gatherings with Friends and Family: Three times a week      Attends Quaker Services: Not on file      Active Member of Clubs or Organizations: Not on file      Attends Club or Organization Meetings: Not on file      Marital Status: Not on file   Interpersonal Safety: Low Risk  (9/24/2024)    Interpersonal Safety      Do you feel physically and emotionally safe where you currently live?: Yes      Within the past 12 months, have you been hit, slapped, kicked or otherwise physically hurt by someone?: No      Within the past 12 months, have you been humiliated or emotionally abused in other ways by your partner or ex-partner?: No   Housing Stability: Low Risk  (9/24/2024)    Housing Stability      Do you have housing? : Yes      Are you worried about losing your housing?: No            Lab Results    Chemistry/lipid CBC Cardiac Enzymes/BNP/TSH/INR   Lab Results   Component Value Date    CHOL 219 (H) 09/24/2024    HDL 65 09/24/2024    TRIG 112 09/24/2024    BUN 14.0 09/24/2024     09/24/2024    CO2 32 (H) 09/24/2024    Lab Results   Component Value Date    WBC 9.6 09/24/2024    HGB 15.8 (H) 09/24/2024    HCT 48.2 (H) 09/24/2024    MCV 87 09/24/2024     09/24/2024     Lab Results   Component Value Date    TROPONINI 0.03 07/23/2021     (H) 07/23/2021    TSH 2.48 05/27/2020                Thank you for allowing me to participate in the care of your patient.      Sincerely,     Hossein Arnold MD     [facility]  cc:   Hossein Arnold MD  [address], [address]  [place] [phone]

## 2024-10-14 ENCOUNTER — TELEPHONE (OUTPATIENT)
Dept: CARDIOLOGY | Facility: CLINIC | Age: 74
End: 2024-10-14
Payer: COMMERCIAL

## 2024-10-14 NOTE — TELEPHONE ENCOUNTER
Pt called in, she wishes to have her ILR removed.  She notes and event 2 years or longer where it occurred in the middle of the night and believes it was shown on ILR. no other episodes of any significance since then. She also reports her spouse will be undergoing AVR in the coming weeks and will contact the clinic when ready to proceed with ILR removal.-diego

## 2024-10-25 ENCOUNTER — TELEPHONE (OUTPATIENT)
Dept: CARDIOLOGY | Facility: CLINIC | Age: 74
End: 2024-10-25
Payer: COMMERCIAL

## 2024-10-28 ENCOUNTER — PREP FOR PROCEDURE (OUTPATIENT)
Dept: CARDIOLOGY | Facility: CLINIC | Age: 74
End: 2024-10-28
Payer: COMMERCIAL

## 2024-10-28 ENCOUNTER — DOCUMENTATION ONLY (OUTPATIENT)
Dept: CARDIOLOGY | Facility: CLINIC | Age: 74
End: 2024-10-28
Payer: COMMERCIAL

## 2024-10-28 ENCOUNTER — ANCILLARY ORDERS (OUTPATIENT)
Dept: CARDIOLOGY | Facility: CLINIC | Age: 74
End: 2024-10-28

## 2024-10-28 DIAGNOSIS — I48.0 PAF (PAROXYSMAL ATRIAL FIBRILLATION) (H): Primary | ICD-10-CM

## 2024-10-28 DIAGNOSIS — Z45.09 ENCOUNTER FOR LOOP RECORDER AT END OF BATTERY LIFE: Primary | ICD-10-CM

## 2024-10-28 RX ORDER — LIDOCAINE 40 MG/G
CREAM TOPICAL
OUTPATIENT
Start: 2024-10-28

## 2024-10-28 NOTE — TELEPHONE ENCOUNTER
Shelby Puente MD Holen, Megan, RN  Caller: Unspecified (3 days ago,  4:15 PM)  Okay to schedule with any EP MD

## 2024-10-28 NOTE — PROGRESS NOTES
AC: None- NA Diuretics:  lasix  DM Meds: None  GLP-1:None     oRmy Izquierdo 1950 [id number]  Home:[phone] (home) Cell:[phone] (mobile)  Emergency Contact: Flaca El [phone]  PCP: Diana Coello, [phone]      Important patient information for CSC/Cath Lab staff : None    [facility] EP Cath Lab Procedure Order     Device Implant/Revision:  Procedure:  ILR removal  Current Device/Device Co Needed for Procedure: Medtronic Single  loop    Ordering Provider: Dr Puente (Generator Changes can be scheduled with any provider)  Date Ordered and Prepped: 10/28/2024 Ely Barbosa RN  Diagnosis:  Device at SAM  Scheduling Timeframe:  Next Available  Scheduling Restrictions: None  Scheduling Contact: Please contact pt to schedule, if you are unable to schedule date within the next 24 hours please contact pt to update on scheduling process  Cardiology Follow Up Apt s/p:  Standard Device follow up General Card @ 6mo (does not include New CRT/CSP/HIS)  Pre-Procedural Testing needed: None  Anesthesia:  None    [facility] EP Cath Lab Prep   H&P:  Previous appt with Dr. Arnold completed 10/10 ok to use if within 30 days otherwise Pt to schedule with PMD to complete  Pre-Procedure Labs/T&S: For SICD & MICRA Devices only schedule lab visit at [facility] lab within 3 days prior to procedure for T&S, BMP, CBC, HcG is appropriate, and INR if on warfarin. All other Devices pre-procedure lab work will be done the morning of the procedure.  Medical Records Pertinent for Procedure: None  Iodinated Contrast Dye Allergies (Does not include Shellfish, Egg, and/or Iodine Allergy)- excludes ILR/SICD:None  Renal Protocol (GFR < 40ml/min, IV contrast past 2 days, EF < or = 25%)- excludes ILR/SICD: No  GLP-1 Protocol: If on Dulaglutide (Trulicity) (weekly)- Injection hold 7 days prior to procedure  , Exenatide extended release (Bydureon bcise) (weekly)- Injection hold 7 days prior to procedure, Exenatide (Byetta) (twice daily)- Oral  Tablet hold day prior and morning of procedure and for Injection hold 7 days prior to procedure, Semaglutide (Ozempic) (weekly)- Injection and Oral hold 7 days prior to procedure, Liraglutide (Victoza, Saxenda) (daily)- Injection hold day prior and morning of procedure    Allergies   Allergen Reactions    Shellfish Allergy Shortness Of Breath    Midazolam Hives     Welts    Acetaminophen     Chlorpheniramine Hives    Dextromethorphan Hives    Doxylamine Hives    Dust Mites Other (See Comments)     Stuffy nose    Lactose     Molds & Smuts      Other reaction(s): Wheezing  [place] blue grass,dust    Pseudoephedrine Hives    Trelegy Ellipta [Fluticasone-Umeclidin-Vilant] Swelling     Throat swelling    Adhesive Tape Rash    Latex Rash       Current Outpatient Medications:     aspirin (ASA) 81 MG EC tablet, Take 1 tablet (81 mg) by mouth daily, Disp: 90 tablet, Rfl: 3    benzonatate (TESSALON) 200 MG capsule, Take 1 capsule (200 mg) by mouth 3 times daily as needed for cough., Disp: 30 capsule, Rfl: 0    Biotin 10 MG CAPS, Take 1 capsule by mouth daily, Disp: , Rfl:     calcium-vitamin D-vitamin K (VIACTIV) 500-500-40 MG-UNT-MCG CHEW, Take 2 tablets by mouth daily., Disp: , Rfl:     Cetaphil Moisturizing (CETAPHIL) external lotion, Apply 1 Application topically daily, Disp: , Rfl:     cycloSPORINE (RESTASIS) 0.05 % ophthalmic emulsion, Place 1 drop into both eyes 2 times daily, Disp: , Rfl:     furosemide (LASIX) 20 MG tablet, Take 2 tablets (40 mg) by mouth daily as needed (fluid retention) (Patient taking differently: Take 20 mg by mouth daily as needed (fluid retention).), Disp: 20 tablet, Rfl: 11    ketoconazole (NIZORAL) 2 % external cream, Apply topically as needed., Disp: , Rfl:     metoprolol succinate ER (TOPROL XL) 50 MG 24 hr tablet, Take 1 tablet (50 mg) by mouth 2 times daily., Disp: 180 tablet, Rfl: 3    multivitamin  with lutein (OCUVITE WITH LUTEIN) CAPS per capsule, Take 1 capsule by mouth daily, Disp:  , Rfl:     multivitamin (CENTRUM SILVER) tablet, Take 2 tablets by mouth daily, Disp: , Rfl:     Omega-3 Fatty Acids (RA FISH OIL) 1000 MG CAPS, 2 capsules in the morning and 1 capsule at night, Disp: , Rfl:     psyllium (METAMUCIL/KONSYL) Packet, Take 1 packet by mouth daily, Disp: , Rfl:     sodium chloride (OCEAN) 0.65 % nasal spray, Spray 1 spray into both nostrils daily as needed for congestion, Disp: , Rfl:     SUMAtriptan (IMITREX) 50 MG tablet, Take 1 tablet (50 mg) by mouth as needed for migraine., Disp: 10 tablet, Rfl: 2    triamcinolone (KENALOG) 0.1 % external cream, Apply topically as needed., Disp: , Rfl:     Turmeric Curcumin 500 MG CAPS, Take 1 tablet by mouth daily, Disp: , Rfl:     Documentation Date:10/28/2024 10:05 AM  Ely Barbosa RN

## 2024-11-19 ENCOUNTER — TELEPHONE (OUTPATIENT)
Dept: CARDIOLOGY | Facility: CLINIC | Age: 74
End: 2024-11-19
Payer: COMMERCIAL

## 2024-11-19 NOTE — TELEPHONE ENCOUNTER
Pre-Procedure Education    Procedure: ILR Removal with Dr Puente on 11/26/24 with arrival time 6:00 am    Orders: Orderset for procedure verified signed/held    COVID: COVID policy- if pt develops COVID like symptoms prior to procedure, he/she would need to complete an at home with a rapid antigen COVID test 1-2 days prior to your procedure date. If COVID + pt is aware the procedure will need to be rescheduled, and to contact CV scheduling as soon as possible    Pre-Op H&P:  NOT NEEDED -  n/a    Education:   Contact: Reviewed via phone with pt  Pre-Procedure Instruction: NPO after midnight pre procedure, Defined NPO with pt, Remove all jewelry and leave all valuables at home, Shower prior to arrival, Sedation plan/orders, Transportation requirements and arrangements post procedure, Post-procedure follow up process, Post-procedure restrictions/expectations, and Pre-procedure letter sent- letter tab  Risks:  Implantable Loop Recorder Insertion  <1% for Infection  < 1% Bleeding, Hematoma (increased with anticoagulation therapy)   <1% generator malfunction    Medication:   CONTINUE ALL MEDICATION AS PRESCRIBED.    Important patient information for staff: None    11/19/2024 8:36 AM  Roxana Almaraz RN

## 2024-11-26 ENCOUNTER — HOSPITAL ENCOUNTER (OUTPATIENT)
Facility: HOSPITAL | Age: 74
Discharge: HOME | End: 2024-11-26
Attending: INTERNAL MEDICINE | Admitting: INTERNAL MEDICINE
Payer: COMMERCIAL

## 2024-11-26 VITALS
OXYGEN SATURATION: 90 % | HEART RATE: 56 BPM | WEIGHT: 189 LBS | TEMPERATURE: 98 F | HEIGHT: 63 IN | RESPIRATION RATE: 16 BRPM | BODY MASS INDEX: 33.49 KG/M2 | SYSTOLIC BLOOD PRESSURE: 162 MMHG | DIASTOLIC BLOOD PRESSURE: 78 MMHG

## 2024-11-26 DIAGNOSIS — I50.32 CHRONIC DIASTOLIC CONGESTIVE HEART FAILURE (H): ICD-10-CM

## 2024-11-26 DIAGNOSIS — I48.0 PAF (PAROXYSMAL ATRIAL FIBRILLATION) (H): ICD-10-CM

## 2024-11-26 PROCEDURE — 33286 RMVL SUBQ CAR RHYTHM MNTR: CPT | Performed by: INTERNAL MEDICINE

## 2024-11-26 PROCEDURE — 250N000009 HC RX 250: Performed by: INTERNAL MEDICINE

## 2024-11-26 RX ORDER — FUROSEMIDE 20 MG/1
20 TABLET ORAL DAILY PRN
Qty: 60 TABLET | Refills: 3 | Status: SHIPPED | OUTPATIENT
Start: 2024-11-26

## 2024-11-26 RX ORDER — LIDOCAINE 40 MG/G
CREAM TOPICAL
Status: DISCONTINUED | OUTPATIENT
Start: 2024-11-26 | End: 2024-11-26 | Stop reason: HOSPADM

## 2024-11-26 ASSESSMENT — ACTIVITIES OF DAILY LIVING (ADL)
ADLS_ACUITY_SCORE: 56

## 2024-11-26 NOTE — DISCHARGE INSTRUCTIONS
[facility]  Cardiac Electrophysiology  [address]  [place] [address]   Office: [phone]  Fax: [phone]     Cardiac Electrophysiology - Loop Recorder Implantation Discharge Instructions      PROCEDURE   ILR (implantable loop recorder) placement         MEDICATION INSTRUCTIONS   Continue taking home meds          WOUND CARE   Leave the large overlying dressing in place until 2 days after discharge - this dressing can thereafter be removed by gently pulling off.  Underneath the large dressing will be steri-strips. DO NOT REMOVE these strips; please leave these in place until you are seen in clinic.  DO NOT COVER the site with any bandages or dressings. The site should be kept dry for 3-5 days - please avoid traditional showers or baths during this time.  Keep the site clean and dry.  No swimming, sauna, or hot tub use until incision is completely healed.         ACTIVITY   It takes approximately 1-2 weeks for your incision to heal.  During this time use extra precautions - please avoid the following:  Raising your arm over your head or stretching behind your back (no hyperflexion)  Pushing or pulling (mowing the lawn, vacuuming)  Lifting anything heavier than 10 pounds or a gallon of milk  Sudden or extreme motions  Be physically active every day.  Moving your arm is important         REMOTE MONITORING   You will receive a remote transmission station to monitor your device from home  Please set the transmitter up as soon as you get home from the hospital.  Directions are included in the box, and you may call our office or the company technical support line if you need assistance connecting your transmitter.  Please send a transmission the day after you go home  Automated transmissions will be sent every 3 months or sooner if device issues are detected.  You may manually send in transmissions if you are having arrhythmia symptoms or think there may be a problem with  your device.  Please call our office at [phone] if you send in a transmission off-schedule         WHAT TO WATCH OUT FOR   Contact our office or seek emergency care for any of the following:  Drainage, bleeding or oozing from the site  Redness, increased swelling, or warmth around the device site  Pain not treated with prescribed pain medication  Temperature greater than 100.4F  Chest pain, shortness of breath, dizziness/fainting         FOLLOW-UP   Our office will coordinate a follow-up visit visit in clinic for a device interrogation and an incision check 7-14 days after your procedure.   Please contact us at [phone] any issues during your recovery.

## 2025-01-10 ENCOUNTER — TRANSFERRED RECORDS (OUTPATIENT)
Dept: HEALTH INFORMATION MANAGEMENT | Facility: CLINIC | Age: 75
End: 2025-01-10
Payer: COMMERCIAL

## 2025-03-09 ENCOUNTER — ANCILLARY PROCEDURE (OUTPATIENT)
Dept: GENERAL RADIOLOGY | Facility: CLINIC | Age: 75
End: 2025-03-09
Attending: INTERNAL MEDICINE
Payer: COMMERCIAL

## 2025-03-09 ENCOUNTER — OFFICE VISIT (OUTPATIENT)
Dept: URGENT CARE | Facility: URGENT CARE | Age: 75
End: 2025-03-09
Payer: COMMERCIAL

## 2025-03-09 VITALS
SYSTOLIC BLOOD PRESSURE: 132 MMHG | TEMPERATURE: 98.7 F | RESPIRATION RATE: 20 BRPM | HEART RATE: 65 BPM | DIASTOLIC BLOOD PRESSURE: 88 MMHG | OXYGEN SATURATION: 93 %

## 2025-03-09 DIAGNOSIS — R06.02 SOB (SHORTNESS OF BREATH): ICD-10-CM

## 2025-03-09 DIAGNOSIS — R06.02 SOB (SHORTNESS OF BREATH): Primary | ICD-10-CM

## 2025-03-09 LAB
ANION GAP SERPL CALCULATED.3IONS-SCNC: 6 MMOL/L (ref 3–14)
BASOPHILS # BLD AUTO: 0.1 10E3/UL (ref 0–0.2)
BASOPHILS NFR BLD AUTO: 1 %
BUN SERPL-MCNC: 17 MG/DL (ref 7–30)
CALCIUM SERPL-MCNC: 9.6 MG/DL (ref 8.5–10.1)
CHLORIDE BLD-SCNC: 111 MMOL/L (ref 94–109)
CO2 SERPL-SCNC: 31 MMOL/L (ref 20–32)
CREAT SERPL-MCNC: 0.9 MG/DL (ref 0.52–1.04)
EGFRCR SERPLBLD CKD-EPI 2021: 68 ML/MIN/1.73M2
EOSINOPHIL # BLD AUTO: 0.2 10E3/UL (ref 0–0.7)
EOSINOPHIL NFR BLD AUTO: 3 %
ERYTHROCYTE [DISTWIDTH] IN BLOOD BY AUTOMATED COUNT: 13.5 % (ref 10–15)
GLUCOSE BLD-MCNC: 107 MG/DL (ref 70–99)
HCT VFR BLD AUTO: 46.2 % (ref 35–47)
HGB BLD-MCNC: 15 G/DL (ref 11.7–15.7)
IMM GRANULOCYTES # BLD: 0 10E3/UL
IMM GRANULOCYTES NFR BLD: 0 %
LYMPHOCYTES # BLD AUTO: 3.5 10E3/UL (ref 0.8–5.3)
LYMPHOCYTES NFR BLD AUTO: 38 %
MCH RBC QN AUTO: 28.7 PG (ref 26.5–33)
MCHC RBC AUTO-ENTMCNC: 32.5 G/DL (ref 31.5–36.5)
MCV RBC AUTO: 89 FL (ref 78–100)
MONOCYTES # BLD AUTO: 0.6 10E3/UL (ref 0–1.3)
MONOCYTES NFR BLD AUTO: 7 %
NEUTROPHILS # BLD AUTO: 4.7 10E3/UL (ref 1.6–8.3)
NEUTROPHILS NFR BLD AUTO: 51 %
NT-PROBNP SERPL-MCNC: 595 PG/ML (ref 0–900)
PLATELET # BLD AUTO: 160 10E3/UL (ref 150–450)
POTASSIUM BLD-SCNC: 4.1 MMOL/L (ref 3.4–5.3)
RBC # BLD AUTO: 5.22 10E6/UL (ref 3.8–5.2)
SODIUM SERPL-SCNC: 148 MMOL/L (ref 135–145)
WBC # BLD AUTO: 9.1 10E3/UL (ref 4–11)

## 2025-03-09 PROCEDURE — 80048 BASIC METABOLIC PNL TOTAL CA: CPT | Performed by: INTERNAL MEDICINE

## 2025-03-09 PROCEDURE — 36415 COLL VENOUS BLD VENIPUNCTURE: CPT | Performed by: INTERNAL MEDICINE

## 2025-03-09 PROCEDURE — 85025 COMPLETE CBC W/AUTO DIFF WBC: CPT | Performed by: INTERNAL MEDICINE

## 2025-03-09 PROCEDURE — 3079F DIAST BP 80-89 MM HG: CPT | Performed by: INTERNAL MEDICINE

## 2025-03-09 PROCEDURE — 99204 OFFICE O/P NEW MOD 45 MIN: CPT | Performed by: INTERNAL MEDICINE

## 2025-03-09 PROCEDURE — 3075F SYST BP GE 130 - 139MM HG: CPT | Performed by: INTERNAL MEDICINE

## 2025-03-09 PROCEDURE — 83880 ASSAY OF NATRIURETIC PEPTIDE: CPT | Performed by: INTERNAL MEDICINE

## 2025-03-09 PROCEDURE — 93000 ELECTROCARDIOGRAM COMPLETE: CPT | Performed by: INTERNAL MEDICINE

## 2025-03-09 PROCEDURE — 71046 X-RAY EXAM CHEST 2 VIEWS: CPT | Mod: TC | Performed by: RADIOLOGY

## 2025-04-21 NOTE — TELEPHONE ENCOUNTER
No protocol for requested medication.    Medication: HYDROcodone-acetaminophen (NORCO) 5-325 MG per tablet   Last office visit date: 4/10/25  Pharmacy: [id number] - [place], [place] - [address], [address]    Order pended, routed to clinician for review.     4/10/2025 Last office visit  Wt Readings from Last 1 Encounters:   04/10/25 92.1 kg (203 lb)        BP Readings from Last 2 Encounters:   04/10/25 120/68   02/19/25 130/78   ]    Lab Results   Component Value Date    SODIUM 139 11/14/2024    POTASSIUM 4.1 11/14/2024    CHLORIDE 107 11/14/2024    CO2 25 11/14/2024    BUN 16 11/14/2024    CREATININE 0.83 11/14/2024    GLUCOSE 108 (H) 11/14/2024     No results found for: \"HGBA1C\"  TSH (mIU/L)   Date Value   03/13/2024 1.10     Lab Results   Component Value Date    CHOLESTEROL 163 03/13/2024    HDL 71 03/13/2024    CALCLDL 78 03/13/2024    TRIGLYCERIDE 65 03/13/2024     Lab Results   Component Value Date    AST 15 11/14/2024    GPT 29 11/14/2024    ALKPT 76 11/14/2024    BILIRUBIN 0.4 11/14/2024        Wellness Screening Tool  Symptom Screening:  Do you have one of the following NEW symptoms:    Fever (subjective or >100.0)?  No    A new cough?  No    Shortness of breath?  No     Chills? No     New loss of taste or smell? No     Generalized body aches? No     New persistent headache? No     New sore throat? No     Nausea, vomiting, or diarrhea?  No    Within the past 2 weeks, have you been exposed to someone with a known positive illness below:    COVID-19 (known or suspected)?  No    Chicken pox?  No    Mealses?  No    Pertussis?  No    Patient notified of visitor policy- No visitors are allowed to accompany the patient at this time. Yes  Pt informed to wear a mask: Yes  Pt notified if they develop any symptoms listed above, prior to their appointment, they are to call the clinic directly at [phone] for further instructions.  Yes  Patient's appointment status: Patient will be seen in clinic as scheduled on 12/14

## 2025-04-23 ENCOUNTER — OFFICE VISIT (OUTPATIENT)
Dept: FAMILY MEDICINE | Facility: CLINIC | Age: 75
End: 2025-04-23
Payer: COMMERCIAL

## 2025-04-23 VITALS
OXYGEN SATURATION: 97 % | TEMPERATURE: 97.6 F | BODY MASS INDEX: 36 KG/M2 | HEART RATE: 60 BPM | DIASTOLIC BLOOD PRESSURE: 80 MMHG | WEIGHT: 203.2 LBS | SYSTOLIC BLOOD PRESSURE: 110 MMHG | RESPIRATION RATE: 16 BRPM

## 2025-04-23 DIAGNOSIS — M79.675 PAIN OF TOE OF LEFT FOOT: ICD-10-CM

## 2025-04-23 DIAGNOSIS — S90.425A TOE BLISTER WITHOUT INFECTION, LEFT, INITIAL ENCOUNTER: Primary | ICD-10-CM

## 2025-04-23 DIAGNOSIS — S90.122A CONTUSION OF LEFT LESSER TOE(S) WITHOUT DAMAGE TO NAIL, INITIAL ENCOUNTER: ICD-10-CM

## 2025-04-23 PROCEDURE — 10140 I&D HMTMA SEROMA/FLUID COLLJ: CPT | Mod: GZ | Performed by: STUDENT IN AN ORGANIZED HEALTH CARE EDUCATION/TRAINING PROGRAM

## 2025-04-23 PROCEDURE — 3074F SYST BP LT 130 MM HG: CPT | Performed by: STUDENT IN AN ORGANIZED HEALTH CARE EDUCATION/TRAINING PROGRAM

## 2025-04-23 PROCEDURE — 99207 PR DROP WITH A PROCEDURE: CPT | Performed by: STUDENT IN AN ORGANIZED HEALTH CARE EDUCATION/TRAINING PROGRAM

## 2025-04-23 PROCEDURE — 3079F DIAST BP 80-89 MM HG: CPT | Performed by: STUDENT IN AN ORGANIZED HEALTH CARE EDUCATION/TRAINING PROGRAM

## 2025-04-23 NOTE — PROGRESS NOTES
Assessment & Plan   Problem List Items Addressed This Visit    None  Visit Diagnoses       Toe blister without infection, left, initial encounter    -  Primary    Relevant Orders    Hematoma/Fluid Drainage [91117] (Completed)    Contusion of left lesser toe(s) without damage to nail, initial encounter        Relevant Orders    Hematoma/Fluid Drainage [72538] (Completed)    Pain of toe of left foot                 74-year-old with a history of heart disease and A-fib not on AC presents with worsening painful toe blister.  Last night she was wearing sandals, went to put bird feet out and felt like she got bit on her toe, was not painful but was very itchy.  Area was excoriated but nothing was going on with the joints or the skin, no systemic symptoms.  This morning she applied alcohol and bacitracin to the area and it burned, and then in the hours after the area started to swell and worsening blood blister formed.  Exam here reassuring against deeper dermal/bone infection, pulses intact and no bony tenderness. VSS.  With her informed consent blister was cleansed with chlorhexidine and incised with the scalpel blade with return of copious serosanguineous fluid without complication.  Area did not fully flatten, see post procedure image below.  Lower concern for brown recluse or other concerning bite, especially given the change following topical meds.  Out of precaution we will have her follow-up for an in person visit for recheck tomorrow morning.  ED precautions discussed.      Lourdes Stanton is a 74 year old, presenting for the following health issues:  Insect Bites (Pt was in bed a few days ago and she went to pull off the blanket and it took some skin off her toe.Pt was outside yesterday and got bit by a bug. She used bacitracin and she wrapped it up and that made it worse. The bite is on her left fourth toe closest to her pinky toe. The bite didn't start to swell until this morning. There is a blood  blister that is getting bigger and it is causing her pain.)        4/23/2025    11:18 AM   Additional Questions   Roomed by Aileen     History of Present Illness       Reason for visit:  Bug bite witha blood blister and redness   She is taking medications regularly.        Last night went out to put out bird feed  Sudden itching on left toe - 4th digit  Then just this AM raw area blew up, bubbled into blood blister  Put etoh on first, then bacitracin   Has used bacitracin before  No pain in foot or ankle  Feels well otherwise  Has cardiomyopathy  Not on blood thinner just ASA 81 mg        Objective    /80   Pulse 60   Temp 97.6  F (36.4  C) (Tympanic)   Resp 16   Wt 92.2 kg (203 lb 3.2 oz)   LMP  (LMP Unknown)   SpO2 97%   BMI 36.00 kg/m    Body mass index is 36 kg/m .  Physical Exam  Constitutional:       General: She is not in acute distress.     Appearance: Normal appearance. She is not ill-appearing.   HENT:      Nose: Nose normal.      Mouth/Throat:      Mouth: Mucous membranes are moist.   Eyes:      Conjunctiva/sclera: Conjunctivae normal.   Cardiovascular:      Rate and Rhythm: Normal rate.      Pulses:           Dorsalis pedis pulses are 2+ on the left side.        Posterior tibial pulses are 2+ on the left side.   Pulmonary:      Effort: Pulmonary effort is normal.   Feet:      Left foot:      Skin integrity: Blister (Fourth toe, with associated tenderness just adjacent to blister) and erythema (Very slight, localized around blister.  No extension of erythema past toe.) present. No warmth.      Toenail Condition: Left toenails are normal.      Comments: No bony tenderness in foot or ankle  Skin:     General: Skin is warm.   Neurological:      General: No focal deficit present.      Mental Status: She is alert.   Psychiatric:         Mood and Affect: Mood normal.         Behavior: Behavior normal.           Toe appearance after incising              Signed Electronically by: Yaritza CORDERO  MD Priyanka

## 2025-04-24 ENCOUNTER — OFFICE VISIT (OUTPATIENT)
Dept: FAMILY MEDICINE | Facility: CLINIC | Age: 75
End: 2025-04-24
Payer: COMMERCIAL

## 2025-04-24 VITALS
DIASTOLIC BLOOD PRESSURE: 58 MMHG | BODY MASS INDEX: 36.02 KG/M2 | TEMPERATURE: 97.7 F | SYSTOLIC BLOOD PRESSURE: 100 MMHG | HEART RATE: 58 BPM | RESPIRATION RATE: 16 BRPM | HEIGHT: 63 IN | WEIGHT: 203.3 LBS | OXYGEN SATURATION: 94 %
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
## 2025-04-24 DIAGNOSIS — S90.465D: Primary | ICD-10-CM

## 2025-04-24 RX ORDER — CEPHALEXIN 500 MG/1
500 CAPSULE ORAL 2 TIMES DAILY
Qty: 14 CAPSULE | Refills: 0 | Status: SHIPPED | OUTPATIENT
Start: 2025-04-24 | End: 2025-05-01

## 2025-04-24 RX ORDER — TRIAMCINOLONE ACETONIDE 1 MG/G
OINTMENT TOPICAL 2 TIMES DAILY
Qty: 30 G | Refills: 0 | Status: SHIPPED | OUTPATIENT
Start: 2025-04-24

## 2025-04-24 NOTE — PROGRESS NOTES
"  Assessment & Plan     Insect bite of lesser toe of left foot, subsequent encounter  Recheck after being seen yesterday, today has developed additional blister, possible erythema surrounding area, very mild.  Patient does have edema in left foot greater than right at baseline per her report.  Due to worsening appearance of what is likely an arthropod bite will treat with antibiotic and also topical steroid.  Patient is having some hyper moisturization of skin underneath toe and bottom of foot due to constant contact with moist bandage.  Advised to use bandage only when necessary and otherwise just keep a thin film of ointment on top of wound.  Discussed red flags including systemic infection or toxicity signs, red streak bleeding from wound, and if worsened at all should follow-up in clinic for recheck again tomorrow.  Patient advised if any worsening of symptoms and certainly any of the red flags discussed should be seen in urgent care/ED over the weekend.  - cephALEXin (KEFLEX) 500 MG capsule; Take 1 capsule (500 mg) by mouth 2 times daily for 7 days.  - triamcinolone (KENALOG) 0.1 % external ointment; Apply topically 2 times daily.        BMI  Estimated body mass index is 36.01 kg/m  as calculated from the following:    Height as of this encounter: 1.6 m (5' 3\").    Weight as of this encounter: 92.2 kg (203 lb 4.8 oz).             Lourdes Stanton is a 74 year old, presenting for the following health issues:  Follow Up (Seen yesterday for toe blister on left foot, fourth digit. Dressing was changed 3x due to excess drainage. A new blister has appeared on the same toe. )      4/24/2025    10:30 AM   Additional Questions   Roomed by annie olivier   Accompanied by self     History of Present Illness       Reason for visit:  Bug bite witha blood blister and redness  Symptom onset:  1-3 days ago  Symptom intensity:  Moderate  Symptom progression:  Worsening   She is taking medications regularly.                    " "  Objective    /58 (BP Location: Right arm, Patient Position: Sitting)   Pulse 58   Temp 97.7  F (36.5  C) (Tympanic)   Resp 16   Ht 1.6 m (5' 3\")   Wt 92.2 kg (203 lb 4.8 oz)   LMP  (LMP Unknown)   SpO2 94%   BMI 36.01 kg/m    Body mass index is 36.01 kg/m .  Physical Exam     Constitutional:       General: She is not in acute distress.     Appearance: Normal appearance. She is not ill-appearing.   HENT:      Nose: Nose normal.      Mouth/Throat:      Mouth: Mucous membranes are moist.   Eyes:      Conjunctiva/sclera: Conjunctivae normal.   Cardiovascular:      Rate and Rhythm: Normal rate.      Pulses:           Dorsalis pedis pulses are 2+ on the left side.        Posterior tibial pulses are 2+ on the left side.   Mild edema to the left foot, patient reports she often has edema in this foot at baseline.  Pulmonary:      Effort: Pulmonary effort is normal.   Feet:      Left foot:      Skin integrity: See picture above.  Fluid-filled blister intact at base of fourth toe, mild erythema proximal to foot next to blister, previously incised hematoma open with no signs of infection.     Toenail Condition: Left toenails are normal.      Comments: No bony tenderness in foot or ankle  Skin:     General: Skin is warm.   Neurological:      General: No focal deficit present.      Mental Status: She is alert.   Psychiatric:         Mood and Affect: Mood normal.         Behavior: Behavior normal.              Signed Electronically by: SJ Varghese CNP    "

## 2025-07-13 ENCOUNTER — HEALTH MAINTENANCE LETTER (OUTPATIENT)
Age: 75
End: 2025-07-13

## 2025-08-18 ENCOUNTER — TRANSFERRED RECORDS (OUTPATIENT)
Dept: HEALTH INFORMATION MANAGEMENT | Facility: CLINIC | Age: 75
End: 2025-08-18
Payer: COMMERCIAL

## (undated) DEVICE — SUCTION SLEEVE NEPTUNE 2 165MM 0703-005-165

## (undated) DEVICE — SU SILK 4-0 TIE 12X30" A303H

## (undated) DEVICE — PACK NEURO MINOR UMMC SNE32MNMU4

## (undated) DEVICE — GLOVE BIOGEL PI MICRO SZ 7.0 48570

## (undated) DEVICE — SU SILK 0 TIE 6X30" A306H

## (undated) DEVICE — SOL WATER IRRIG 1000ML BOTTLE 2F7114

## (undated) DEVICE — Device

## (undated) DEVICE — STRAP UNIVERSAL POSITIONING 2-PIECE 4X47X76" 91-287

## (undated) DEVICE — LINEN TOWEL PACK X5 5464

## (undated) DEVICE — ESU CORD BIPOLAR AND IRR TUBING AESCULAP US355

## (undated) DEVICE — GLOVE BIOGEL PI MICRO SZ 7.5 48575

## (undated) DEVICE — SOL NACL 0.9% IRRIG 1000ML BOTTLE 2F7124

## (undated) DEVICE — SUCTION MANIFOLD NEPTUNE 2 SYS 1 PORT 702-025-000

## (undated) DEVICE — SPONGE KITTNER 30-101

## (undated) DEVICE — SPONGE LAP 18X18" X8435

## (undated) DEVICE — LABEL MEDICATION SYSTEM 3303-P

## (undated) DEVICE — SU VICRYL 3-0 SH 8X18" UND J864D

## (undated) DEVICE — DRAIN JACKSON PRATT 07MM FLAT SU130-1310

## (undated) DEVICE — CLIP HORIZON SM RED WIDE SLOT 001201

## (undated) DEVICE — ESU GROUND PAD ADULT REM W/15' CORD E7507DB

## (undated) DEVICE — VESSEL LOOPS YELLOW MAXI 31145694

## (undated) DEVICE — SU ETHILON 4-0 PC-3 18" 1864G

## (undated) DEVICE — SU ETHILON 3-0 PS-1 18" 1663H

## (undated) DEVICE — DRSG TELFA 3X8" 1238

## (undated) DEVICE — DRAPE SHEET REV FOLD 3/4 9349

## (undated) DEVICE — FORCEP BIOPSY DISP 000386

## (undated) DEVICE — SUCTION MANIFOLD NEPTUNE 2 SYS 4 PORT 0702-020-000

## (undated) DEVICE — CLIP HORIZON MED BLUE 002200

## (undated) DEVICE — CATH FOLEY 14FR 5ML SILICONE LUBRI-SIL 175814

## (undated) DEVICE — SU SILK 2-0 SH CR 8X18" C012D

## (undated) DEVICE — SU SILK 2-0 TIE 12X30" A305H

## (undated) DEVICE — TUBING SUCTION MEDI-VAC 1/4"X20' N620A - HE

## (undated) DEVICE — RETR ELASTIC STAYS LONE STAR BLUNT DUAL LEAD 3550-1G

## (undated) DEVICE — SU SILK 3-0 TIE 12X30" A304H

## (undated) DEVICE — DRAIN JACKSON PRATT RESERVOIR 100ML SU130-1305

## (undated) DEVICE — SU UMBILICAL TAPE .125X30" U11T

## (undated) DEVICE — LINEN TOWEL PACK X6 WHITE 5487

## (undated) RX ORDER — EPHEDRINE SULFATE 50 MG/ML
INJECTION, SOLUTION INTRAMUSCULAR; INTRAVENOUS; SUBCUTANEOUS
Status: DISPENSED
Start: 2024-01-22

## (undated) RX ORDER — FENTANYL CITRATE 50 UG/ML
INJECTION, SOLUTION INTRAMUSCULAR; INTRAVENOUS
Status: DISPENSED
Start: 2024-01-22

## (undated) RX ORDER — SODIUM CHLORIDE, SODIUM LACTATE, POTASSIUM CHLORIDE, CALCIUM CHLORIDE 600; 310; 30; 20 MG/100ML; MG/100ML; MG/100ML; MG/100ML
INJECTION, SOLUTION INTRAVENOUS
Status: DISPENSED
Start: 2024-01-22